# Patient Record
Sex: MALE | Race: BLACK OR AFRICAN AMERICAN | Employment: FULL TIME | URBAN - METROPOLITAN AREA
[De-identification: names, ages, dates, MRNs, and addresses within clinical notes are randomized per-mention and may not be internally consistent; named-entity substitution may affect disease eponyms.]

---

## 2017-01-27 ENCOUNTER — OFFICE VISIT (OUTPATIENT)
Dept: INTERNAL MEDICINE CLINIC | Age: 52
End: 2017-01-27

## 2017-01-27 VITALS
SYSTOLIC BLOOD PRESSURE: 154 MMHG | HEIGHT: 69 IN | HEART RATE: 62 BPM | WEIGHT: 206 LBS | RESPIRATION RATE: 12 BRPM | BODY MASS INDEX: 30.51 KG/M2 | TEMPERATURE: 98.6 F | DIASTOLIC BLOOD PRESSURE: 96 MMHG

## 2017-01-27 DIAGNOSIS — E78.2 MIXED HYPERLIPIDEMIA: ICD-10-CM

## 2017-01-27 DIAGNOSIS — R03.0 BORDERLINE HYPERTENSION: ICD-10-CM

## 2017-01-27 DIAGNOSIS — M75.102 ROTATOR CUFF SYNDROME, LEFT: Primary | ICD-10-CM

## 2017-01-27 DIAGNOSIS — F41.9 ANXIETY: ICD-10-CM

## 2017-01-27 DIAGNOSIS — E55.9 VITAMIN D DEFICIENCY: ICD-10-CM

## 2017-01-27 DIAGNOSIS — E29.1 MALE HYPOGONADISM: ICD-10-CM

## 2017-01-27 RX ORDER — HYDROCHLOROTHIAZIDE 25 MG/1
25 TABLET ORAL DAILY
Qty: 90 TAB | Refills: 1 | Status: SHIPPED | OUTPATIENT
Start: 2017-01-27 | End: 2017-07-28 | Stop reason: SDUPTHER

## 2017-01-27 RX ORDER — EMTRICITABINE AND TENOFOVIR DISOPROXIL FUMARATE 200; 300 MG/1; MG/1
1 TABLET, FILM COATED ORAL DAILY
Refills: 0 | COMMUNITY
Start: 2016-12-28 | End: 2022-06-09 | Stop reason: ALTCHOICE

## 2017-01-27 RX ORDER — CLINDAMYCIN PHOSPHATE 11.9 MG/ML
SOLUTION TOPICAL
Qty: 60 ML | Refills: 3 | Status: SHIPPED | OUTPATIENT
Start: 2017-01-27 | End: 2017-07-28 | Stop reason: SDUPTHER

## 2017-01-27 RX ORDER — PRAVASTATIN SODIUM 20 MG/1
20 TABLET ORAL
Qty: 90 TAB | Refills: 1 | Status: SHIPPED | OUTPATIENT
Start: 2017-01-27 | End: 2017-07-28 | Stop reason: SDUPTHER

## 2017-01-27 RX ORDER — ALPRAZOLAM 0.25 MG/1
0.25 TABLET ORAL
Qty: 90 TAB | Refills: 1 | Status: SHIPPED | OUTPATIENT
Start: 2017-01-27 | End: 2017-07-31 | Stop reason: SDUPTHER

## 2017-01-27 RX ORDER — TESTOSTERONE CYPIONATE 200 MG/ML
200 INJECTION INTRAMUSCULAR
Qty: 10 ML | Refills: 1 | Status: SHIPPED | OUTPATIENT
Start: 2017-01-27 | End: 2017-07-28 | Stop reason: SDUPTHER

## 2017-01-27 RX ORDER — HYDROCODONE BITARTRATE AND ACETAMINOPHEN 5; 325 MG/1; MG/1
1 TABLET ORAL
Qty: 30 TAB | Refills: 0 | Status: SHIPPED | OUTPATIENT
Start: 2017-01-27 | End: 2017-07-28 | Stop reason: ALTCHOICE

## 2017-01-27 RX ORDER — HYDROCHLOROTHIAZIDE 12.5 MG/1
12.5 CAPSULE ORAL DAILY
Qty: 90 CAP | Refills: 1 | Status: CANCELLED | OUTPATIENT
Start: 2017-01-27

## 2017-01-27 NOTE — MR AVS SNAPSHOT
Visit Information Date & Time Provider Department Dept. Phone Encounter #  
 1/27/2017 10:30 AM Asael Mclaughlin MD [facility] [address] [id number] Upcoming Health Maintenance Date Due DTaP/Tdap/Td series (1 - Tdap) 6/15/1985 INFLUENZA AGE 9 TO ADULT 8/1/2016 COLONOSCOPY 11/7/2018 Allergies as of 1/27/2017  Review Complete On: 1/27/2017 By: Asael Mclaughlin MD  
  
 Severity Noted Reaction Type Reactions Sulfa (Sulfonamide Antibiotics)  03/06/2015    Other (comments) Current Immunizations  Never Reviewed No immunizations on file. Not reviewed this visit You Were Diagnosed With   
  
 Codes Comments Rotator cuff syndrome, left    -  Primary ICD-10-CM: M75.102 ICD-9-CM: 726.10 Anxiety     ICD-10-CM: F41.9 ICD-9-CM: 300.00 Mixed hyperlipidemia     ICD-10-CM: E78.2 ICD-9-CM: 272.2 Borderline hypertension     ICD-10-CM: R03.0 ICD-9-CM: 796.2 Male hypogonadism     ICD-10-CM: E29.1 ICD-9-CM: 257.2 Vitamin D deficiency     ICD-10-CM: E55.9 ICD-9-CM: 268.9 Vitals BP Pulse Temp Resp Height(growth percentile) Weight(growth percentile) (!) 154/96 (BP 1 Location: Left arm, BP Patient Position: Sitting) 62 98.6 °F (37 °C) 12 5' 9\" (1.753 m) 206 lb (93.4 kg) BMI Smoking Status 30.42 kg/m2 Former Smoker Vitals History BMI and BSA Data Body Mass Index Body Surface Area  
 30.42 kg/m 2 2.13 m 2 Preferred Pharmacy Pharmacy Name Phone [phone] Your Updated Medication List  
  
   
This list is accurate as of: 1/27/17 11:20 AM.  Always use your most recent med list.  
  
  
  
  
 ALPRAZolam 0.25 mg tablet Commonly known as:  Karthikeyan Martinez Take 1 Tab by mouth daily as needed for Anxiety or Sleep. Indications: ANXIETY  
  
 betamethasone dipropionate 0.05 % ointment Commonly known as:  Shirin Asai Apply  to affected area two (2) times a day. clindamycin 1 % external solution Commonly known as:  CLEOCIN T  
use thin film on affected area  
  
 desoximetasone 0.05 % topical cream  
Commonly known as:  TOPICORT Apply  to affected area two (2) times a day. hydroCHLOROthiazide 25 mg tablet Commonly known as:  HYDRODIURIL Take 1 Tab by mouth daily. HYDROcodone-acetaminophen 5-325 mg per tablet Commonly known as:  Lynnetta Whitney Take 1 Tab by mouth every six (6) hours as needed for Pain. Max Daily Amount: 4 Tabs. pantoprazole 40 mg tablet Commonly known as:  PROTONIX Take 1 Tab by mouth daily. pravastatin 20 mg tablet Commonly known as:  PRAVACHOL Take 1 Tab by mouth nightly. testosterone cypionate 200 mg/mL injection Commonly known as:  DEPOTESTOTERONE CYPIONATE 1 mL by IntraMUSCular route every fourteen (14) days. Max Daily Amount: 200 mg. Please dispense 10 ml bottle TRUVADA 200-300 mg per tablet Generic drug:  emtricitabine-tenofovir (TDF) Take 1 Tab by mouth daily. Prescriptions Printed Refills ALPRAZolam (XANAX) 0.25 mg tablet 1 Sig: Take 1 Tab by mouth daily as needed for Anxiety or Sleep. Indications: ANXIETY Class: Print Route: Oral  
 clindamycin (CLEOCIN T) 1 % external solution 3 Sig: use thin film on affected area Class: Print  
 pravastatin (PRAVACHOL) 20 mg tablet 1 Sig: Take 1 Tab by mouth nightly. Class: Print Route: Oral  
 testosterone cypionate (DEPOTESTOTERONE CYPIONATE) 200 mg/mL injection 1 Si mL by IntraMUSCular route every fourteen (14) days. Max Daily Amount: 200 mg. Please dispense 10 ml bottle Class: Print Route: IntraMUSCular  
 hydroCHLOROthiazide (HYDRODIURIL) 25 mg tablet 1 Sig: Take 1 Tab by mouth daily. Class: Print  Route: Oral  
 HYDROcodone-acetaminophen (NORCO) 5-325 mg per tablet 0  
 Sig: Take 1 Tab by mouth every six (6) hours as needed for Pain. Max Daily Amount: 4 Tabs. Class: Print Route: Oral  
  
We Performed the Following CBC WITH AUTOMATED DIFF [60108 CPT(R)] METABOLIC PANEL, COMPREHENSIVE [38800 CPT(R)] PSA DIAGNOSTIC (PROSTATIC SPECIFIC AG) U115954 CPT(R)] REFERRAL TO ORTHOPEDICS [WPD612 Custom] Comments:  
 Please evaluate patient for left shoulder pain. VITAMIN D, 25 HYDROXY [id number] CPT(R)] To-Do List   
 01/27/2017 Imaging:  MRI SHOULDER LT WO CONT Referral Information Referral ID Referred By Referred To  
  
 [id number] Nalini Figueroa Not Available Visits Status Start Date End Date 1 New Request 1/27/17 1/27/18 If your referral has a status of pending review or denied, additional information will be sent to support the outcome of this decision. Referral ID Referred By Referred To  
 [id number] [address] Phone: [phone] Fax: [phone] Visits Status Start Date End Date 1 New Request 1/27/17 1/27/18 If your referral has a status of pending review or denied, additional information will be sent to support the outcome of this decision. Introducing [facility] & HEALTH SERVICES! Dear Eric Tran: Thank you for requesting a [email or web address] account. Our records indicate that you already have an active [email or web address] account. You can access your account anytime at [email or web address] Did you know that you can access your hospital and ER discharge instructions at any time in [email or web address]? You can also review all of your test results from your hospital stay or ER visit. Additional Information If you have questions, please visit the Frequently Asked Questions section of the [email or web address] website at [email or web address] Remember, [email or web address] is NOT to be used for urgent needs. For medical emergencies, dial 911. Now available from your iPhone and Android! Please provide this summary of care documentation to your next provider. Your primary care clinician is listed as Glenda Jamison. If you have any questions after today's visit, please call [phone].

## 2017-01-27 NOTE — PROGRESS NOTES
HISTORY OF PRESENT ILLNESS    Presents with left shoulder pain for 8 month(s). Worsening since 11/2016. Felt a dull pain. Associated symptoms include: hurts to lift over head. Feels pain of lateral  elbow and arm shakes when trying to lift it. No prior issues w shoulder. Low T. Saw Dr Sepideh Monae 1/19/17 and needs labs done by me - PSA, Test, CMP, CBC    Seeing [facility] for high risk sexual behavior and is taking HIV prophylaxis. Cr was 1. 12. Taking Truvada. Hypertension- taking 12.5 mg HCTZ  Hypertension ROS: taking medications as instructed, no medication side effects noted, no TIA's, no chest pain on exertion, no dyspnea on exertion, no swelling of ankles     reports that he has quit smoking. He quit smokeless tobacco use about 10 years ago. reports that he drinks alcohol. BP Readings from Last 2 Encounters:   01/27/17 (!) 154/96   09/16/16 (!) 161/93     Hyperlipidemia  Currently he takes pravastatin 20 mg  ROS: taking medications as instructed, no medication side effects noted  No new myalgias, no joint pains, no weakness  No TIA's, no chest pain on exertion, no dyspnea on exertion, no swelling of ankles. Lab Results   Component Value Date/Time    Cholesterol, total 164 05/06/2016 10:43 AM    HDL Cholesterol 40 05/06/2016 10:43 AM    LDL, calculated 101 05/06/2016 10:43 AM    VLDL, calculated 23 05/06/2016 10:43 AM    Triglyceride 116 05/06/2016 10:43 AM         Review of Systems   All other systems reviewed and are negative, except as noted in HPI    Past Medical and Surgical History   has a past medical history of Anxiety; Borderline hypertension; Cystic acne; Diverticulitis of colon (7/2014); Eczema; GERD (gastroesophageal reflux disease); Hyperlipidemia; Male hypogonadism (2012); Skin abscess; Tubular adenoma (11/2013); Vitamin B12 deficiency; and Vitamin D deficiency. has a past surgical history that includes cholecystectomy (2005) and colonoscopy (11/7/13).      reports that he has quit smoking. He quit smokeless tobacco use about 10 years ago. He reports that he drinks alcohol.  family history includes Cancer in his mother; Hypertension in his mother and sister. Physical Exam   Nursing note and vitals reviewed. Blood pressure (!) 154/96, pulse 62, temperature 98.6 °F (37 °C), resp. rate 12, height 5' 9\" (1.753 m), weight 206 lb (93.4 kg). Constitutional: In no distress. Eyes: Conjunctivae are normal.  HEENT:  No LAD or thyromegaly   Cardiovascular: Normal rate. regular rhythm. No murmurs  No edema  Pulmonary/Chest: Effort normal. clear to ausculation blaterally  Musculoskeletal:  no edema. Abd:  Neurological: Alert and oriented. Grossly intact cranial nerves and motor function. Skin: No rash noted. Psychiatric: Normal mood and affect. Behavior is normal.     ASSESSMENT and PLAN  Junito Emerson was seen today for shoulder pain. Diagnoses and all orders for this visit:    Rotator cuff syndrome, left - severe pain, failed conservative tx. .   Needs MRI and ortho evaluation. norco prn.    -     MRI SHOULDER LT WO CONT; Future  -     REFERRAL TO ORTHOPEDICS  -     HYDROcodone-acetaminophen (NORCO) 5-325 mg per tablet; Take 1 Tab by mouth every six (6) hours as needed for Pain. Max Daily Amount: 4 Tabs. Anxiety - Controlled on current regimen. Continue current medications as written in chart.  -     ALPRAZolam (XANAX) 0.25 mg tablet; Take 1 Tab by mouth daily as needed for Anxiety or Sleep. Indications: ANXIETY    Mixed hyperlipidemia - Controlled on current regimen. Continue current medications as written in chart. -     pravastatin (PRAVACHOL) 20 mg tablet; Take 1 Tab by mouth nightly. Borderline hypertension - borderline controlled. Increase HCTZ to 25 mg daily    -     CBC WITH AUTOMATED DIFF  -     hydroCHLOROthiazide (HYDRODIURIL) 25 mg tablet; Take 1 Tab by mouth daily. Male hypogonadism - Controlled on current regimen.   Continue current medications as written in chart. -     testosterone cypionate (DEPOTESTOTERONE CYPIONATE) 200 mg/mL injection; 1 mL by IntraMUSCular route every fourteen (14) days. Max Daily Amount: 200 mg. Please dispense 10 ml bottle  -     PROSTATE SPECIFIC AG  -     METABOLIC PANEL, COMPREHENSIVE    Vitamin D deficiency  -     VITAMIN D, 25 HYDROXY      lab results and schedule of future lab studies reviewed with patient  reviewed medications and side effects in detail    Return to clinic for further evaluation if new symptoms develop or if current symptoms worsen or fail to resolve. There are no Patient Instructions on file for this visit.

## 2017-01-28 LAB
25(OH)D3+25(OH)D2 SERPL-MCNC: 16 NG/ML (ref 30–100)
ALBUMIN SERPL-MCNC: 4.8 G/DL (ref 3.5–5.5)
ALBUMIN/GLOB SERPL: 1.8 {RATIO} (ref 1.1–2.5)
ALP SERPL-CCNC: 70 IU/L (ref 39–117)
ALT SERPL-CCNC: 21 IU/L (ref 0–44)
AST SERPL-CCNC: 27 IU/L (ref 0–40)
BASOPHILS # BLD AUTO: 0 X10E3/UL (ref 0–0.2)
BASOPHILS NFR BLD AUTO: 0 %
BILIRUB SERPL-MCNC: 1.3 MG/DL (ref 0–1.2)
BUN SERPL-MCNC: 13 MG/DL (ref 6–24)
BUN/CREAT SERPL: 10 (ref 9–20)
CALCIUM SERPL-MCNC: 9.4 MG/DL (ref 8.7–10.2)
CHLORIDE SERPL-SCNC: 98 MMOL/L (ref 96–106)
CO2 SERPL-SCNC: 27 MMOL/L (ref 18–29)
CREAT SERPL-MCNC: 1.31 MG/DL (ref 0.76–1.27)
EOSINOPHIL # BLD AUTO: 0 X10E3/UL (ref 0–0.4)
EOSINOPHIL NFR BLD AUTO: 0 %
ERYTHROCYTE [DISTWIDTH] IN BLOOD BY AUTOMATED COUNT: 15.7 % (ref 12.3–15.4)
GLOBULIN SER CALC-MCNC: 2.6 G/DL (ref 1.5–4.5)
GLUCOSE SERPL-MCNC: 73 MG/DL (ref 65–99)
HCT VFR BLD AUTO: 49.2 % (ref 37.5–51)
HGB BLD-MCNC: 16.9 G/DL (ref 12.6–17.7)
IMM GRANULOCYTES # BLD: 0 X10E3/UL (ref 0–0.1)
IMM GRANULOCYTES NFR BLD: 0 %
LYMPHOCYTES # BLD AUTO: 1.8 X10E3/UL (ref 0.7–3.1)
LYMPHOCYTES NFR BLD AUTO: 27 %
MCH RBC QN AUTO: 34.1 PG (ref 26.6–33)
MCHC RBC AUTO-ENTMCNC: 34.3 G/DL (ref 31.5–35.7)
MCV RBC AUTO: 99 FL (ref 79–97)
MONOCYTES # BLD AUTO: 0.4 X10E3/UL (ref 0.1–0.9)
MONOCYTES NFR BLD AUTO: 5 %
NEUTROPHILS # BLD AUTO: 4.4 X10E3/UL (ref 1.4–7)
NEUTROPHILS NFR BLD AUTO: 68 %
PLATELET # BLD AUTO: 159 X10E3/UL (ref 150–379)
POTASSIUM SERPL-SCNC: 4.1 MMOL/L (ref 3.5–5.2)
PROT SERPL-MCNC: 7.4 G/DL (ref 6–8.5)
PSA SERPL-MCNC: 1.4 NG/ML (ref 0–4)
RBC # BLD AUTO: 4.96 X10E6/UL (ref 4.14–5.8)
SODIUM SERPL-SCNC: 143 MMOL/L (ref 134–144)
WBC # BLD AUTO: 6.6 X10E3/UL (ref 3.4–10.8)

## 2017-01-31 RX ORDER — ASPIRIN 325 MG
1 TABLET, DELAYED RELEASE (ENTERIC COATED) ORAL
Qty: 8 CAP | Refills: 0 | Status: SHIPPED | OUTPATIENT
Start: 2017-01-31 | End: 2017-03-22

## 2017-03-27 ENCOUNTER — TELEPHONE (OUTPATIENT)
Dept: INTERNAL MEDICINE CLINIC | Age: 52
End: 2017-03-27

## 2017-03-27 RX ORDER — OSELTAMIVIR PHOSPHATE 75 MG/1
75 CAPSULE ORAL 2 TIMES DAILY
Qty: 10 CAP | Refills: 0 | Status: SHIPPED | OUTPATIENT
Start: 2017-03-27 | End: 2017-04-01

## 2017-03-27 NOTE — TELEPHONE ENCOUNTER
Patient would like a call back to discuss some symptoms he is having. He lives 2 hours a way and there are no appts.   [phone]

## 2017-03-27 NOTE — TELEPHONE ENCOUNTER
Spoke with pt: requesting Tamiflu he was exposed to Flu but his temp went to 99.9, some aching , is taking Nyquil and Robitussin , cough has subsided, no appointment's available .

## 2017-03-27 NOTE — TELEPHONE ENCOUNTER
Prescription sent to pharmacy, per request  Return to clinic for further evaluation if new symptoms develop or if current symptoms worsen or fail to resolve.

## 2017-05-01 ENCOUNTER — PATIENT MESSAGE (OUTPATIENT)
Dept: INTERNAL MEDICINE CLINIC | Age: 52
End: 2017-05-01

## 2017-05-01 DIAGNOSIS — K21.00 GASTROESOPHAGEAL REFLUX DISEASE WITH ESOPHAGITIS: ICD-10-CM

## 2017-05-01 RX ORDER — PANTOPRAZOLE SODIUM 40 MG/1
40 TABLET, DELAYED RELEASE ORAL DAILY
Qty: 90 TAB | Refills: 1 | Status: SHIPPED | OUTPATIENT
Start: 2017-05-01 | End: 2017-07-28 | Stop reason: SDUPTHER

## 2017-07-28 ENCOUNTER — OFFICE VISIT (OUTPATIENT)
Dept: INTERNAL MEDICINE CLINIC | Age: 52
End: 2017-07-28

## 2017-07-28 VITALS
HEIGHT: 69 IN | BODY MASS INDEX: 29.18 KG/M2 | TEMPERATURE: 98.5 F | HEART RATE: 69 BPM | RESPIRATION RATE: 12 BRPM | DIASTOLIC BLOOD PRESSURE: 82 MMHG | WEIGHT: 197 LBS | SYSTOLIC BLOOD PRESSURE: 133 MMHG

## 2017-07-28 DIAGNOSIS — L70.0 CYSTIC ACNE: ICD-10-CM

## 2017-07-28 DIAGNOSIS — K21.00 GASTROESOPHAGEAL REFLUX DISEASE WITH ESOPHAGITIS: ICD-10-CM

## 2017-07-28 DIAGNOSIS — N52.9 ERECTILE DYSFUNCTION, UNSPECIFIED ERECTILE DYSFUNCTION TYPE: ICD-10-CM

## 2017-07-28 DIAGNOSIS — E29.1 MALE HYPOGONADISM: ICD-10-CM

## 2017-07-28 DIAGNOSIS — E87.6 HYPOKALEMIA: Primary | ICD-10-CM

## 2017-07-28 DIAGNOSIS — E78.00 PURE HYPERCHOLESTEROLEMIA: ICD-10-CM

## 2017-07-28 DIAGNOSIS — R03.0 BORDERLINE HYPERTENSION: ICD-10-CM

## 2017-07-28 DIAGNOSIS — E78.2 MIXED HYPERLIPIDEMIA: ICD-10-CM

## 2017-07-28 RX ORDER — CEPHALEXIN 500 MG/1
500 CAPSULE ORAL 2 TIMES DAILY
Qty: 20 CAP | Refills: 2 | Status: SHIPPED | OUTPATIENT
Start: 2017-07-28 | End: 2017-07-28 | Stop reason: CLARIF

## 2017-07-28 RX ORDER — SPIRONOLACTONE 50 MG/1
50 TABLET, FILM COATED ORAL DAILY
Qty: 30 TAB | Refills: 2 | Status: SHIPPED | OUTPATIENT
Start: 2017-07-28 | End: 2017-09-10 | Stop reason: SDUPTHER

## 2017-07-28 RX ORDER — SILDENAFIL CITRATE 20 MG/1
TABLET ORAL
Qty: 90 TAB | Refills: 2 | Status: SHIPPED | OUTPATIENT
Start: 2017-07-28 | End: 2018-01-15

## 2017-07-28 RX ORDER — PRAVASTATIN SODIUM 20 MG/1
20 TABLET ORAL
Qty: 90 TAB | Refills: 1 | Status: SHIPPED | OUTPATIENT
Start: 2017-07-28 | End: 2018-05-25 | Stop reason: SDUPTHER

## 2017-07-28 RX ORDER — TESTOSTERONE CYPIONATE 200 MG/ML
200 INJECTION INTRAMUSCULAR
Qty: 10 ML | Refills: 1 | Status: SHIPPED | OUTPATIENT
Start: 2017-07-28 | End: 2018-05-25 | Stop reason: SDUPTHER

## 2017-07-28 RX ORDER — POTASSIUM CHLORIDE 20 MEQ/1
20 TABLET, EXTENDED RELEASE ORAL DAILY
Qty: 30 TAB | Refills: 2 | Status: SHIPPED | OUTPATIENT
Start: 2017-07-28 | End: 2017-09-08 | Stop reason: SDDI

## 2017-07-28 RX ORDER — PANTOPRAZOLE SODIUM 40 MG/1
40 TABLET, DELAYED RELEASE ORAL DAILY
Qty: 90 TAB | Refills: 1 | Status: SHIPPED | OUTPATIENT
Start: 2017-07-28 | End: 2017-12-19 | Stop reason: SDUPTHER

## 2017-07-28 RX ORDER — HYDROCHLOROTHIAZIDE 25 MG/1
25 TABLET ORAL DAILY
Qty: 90 TAB | Refills: 1 | Status: SHIPPED | OUTPATIENT
Start: 2017-07-28 | End: 2018-03-01 | Stop reason: SDUPTHER

## 2017-07-28 RX ORDER — CLINDAMYCIN PHOSPHATE 11.9 MG/ML
SOLUTION TOPICAL
Qty: 60 ML | Refills: 3 | Status: SHIPPED | OUTPATIENT
Start: 2017-07-28 | End: 2018-12-14 | Stop reason: SDUPTHER

## 2017-07-28 NOTE — MR AVS SNAPSHOT
Visit Information Date & Time Provider Department Dept. Phone Encounter #  
 7/28/2017  2:15 PM Eduard Garcia MD [facility] [address] [id number] Upcoming Health Maintenance Date Due DTaP/Tdap/Td series (1 - Tdap) 6/15/1985 INFLUENZA AGE 9 TO ADULT 8/1/2017 COLONOSCOPY 11/7/2018 Allergies as of 7/28/2017  Review Complete On: 7/28/2017 By: Familia Yao Severity Noted Reaction Type Reactions Sulfa (Sulfonamide Antibiotics)  03/06/2015    Other (comments) Current Immunizations  Never Reviewed No immunizations on file. Not reviewed this visit You Were Diagnosed With   
  
 Codes Comments Hypokalemia    -  Primary ICD-10-CM: E87.6 ICD-9-CM: 276.8 Borderline hypertension     ICD-10-CM: R03.0 ICD-9-CM: 796.2 Pure hypercholesterolemia     ICD-10-CM: E78.00 ICD-9-CM: 272.0 Cystic acne     ICD-10-CM: L70.0 ICD-9-CM: 706.1 Vitals BP Pulse Temp Resp Height(growth percentile) Weight(growth percentile) 133/82 (BP 1 Location: Left arm, BP Patient Position: Sitting) 69 98.5 °F (36.9 °C) (Oral) 12 5' 9\" (1.753 m) 197 lb (89.4 kg) BMI Smoking Status 29.09 kg/m2 Former Smoker Vitals History BMI and BSA Data Body Mass Index Body Surface Area  
 29.09 kg/m 2 2.09 m 2 Preferred Pharmacy Pharmacy Name Phone [phone] Your Updated Medication List  
  
   
This list is accurate as of: 7/28/17  2:50 PM.  Always use your most recent med list.  
  
  
  
  
 ALPRAZolam 0.25 mg tablet Commonly known as:  Deepak Rodgers Take 1 Tab by mouth daily as needed for Anxiety or Sleep. Indications: ANXIETY  
  
 betamethasone dipropionate 0.05 % ointment Commonly known as:  Birtha Risk Apply  to affected area two (2) times a day. clindamycin 1 % external solution Commonly known as:  CLEOCIN T  
 use thin film on affected area  
  
 desoximetasone 0.05 % topical cream  
Commonly known as:  TOPICORT Apply  to affected area two (2) times a day. hydroCHLOROthiazide 25 mg tablet Commonly known as:  HYDRODIURIL Take 1 Tab by mouth daily. HYDROcodone-acetaminophen 5-325 mg per tablet Commonly known as:  Liebenthal Jef Take 1 Tab by mouth every six (6) hours as needed for Pain. Max Daily Amount: 4 Tabs. pantoprazole 40 mg tablet Commonly known as:  PROTONIX Take 1 Tab by mouth daily. potassium chloride 20 mEq tablet Commonly known as:  K-DUR, KLOR-CON Take 1 Tab by mouth daily. pravastatin 20 mg tablet Commonly known as:  PRAVACHOL Take 1 Tab by mouth nightly. spironolactone 50 mg tablet Commonly known as:  ALDACTONE Take 1 Tab by mouth daily. DO NOT TAKE WITH POTASSIUM  
  
 testosterone cypionate 200 mg/mL injection Commonly known as:  DEPOTESTOTERONE CYPIONATE 1 mL by IntraMUSCular route every fourteen (14) days. Max Daily Amount: 200 mg. Please dispense 10 ml bottle TRUVADA 200-300 mg per tablet Generic drug:  emtricitabine-tenofovir (TDF) Take 1 Tab by mouth daily. Prescriptions Printed Refills  
 spironolactone (ALDACTONE) 50 mg tablet 2 Sig: Take 1 Tab by mouth daily. DO NOT TAKE WITH POTASSIUM Class: Print Route: Oral  
  
Prescriptions Sent to Pharmacy Refills  
 potassium chloride (K-DUR, KLOR-CON) 20 mEq tablet 2 Sig: Take 1 Tab by mouth daily. Class: Normal  
 Pharmacy: [address] [place], [place] [address] [facility] Ph #: [phone] Route: Oral  
  
We Performed the Following METABOLIC PANEL, BASIC [56426 CPT(R)] Introducing [facility] & HEALTH SERVICES! Dear Sachin Acuna: Thank you for requesting a [employer]stor account. Our records indicate that you already have an active Mangstor account. You can access your account anytime at [email or web address] Did you know that you can access your hospital and ER discharge instructions at any time in Storyvine? You can also review all of your test results from your hospital stay or ER visit. Additional Information If you have questions, please visit the Frequently Asked Questions section of the Storyvine website at [email or web address] Remember, Storyvine is NOT to be used for urgent needs. For medical emergencies, dial 911. Now available from your iPhone and Android! Please provide this summary of care documentation to your next provider. Your primary care clinician is listed as Bert Garcia. If you have any questions after today's visit, please call [phone].

## 2017-07-28 NOTE — PROGRESS NOTES
HISTORY OF PRESENT ILLNESS    Chief Complaint   Patient presents with    Hypertension       Presents for follow-up    Seeing ID at [facility] for PREP. Recent labs 7/2/17  all normal except K 2.8. Was asked to talk about this with PCP. Taking hctz  Wt Readings from Last 3 Encounters:   07/28/17 197 lb (89.4 kg)   01/27/17 206 lb (93.4 kg)   09/16/16 207 lb (93.9 kg)     Left shoulder pain is improved. Right shoulder is hurting some. He does not want to take pills for this. Refuses orthopedic f/u \"boycotting them\". Ongoing acne cystic of scalp, under buttock, axilla. Using clindamycin gel which helps some. He also used antibiotic pills, but this caused constipation    Hypertension  Hypertension ROS: taking medications as instructed, no medication side effects noted, no TIA's, no chest pain on exertion, no dyspnea on exertion, no swelling of ankles     reports that he has quit smoking. He quit smokeless tobacco use about 10 years ago. reports that he drinks alcohol. BP Readings from Last 2 Encounters:   07/28/17 133/82   01/27/17 (!) 154/96       Review of Systems   All other systems reviewed and are negative, except as noted in HPI    Past Medical and Surgical History   has a past medical history of Anxiety; Borderline hypertension; Cystic acne; Diverticulitis of colon (7/2014); Eczema; GERD (gastroesophageal reflux disease); Hyperlipidemia; Male hypogonadism (2012); Skin abscess; Tubular adenoma (11/2013); Vitamin B12 deficiency; and Vitamin D deficiency. has a past surgical history that includes cholecystectomy (2005) and colonoscopy (11/7/13). reports that he has quit smoking. He quit smokeless tobacco use about 10 years ago. He reports that he drinks alcohol.  family history includes Cancer in his mother; Hypertension in his mother and sister. Physical Exam   Nursing note and vitals reviewed. Blood pressure 133/82, pulse 69, temperature 98.5 °F (36.9 °C), temperature source Oral, resp. rate 12, height 5' 9\" (1.753 m), weight 197 lb (89.4 kg). Constitutional:  No distress. Eyes: Conjunctivae are normal.   Ears:  Hearing grossly intact  Cardiovascular: Normal rate. regular rhythm, no murmurs or gallops  No edema  Pulmonary/Chest: Effort normal.   CTAB  Musculoskeletal: moves all 4 extremities   Neurological: Alert and oriented to person, place, and time. Skin: No rash noted. Psychiatric: Normal mood and affect. Behavior is normal.     ASSESSMENT and PLAN  Diagnoses and all orders for this visit:    1. Hypokalemia - due to HCTZ. I recommend trial of EITHER KCL or can add spironolactone for this and for acne. rtc 6 weeks for labs  -     potassium chloride (K-DUR, KLOR-CON) 20 mEq tablet; Take 1 Tab by mouth daily. -     spironolactone (ALDACTONE) 50 mg tablet; Take 1 Tab by mouth daily. DO NOT TAKE WITH POTASSIUM  -     METABOLIC PANEL, BASIC    2. Borderline hypertension - Controlled on current regimen. Continue current medications as written in chart.  -     METABOLIC PANEL, BASIC  -     hydroCHLOROthiazide (HYDRODIURIL) 25 mg tablet; Take 1 Tab by mouth daily. 3. Pure hypercholesterolemia - constipation    4. Cystic acne - scalp. Trial of spironolactone    5. Erectile dysfunction, unspecified erectile dysfunction type  -     sildenafil (REVATIO) 20 mg tablet; Take 3-5 pills once daily as needed    6. Mixed hyperlipidemia - Controlled on current regimen. Continue current medications as written in chart. -     pravastatin (PRAVACHOL) 20 mg tablet; Take 1 Tab by mouth nightly. 7. Male hypogonadism - controlled. Check labs in 6 weeks. -     testosterone cypionate (DEPOTESTOTERONE CYPIONATE) 200 mg/mL injection; 1 mL by IntraMUSCular route every fourteen (14) days. Max Daily Amount: 200 mg. Please dispense 10 ml bottle    8. Gastroesophageal reflux disease with esophagitis  -     pantoprazole (PROTONIX) 40 mg tablet; Take 1 Tab by mouth daily.     Other orders  - clindamycin (CLEOCIN T) 1 % external solution; use thin film on affected area      There are no Patient Instructions on file for this visit.    lab results and schedule of future lab studies reviewed with patient  reviewed medications and side effects in detail    Return to clinic for further evaluation if new symptoms develop    Follow-up Disposition: Not on File    Current Outpatient Prescriptions   Medication Sig    potassium chloride (K-DUR, KLOR-CON) 20 mEq tablet Take 1 Tab by mouth daily.  spironolactone (ALDACTONE) 50 mg tablet Take 1 Tab by mouth daily. DO NOT TAKE WITH POTASSIUM    sildenafil (REVATIO) 20 mg tablet Take 3-5 pills once daily as needed    pravastatin (PRAVACHOL) 20 mg tablet Take 1 Tab by mouth nightly.  testosterone cypionate (DEPOTESTOTERONE CYPIONATE) 200 mg/mL injection 1 mL by IntraMUSCular route every fourteen (14) days. Max Daily Amount: 200 mg. Please dispense 10 ml bottle    hydroCHLOROthiazide (HYDRODIURIL) 25 mg tablet Take 1 Tab by mouth daily.  pantoprazole (PROTONIX) 40 mg tablet Take 1 Tab by mouth daily.  clindamycin (CLEOCIN T) 1 % external solution use thin film on affected area    ALPRAZolam (XANAX) 0.25 mg tablet Take 1 Tab by mouth daily as needed for Anxiety or Sleep. Indications: ANXIETY    TRUVADA 200-300 mg per tablet Take 1 Tab by mouth daily.  desoximetasone (TOPICORT) 0.05 % topical cream Apply  to affected area two (2) times a day.  betamethasone dipropionate (DIPROLENE) 0.05 % ointment Apply  to affected area two (2) times a day. No current facility-administered medications for this visit.

## 2017-07-31 DIAGNOSIS — F41.9 ANXIETY: ICD-10-CM

## 2017-07-31 RX ORDER — ALPRAZOLAM 0.25 MG/1
0.25 TABLET ORAL
Qty: 90 TAB | Refills: 1 | OUTPATIENT
Start: 2017-07-31 | End: 2018-05-25 | Stop reason: SDUPTHER

## 2017-08-08 RX ORDER — DOXYCYCLINE HYCLATE 100 MG
100 TABLET ORAL 2 TIMES DAILY
Qty: 20 TAB | Refills: 0 | Status: SHIPPED | OUTPATIENT
Start: 2017-08-08 | End: 2017-08-18

## 2017-09-08 ENCOUNTER — OFFICE VISIT (OUTPATIENT)
Dept: INTERNAL MEDICINE CLINIC | Age: 52
End: 2017-09-08

## 2017-09-08 VITALS
DIASTOLIC BLOOD PRESSURE: 94 MMHG | HEIGHT: 69 IN | SYSTOLIC BLOOD PRESSURE: 147 MMHG | TEMPERATURE: 98.8 F | BODY MASS INDEX: 28.85 KG/M2 | RESPIRATION RATE: 12 BRPM | WEIGHT: 194.8 LBS | HEART RATE: 89 BPM

## 2017-09-08 DIAGNOSIS — I10 BENIGN ESSENTIAL HTN: ICD-10-CM

## 2017-09-08 DIAGNOSIS — L70.0 CYSTIC ACNE: ICD-10-CM

## 2017-09-08 DIAGNOSIS — L72.3 SEBACEOUS CYST: Primary | ICD-10-CM

## 2017-09-08 DIAGNOSIS — E29.1 MALE HYPOGONADISM: ICD-10-CM

## 2017-09-08 DIAGNOSIS — E55.9 VITAMIN D DEFICIENCY: ICD-10-CM

## 2017-09-09 NOTE — PROGRESS NOTES
HISTORY OF PRESENT ILLNESS  Caio Houser is a 47 y.o. male. HPI   Cystic acne with inflamed sebaceous cysts. Started spironolactone and using cleocin gel. This is chronic. Hypertension - taking hctz and added spironolactone. Not taking KCL. Hypertension ROS: taking medications as instructed, no medication side effects noted, no TIA's, no chest pain on exertion, no dyspnea on exertion, no swelling of ankles     reports that he has quit smoking. He quit smokeless tobacco use about 10 years ago. reports that he drinks alcohol. BP Readings from Last 2 Encounters:   09/08/17 (!) 147/94   07/28/17 133/82       Review of Systems   Constitutional: Negative for diaphoresis, malaise/fatigue and weight loss. Eyes: Negative for blurred vision. Respiratory: Negative for shortness of breath. Cardiovascular: Negative for chest pain. Gastrointestinal: Negative for abdominal pain. Genitourinary: Negative for flank pain and frequency. Musculoskeletal: Negative for myalgias. Skin: Negative for rash. Neurological: Negative for dizziness, weakness and headaches. Psychiatric/Behavioral: The patient is not nervous/anxious. All other systems reviewed and are negative. Physical Exam   Constitutional: He is oriented to person, place, and time. He appears well-developed and well-nourished. No distress. HENT:   Head:       2 firm cysts of right cheek. Also left occipital nodule. Mild acne otherwise   Cardiovascular: Normal rate. Pulmonary/Chest: Effort normal.   Musculoskeletal: He exhibits no edema. Neurological: He is alert and oriented to person, place, and time. Psychiatric: He has a normal mood and affect. His behavior is normal. Judgment and thought content normal.   Nursing note and vitals reviewed. ASSESSMENT and PLAN  Diagnoses and all orders for this visit:    1. Sebaceous cysts - small incisions made and sebum expressed from both facial lesions. 2. Cystic acne - also. It is unclear if spironolactone will help this, but I recommend continuing it and will increase dose if K is acceptable. Using Testosterone which may affect it. 3. Male hypogonadism  -     PROSTATE SPECIFIC AG  -     TESTOSTERONE, FREE & TOTAL  -     CBC W/O DIFF    4. Vitamin D deficiency  -     VITAMIN D, 25 HYDROXY    5. Benign essential HTN - uncontrolled. Will try to push up spironolactone.     -     METABOLIC PANEL, BASIC

## 2017-09-10 DIAGNOSIS — E87.6 HYPOKALEMIA: ICD-10-CM

## 2017-09-10 LAB
25(OH)D3+25(OH)D2 SERPL-MCNC: 25.3 NG/ML (ref 30–100)
BUN SERPL-MCNC: 12 MG/DL (ref 6–24)
BUN/CREAT SERPL: 10 (ref 9–20)
CALCIUM SERPL-MCNC: 9.7 MG/DL (ref 8.7–10.2)
CHLORIDE SERPL-SCNC: 96 MMOL/L (ref 96–106)
CO2 SERPL-SCNC: 29 MMOL/L (ref 18–29)
CREAT SERPL-MCNC: 1.21 MG/DL (ref 0.76–1.27)
ERYTHROCYTE [DISTWIDTH] IN BLOOD BY AUTOMATED COUNT: 15.9 % (ref 12.3–15.4)
GLUCOSE SERPL-MCNC: 74 MG/DL (ref 65–99)
HCT VFR BLD AUTO: 47.8 % (ref 37.5–51)
HGB BLD-MCNC: 16.5 G/DL (ref 12.6–17.7)
MCH RBC QN AUTO: 33.6 PG (ref 26.6–33)
MCHC RBC AUTO-ENTMCNC: 34.5 G/DL (ref 31.5–35.7)
MCV RBC AUTO: 97 FL (ref 79–97)
PLATELET # BLD AUTO: 209 X10E3/UL (ref 150–379)
POTASSIUM SERPL-SCNC: 3.7 MMOL/L (ref 3.5–5.2)
PSA SERPL-MCNC: 1.1 NG/ML (ref 0–4)
RBC # BLD AUTO: 4.91 X10E6/UL (ref 4.14–5.8)
SODIUM SERPL-SCNC: 142 MMOL/L (ref 134–144)
TESTOST FREE SERPL-MCNC: 12.1 PG/ML (ref 7.2–24)
TESTOST SERPL-MCNC: 440 NG/DL (ref 264–916)
WBC # BLD AUTO: 8.5 X10E3/UL (ref 3.4–10.8)

## 2017-09-10 RX ORDER — SPIRONOLACTONE 100 MG/1
100 TABLET, FILM COATED ORAL DAILY
Qty: 30 TAB | Refills: 2 | Status: SHIPPED | OUTPATIENT
Start: 2017-09-10 | End: 2017-12-19 | Stop reason: SDUPTHER

## 2017-12-15 ENCOUNTER — OFFICE VISIT (OUTPATIENT)
Dept: INTERNAL MEDICINE CLINIC | Age: 52
End: 2017-12-15

## 2017-12-15 VITALS
HEART RATE: 85 BPM | RESPIRATION RATE: 12 BRPM | SYSTOLIC BLOOD PRESSURE: 131 MMHG | DIASTOLIC BLOOD PRESSURE: 84 MMHG | TEMPERATURE: 98.5 F | BODY MASS INDEX: 28.14 KG/M2 | HEIGHT: 69 IN | WEIGHT: 190 LBS

## 2017-12-15 DIAGNOSIS — R03.0 BORDERLINE HYPERTENSION: ICD-10-CM

## 2017-12-15 DIAGNOSIS — N60.02 CYST OF LEFT BREAST: Primary | ICD-10-CM

## 2017-12-15 DIAGNOSIS — E78.00 PURE HYPERCHOLESTEROLEMIA: ICD-10-CM

## 2017-12-15 NOTE — PROGRESS NOTES
HISTORY OF PRESENT ILLNESS    Chief Complaint   Patient presents with    Nipple Problem       Presents for follow-up    Hypertension- BP has been controlled  Hypertension ROS: taking medications as instructed, no medication side effects noted, no TIA's, no chest pain on exertion, no dyspnea on exertion, no swelling of ankles     reports that he has quit smoking. He quit smokeless tobacco use about 10 years ago. reports that he drinks alcohol. BP Readings from Last 2 Encounters:   12/15/17 131/84   09/08/17 (!) 147/94     Cysts on scalp, face are improved since starting spironolactone. However, he has a cyst on his left areoaa region which is occasionally draining some thick sebum   He removed his nipple ring. It is moderately tender. Review of Systems   All other systems reviewed and are negative, except as noted in HPI    Past Medical and Surgical History   has a past medical history of Anxiety; Borderline hypertension; Cystic acne; Diverticulitis of colon (7/2014); Eczema; GERD (gastroesophageal reflux disease); Hyperlipidemia; Male hypogonadism (2012); Skin abscess; Tubular adenoma (11/2013); Vitamin B12 deficiency; and Vitamin D deficiency. has a past surgical history that includes cholecystectomy (2005) and colonoscopy (11/7/13). reports that he has quit smoking. He quit smokeless tobacco use about 10 years ago. He reports that he drinks alcohol.  family history includes Cancer in his mother; Hypertension in his mother and sister. Physical Exam   Nursing note and vitals reviewed. Blood pressure 131/84, pulse 85, temperature 98.5 °F (36.9 °C), resp. rate 12, height 5' 9\" (1.753 m), weight 190 lb (86.2 kg). Constitutional:  No distress. Eyes: Conjunctivae are normal.   Ears:  Hearing grossly intact  Cardiovascular: Normal rate. regular rhythm, no murmurs or gallops  No edema  Chest wall - left medial breast/areola with ~2 cm nodule.    No fluctuance  Pulmonary/Chest: Effort normal. CTAB  Musculoskeletal: moves all 4 extremities   Neurological: Alert and oriented to person, place, and time. Skin: No rash noted. Psychiatric: Normal mood and affect. Behavior is normal.     ASSESSMENT and PLAN  Diagnoses and all orders for this visit:    1. Cyst of left breast - I believe this is a sebacous cyst and not gynecomastia, but he it is new since taking spironolactone. Per his request I did make a 2mm incision with a scapel and I was able to express some sebum. Will refer to surgery for excision. Is is not infected  -     [facility]    2. Borderline hypertension - improved on spironolactone. Check K  -     METABOLIC PANEL, COMPREHENSIVE    3. Pure hypercholesterolemia - The patient is asked to make an attempt to improve diet and exercise patterns to aid in medical management of this problem. -     LIPID PANEL    4. Anxiety  Controlled on current regimen. Continue current medications as written in chart.  profile was accessed online for [facility] and reviewed by me during this encounter. I did not see evidence of inappropriate or suspicious controlled substance prescription activity. lab results and schedule of future lab studies reviewed with patient  reviewed medications and side effects in detail  Return to clinic for further evaluation if new symptoms develop      Current Outpatient Prescriptions   Medication Sig    spironolactone (ALDACTONE) 100 mg tablet Take 1 Tab by mouth daily. DO NOT TAKE WITH POTASSIUM    ALPRAZolam (XANAX) 0.25 mg tablet Take 1 Tab by mouth daily as needed for Anxiety or Sleep. Indications: anxiety    sildenafil (REVATIO) 20 mg tablet Take 3-5 pills once daily as needed    pravastatin (PRAVACHOL) 20 mg tablet Take 1 Tab by mouth nightly.  testosterone cypionate (DEPOTESTOTERONE CYPIONATE) 200 mg/mL injection 1 mL by IntraMUSCular route every fourteen (14) days. Max Daily Amount: 200 mg.  Please dispense 10 ml bottle    hydroCHLOROthiazide (HYDRODIURIL) 25 mg tablet Take 1 Tab by mouth daily.  pantoprazole (PROTONIX) 40 mg tablet Take 1 Tab by mouth daily.  clindamycin (CLEOCIN T) 1 % external solution use thin film on affected area    TRUVADA 200-300 mg per tablet Take 1 Tab by mouth daily.  desoximetasone (TOPICORT) 0.05 % topical cream Apply  to affected area two (2) times a day.  betamethasone dipropionate (DIPROLENE) 0.05 % ointment Apply  to affected area two (2) times a day. No current facility-administered medications for this visit.

## 2017-12-15 NOTE — LETTER
NOTIFICATION RETURN TO WORK 
 
12/15/2017 4:41 PM 
 
Mr. Simeon WAGNER 50. 
[facility] [address] To Whom It May Concern: 
 
Simeon Castano is currently under the care of [address] [place] [address],[address]. He will return to work/school on: 12/18/17 If there are questions or concerns please have the patient contact our office.  
 
 
 
Sincerely, 
 
 
Uziel Esquivel MD

## 2017-12-15 NOTE — MR AVS SNAPSHOT
Visit Information Date & Time Provider Department Dept. Phone Encounter #  
 12/15/2017  4:00 PM Long Jonas MD [facility] [address] [id number] Upcoming Health Maintenance Date Due DTaP/Tdap/Td series (1 - Tdap) 6/15/1985 Influenza Age 5 to Adult 8/1/2017 COLONOSCOPY 11/7/2018 Allergies as of 12/15/2017  Review Complete On: 12/15/2017 By: Long Jonas MD  
  
 Severity Noted Reaction Type Reactions Sulfa (Sulfonamide Antibiotics)  03/06/2015    Other (comments) Current Immunizations  Never Reviewed No immunizations on file. Not reviewed this visit You Were Diagnosed With   
  
 Codes Comments Cyst of left breast    -  Primary ICD-10-CM: N60.02 
ICD-9-CM: 610.0 Borderline hypertension     ICD-10-CM: R03.0 ICD-9-CM: 796.2 Pure hypercholesterolemia     ICD-10-CM: E78.00 ICD-9-CM: 272.0 Vitals BP Pulse Temp Resp Height(growth percentile) Weight(growth percentile) 131/84 (BP 1 Location: Left arm, BP Patient Position: Sitting) 85 98.5 °F (36.9 °C) 12 5' 9\" (1.753 m) 190 lb (86.2 kg) BMI Smoking Status 28.06 kg/m2 Former Smoker Vitals History BMI and BSA Data Body Mass Index Body Surface Area 28.06 kg/m 2 2.05 m 2 Preferred Pharmacy Pharmacy Name Phone [facility] [phone] Your Updated Medication List  
  
   
This list is accurate as of: 12/15/17  4:39 PM.  Always use your most recent med list.  
  
  
  
  
 ALPRAZolam 0.25 mg tablet Commonly known as:  Han [place] Take 1 Tab by mouth daily as needed for Anxiety or Sleep. Indications: anxiety  
  
 betamethasone dipropionate 0.05 % ointment Commonly known as:  Rowdy Rondon Apply  to affected area two (2) times a day. clindamycin 1 % external solution Commonly known as:  CLEOCIN T  
use thin film on affected area desoximetasone 0.05 % topical cream  
Commonly known as:  TOPICORT Apply  to affected area two (2) times a day. hydroCHLOROthiazide 25 mg tablet Commonly known as:  HYDRODIURIL Take 1 Tab by mouth daily. pantoprazole 40 mg tablet Commonly known as:  PROTONIX Take 1 Tab by mouth daily. pravastatin 20 mg tablet Commonly known as:  PRAVACHOL Take 1 Tab by mouth nightly. sildenafil (antihypertensive) 20 mg tablet Commonly known as:  REVATIO Take 3-5 pills once daily as needed  
  
 spironolactone 100 mg tablet Commonly known as:  ALDACTONE Take 1 Tab by mouth daily. DO NOT TAKE WITH POTASSIUM  
  
 testosterone cypionate 200 mg/mL injection Commonly known as:  DEPOTESTOTERONE CYPIONATE 1 mL by IntraMUSCular route every fourteen (14) days. Max Daily Amount: 200 mg. Please dispense 10 ml bottle TRUVADA 200-300 mg per tablet Generic drug:  emtricitabine-tenofovir (TDF) Take 1 Tab by mouth daily. We Performed the Following LIPID PANEL [45120 CPT(R)] METABOLIC PANEL, COMPREHENSIVE [03506 CPT(R)] REFERRAL TO BREAST SURGERY [REF10 Custom] Referral Information Referral ID Referred By Referred To  
  
 [id number] Vicki COOPER MD   
   [address] [place] [address] Phone: [phone] Fax: [phone] Visits Status Start Date End Date 1 New Request 12/15/17 12/15/18 If your referral has a status of pending review or denied, additional information will be sent to support the outcome of this decision. Introducing [facility] & HEALTH SERVICES! Dear Colletta Chuck: Thank you for requesting a inContact account. Our records indicate that you already have an active inContact account. You can access your account anytime at [email or web address] Did you know that you can access your hospital and ER discharge instructions at any time in [employer]? You can also review all of your test results from your hospital stay or ER visit. Additional Information If you have questions, please visit the Frequently Asked Questions section of the [employer] website at [email or web address] Remember, [employer] is NOT to be used for urgent needs. For medical emergencies, dial 911. Now available from your iPhone and Android! Please provide this summary of care documentation to your next provider. Your primary care clinician is listed as Alessia Reyes. If you have any questions after today's visit, please call [phone].

## 2017-12-19 DIAGNOSIS — K21.00 GASTROESOPHAGEAL REFLUX DISEASE WITH ESOPHAGITIS: ICD-10-CM

## 2017-12-19 DIAGNOSIS — E87.6 HYPOKALEMIA: ICD-10-CM

## 2017-12-19 RX ORDER — SPIRONOLACTONE 100 MG/1
100 TABLET, FILM COATED ORAL DAILY
Qty: 90 TAB | Refills: 1 | Status: SHIPPED | OUTPATIENT
Start: 2017-12-19 | End: 2018-05-25 | Stop reason: SDUPTHER

## 2017-12-19 RX ORDER — PANTOPRAZOLE SODIUM 40 MG/1
40 TABLET, DELAYED RELEASE ORAL DAILY
Qty: 90 TAB | Refills: 1 | Status: SHIPPED | OUTPATIENT
Start: 2017-12-19 | End: 2018-05-25 | Stop reason: SDUPTHER

## 2017-12-21 ENCOUNTER — DOCUMENTATION ONLY (OUTPATIENT)
Dept: SURGERY | Age: 52
End: 2017-12-21

## 2017-12-22 ENCOUNTER — OFFICE VISIT (OUTPATIENT)
Dept: SURGERY | Age: 52
End: 2017-12-22

## 2017-12-22 VITALS
SYSTOLIC BLOOD PRESSURE: 138 MMHG | HEIGHT: 69 IN | DIASTOLIC BLOOD PRESSURE: 88 MMHG | BODY MASS INDEX: 28.58 KG/M2 | HEART RATE: 67 BPM | WEIGHT: 193 LBS

## 2017-12-22 DIAGNOSIS — L72.3 SEBACEOUS CYST: Primary | ICD-10-CM

## 2017-12-22 NOTE — PROGRESS NOTES
HISTORY OF PRESENT ILLNESS  Rosmery Vides is a 47 y.o. male. HPI NEW patient referral for consultation by Dr. Pauline Bustillos for a LEFT sebaceous cyst. The patient reports LEFT nipple swelling , redness and discomfort in the LEFT breast. He saw his PCP last Friday who did an I&D and was able to expel copious amounts of a purulent drainage. The patient has had issues with sebaceous cysts for many years and gets them mainly in his scalp. The patient states he takes spironolactone for the problem that helps with the cysts in his head but has cysts appear in other parts of his body. Right now he has several on his earlobe. The patient does not want more antibiotics and is not sure what can be done at this point. He did have a nipple piercing on that nipple and removed it several years ago. No family history of breast or ovarian cancer  No imaging    Review of Systems   Constitutional: Negative. HENT: Negative. Eyes: Negative. Respiratory: Negative. Cardiovascular: Negative. Gastrointestinal: Negative. Genitourinary: Negative. Musculoskeletal: Negative. Skin: Negative. Neurological: Negative. Endo/Heme/Allergies: Negative. Psychiatric/Behavioral: Negative.         Physical Exam    ASSESSMENT and PLAN  {ASSESSMENT/PLAN:09093}

## 2017-12-22 NOTE — MR AVS SNAPSHOT
Visit Information Date & Time Provider Department Dept. Phone Encounter #  
 12/22/2017 47:94 AM Cami Love MD [address] at [address] [place] [address] [id number] Your Appointments 12/27/2017 11:40 AM  
Follow Up with MD [address] at [facility]) Appt Note: 1 week follow up [address]  
  
   
 [address] Upcoming Health Maintenance Date Due DTaP/Tdap/Td series (1 - Tdap) 6/15/1985 Influenza Age 5 to Adult 8/1/2017 COLONOSCOPY 11/7/2018 Allergies as of 12/22/2017  Review Complete On: 12/22/2017 By: Kristina Ng RN Severity Noted Reaction Type Reactions Sulfa (Sulfonamide Antibiotics)  03/06/2015    Other (comments) Current Immunizations  Never Reviewed No immunizations on file. Not reviewed this visit You Were Diagnosed With   
  
 Codes Comments Sebaceous cyst    -  Primary ICD-10-CM: L72.3 ICD-9-CM: 706. 2 Vitals BP Pulse Height(growth percentile) Weight(growth percentile) BMI Smoking Status 138/88 67 5' 9\" (1.753 m) 193 lb (87.5 kg) 28.5 kg/m2 Former Smoker Vitals History BMI and BSA Data Body Mass Index Body Surface Area 28.5 kg/m 2 2.06 m 2 Preferred Pharmacy Pharmacy Name Phone [facility] [phone] Your Updated Medication List  
  
   
This list is accurate as of: 12/22/17 11:40 AM.  Always use your most recent med list.  
  
  
  
  
 ALPRAZolam 0.25 mg tablet Commonly known as:  Kevin Ketty Take 1 Tab by mouth daily as needed for Anxiety or Sleep. Indications: anxiety  
  
 betamethasone dipropionate 0.05 % ointment Commonly known as:  Magdaline Rip Apply  to affected area two (2) times a day. clindamycin 1 % external solution Commonly known as:  CLEOCIN T  
use thin film on affected area  
  
 desoximetasone 0.05 % topical cream  
Commonly known as:  TOPICORT Apply  to affected area two (2) times a day. hydroCHLOROthiazide 25 mg tablet Commonly known as:  HYDRODIURIL Take 1 Tab by mouth daily. pantoprazole 40 mg tablet Commonly known as:  PROTONIX Take 1 Tab by mouth daily. pravastatin 20 mg tablet Commonly known as:  PRAVACHOL Take 1 Tab by mouth nightly. sildenafil (antihypertensive) 20 mg tablet Commonly known as:  REVATIO Take 3-5 pills once daily as needed  
  
 spironolactone 100 mg tablet Commonly known as:  ALDACTONE Take 1 Tab by mouth daily. DO NOT TAKE WITH POTASSIUM  
  
 testosterone cypionate 200 mg/mL injection Commonly known as:  DEPOTESTOTERONE CYPIONATE 1 mL by IntraMUSCular route every fourteen (14) days. Max Daily Amount: 200 mg. Please dispense 10 ml bottle TRUVADA 200-300 mg per tablet Generic drug:  emtricitabine-tenofovir (TDF) Take 1 Tab by mouth daily. Patient Instructions Learning About Physical Activity What is physical activity? Physical activity is any kind of activity that gets your body moving. The types of physical activity that can help you get fit and stay healthy include: · Aerobic or \"cardio\" activities that make your heart beat faster and make you breathe harder, such as brisk walking, riding a bike, or running. Aerobic activities strengthen your heart and lungs and build up your endurance. · Strength training activities that make your muscles work against, or \"resist,\" something, such as lifting weights or doing push-ups. These activities help tone and strengthen your muscles. · Stretches that allow you to move your joints and muscles through their full range of motion. Stretching helps you be more flexible and avoid injury. What are the benefits of physical activity? Being active is one of the best things you can do to get fit and stay healthy. It helps you to: · Feel stronger and have more energy to do all the things you like to do. · Focus better at school or work and perform better in sports. · Feel, think, and sleep better. · Reach and stay at a healthy weight. · Lose fat and build lean muscle. · Lower your risk for serious health problems. · Keep your bones, muscles, and joints strong. Being fit lets you do more physical activity. And it lets you work out harder without as much effort. How can you make physical activity part of your life? Get at least 30 minutes of exercise on most days of the week. Walking is a good choice. You also may want to do other activities, such as running, swimming, cycling, or playing tennis or team sports. Pick activities that you like-ones that make your heart beat faster, your muscles stronger, and your muscles and joints more flexible. If you find more than one thing you like doing, do them all. You don't have to do the same thing every day. Get your heart pumping every day. Any activity that makes your heart beat faster and keeps it at that rate for a while counts. Here are some great ways to get your heart beating faster: · Go for a brisk walk, run, or bike ride. · Go for a hike or swim. · Go in-line skating. · Play a game of touch football, basketball, or soccer. · Ride a bike. · Play tennis or racquetball. · Climb stairs. Even some household chores can be aerobic-just do them at a faster pace. Vacuuming, raking or mowing the lawn, sweeping the garage, and washing and waxing the car all can help get your heart rate up. Strengthen your muscles during the week. You don't have to lift heavy weights or grow big, bulky muscles to get stronger. Doing a few simple activities that make your muscles work against, or \"resist,\" something can help you get stronger. For example, you can: · Do push-ups or sit-ups, which use your own body weight as resistance. · Lift weights or dumbbells or use stretch bands at home or in a gym or community center. Stretch your muscles often. Stretching will help you as you become more active. It can help you stay flexible, loosen tight muscles, and avoid injury. It can also help improve your balance and posture and can be a great way to relax. Be sure to stretch the muscles you'll be using when you work out. It's best to warm your muscles slightly before you stretch them. Walk or do some other light aerobic activity for a few minutes, and then start stretching. When you stretch your muscles: · Do it slowly. Stretching is not about going fast or making sudden movements. · Don't push or bounce during a stretch. · Hold each stretch for at least 15 to 30 seconds, if you can. You should feel a stretch in the muscle, but not pain. · Breathe out as you do the stretch. Then breathe in as you hold the stretch. Don't hold your breath. If you're worried about how more activity might affect your health, have a checkup before you start. Follow any special advice your doctor gives you for getting a smart start. Where can you learn more? Go to http://cleve-beverly.info/. Enter C305 in the search box to learn more about \"Learning About Physical Activity. \" Current as of: March 13, 2017 Content Version: 11.4 © 3075-2014 [employer]. Care instructions adapted under license by [employer] (which disclaims liability or warranty for this information). If you have questions about a medical condition or this instruction, always ask your healthcare professional. [address] any warranty or liability for your use of this information. Introducing [facility] & HEALTH SERVICES! Dear Yair Napoles: Thank you for requesting a CIQUAL account.   Our records indicate that you already have an active Exalead account. You can access your account anytime at [email or web address] Did you know that you can access your hospital and ER discharge instructions at any time in Exalead? You can also review all of your test results from your hospital stay or ER visit. Additional Information If you have questions, please visit the Frequently Asked Questions section of the Ex[employer]ad website at [email or web address] Remember, Exalead is NOT to be used for urgent needs. For medical emergencies, dial 911. Now available from your iPhone and Android! Please provide this summary of care documentation to your next provider. Your primary care clinician is listed as Renee Gregory. If you have any questions after today's visit, please call [phone].

## 2017-12-22 NOTE — PATIENT INSTRUCTIONS

## 2017-12-22 NOTE — PROGRESS NOTES
HISTORY OF PRESENT ILLNESS  Lawrence Baxter is a 47 y.o. male. HPI  NEW patient referral for consultation by Dr. Chun Ogden for a LEFT sebaceous cyst. The patient reports LEFT nipple swelling , redness and discomfort in the LEFT breast. He saw his PCP last Friday who did an I&D and was able to expel copious amounts of a purulent drainage. The patient has had issues with sebaceous cysts for many years and gets them mainly in his scalp. The patient states he takes spironolactone for the problem that helps with the cysts in his head but has cysts appear in other parts of his body. Right now he has several on his earlobe. The patient does not want more antibiotics and is not sure what can be done at this point. He did have a nipple piercing on that nipple and removed it several years ago. No family history of breast or ovarian cancer    No imaging    Past Medical History:   Diagnosis Date    Anxiety     Borderline hypertension     Cystic acne     Diverticulitis of colon 7/2014    Eczema     Dr. Cara Naranjo GERD (gastroesophageal reflux disease)     Hyperlipidemia     10/2014 chol 198, ldl 140, hdl 43    Male hypogonadism 2012    Dr. Lourdes Amado. psa 0.8 6/2014, psa 1.0    Skin abscess     MSSA 7/2014    Tubular adenoma 11/2013    Vitamin B12 deficiency     Vitamin D deficiency        Past Surgical History:   Procedure Laterality Date    HX CHOLECYSTECTOMY  2005    HX COLONOSCOPY  11/7/13    Dr. Vani Romero.  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 DO NOT TAKE WITH POTASSIUM 90 Tab 1    pantoprazole (PROTONIX) 40 mg tablet Take 1 Tab by mouth daily. 90 Tab 1    ALPRAZolam (XANAX) 0.25 mg tablet Take 1 Tab by mouth daily as needed for Anxiety or Sleep. Indications: anxiety 90 Tab 1    sildenafil (REVATIO) 20 mg tablet Take 3-5 pills once daily as needed 90 Tab 2    pravastatin (PRAVACHOL) 20 mg tablet Take 1 Tab by mouth nightly. 90 Tab 1    testosterone cypionate (DEPOTESTOTERONE CYPIONATE) 200 mg/mL injection 1 mL by IntraMUSCular route every fourteen (14) days. Max Daily Amount: 200 mg. Please dispense 10 ml bottle 10 mL 1    hydroCHLOROthiazide (HYDRODIURIL) 25 mg tablet Take 1 Tab by mouth daily. 90 Tab 1    clindamycin (CLEOCIN T) 1 % external solution use thin film on affected area 60 mL 3    TRUVADA 200-300 mg per tablet Take 1 Tab by mouth daily. 0    desoximetasone (TOPICORT) 0.05 % topical cream Apply  to affected area two (2) times a day. 30 g 3    betamethasone dipropionate (DIPROLENE) 0.05 % ointment Apply  to affected area two (2) times a day. 60 g 1     No current facility-administered medications on file prior to visit. Allergies   Allergen Reactions    Sulfa (Sulfonamide Antibiotics) Other (comments)           ROS  Constitutional: Negative    HENT: Negative. Eyes: Negative. Respiratory: Negative. Cardiovascular: Negative. Gastrointestinal: Negative. Genitourinary: Negative. Musculoskeletal: Negative. Skin: Negative. Neurological: Negative. Endo/Heme/Allergies: Negative. Psychiatric/Behavioral: Negative. Physical Exam   Cardiovascular: Normal rate and normal heart sounds.     Pulmonary/Chest: Right breast exhibits no inverted nipple, no mass, no nipple discharge, no skin change and no tenderness. Left breast exhibits mass and skin change. Left breast exhibits no inverted nipple, no nipple discharge and no tenderness. Breasts are symmetrical.       Lymphadenopathy:     He has no cervical adenopathy. Right cervical: No superficial cervical, no deep cervical and no posterior cervical adenopathy present. Left cervical: No superficial cervical, no deep cervical and no posterior cervical adenopathy present. He has no axillary adenopathy. Right axillary: No pectoral and no lateral adenopathy present. Left axillary: No pectoral and no lateral adenopathy present. BREAST ULTRASOUND  Indication: Superficial LEFT nipple nodule  Technique: The area was scanned using a high-frequency linear-array near-field transducer  Findings: Small, hypoechoic area located close to the skin surface, through transmission  Impression: Residual sebaceous cyst   Disposition: Antibiotic rx and f/u in 1 year    ASSESSMENT and PLAN    ICD-10-CM ICD-9-CM    1. Sebaceous cyst L72.3 706.2      Pt s/p I&D of sebaceous cyst on LEFT nipple and presents with cyst recurrence. Discussed options including I&D, excision and observation. Pt elected for I&D but wants to schedule for a later date. Will schedule I&D of LEFT nipple sebaceous cyst recurrence for next Wednesday. This plan was reviewed with the patient and patient agrees. All questions were answered.     Written by Aman Antony, as dictated by Dr. Riley Goss MD.

## 2017-12-27 ENCOUNTER — OFFICE VISIT (OUTPATIENT)
Dept: SURGERY | Age: 52
End: 2017-12-27

## 2017-12-27 VITALS
WEIGHT: 193 LBS | HEIGHT: 69 IN | SYSTOLIC BLOOD PRESSURE: 126 MMHG | DIASTOLIC BLOOD PRESSURE: 83 MMHG | BODY MASS INDEX: 28.58 KG/M2 | HEART RATE: 79 BPM

## 2017-12-27 DIAGNOSIS — L72.3 SEBACEOUS CYST: Primary | ICD-10-CM

## 2017-12-27 NOTE — MR AVS SNAPSHOT
Visit Information Date & Time Provider Department Dept. Phone Encounter #  
 12/27/2017 00:01 AM China Pichardo MD [address] at [address] [place] [address] [id number] Your Appointments 1/12/2018 11:20 AM  
Follow Up with MD [address] at [facility]) Appt Note: 2 week follow up/ cp$ [id number] LS  
 [address]  
  
   
 [address] Upcoming Health Maintenance Date Due DTaP/Tdap/Td series (1 - Tdap) 6/15/1985 Influenza Age 5 to Adult 8/1/2017 COLONOSCOPY 11/7/2018 Allergies as of 12/27/2017  Review Complete On: 12/27/2017 By: Jocelin Doshi RN Severity Noted Reaction Type Reactions Sulfa (Sulfonamide Antibiotics)  03/06/2015    Other (comments) Current Immunizations  Never Reviewed No immunizations on file. Not reviewed this visit You Were Diagnosed With   
  
 Codes Comments Sebaceous cyst    -  Primary ICD-10-CM: L72.3 ICD-9-CM: 706. 2 Vitals BP Pulse Height(growth percentile) Weight(growth percentile) BMI Smoking Status 126/83 79 5' 9\" (1.753 m) 193 lb (87.5 kg) 28.5 kg/m2 Former Smoker BMI and BSA Data Body Mass Index Body Surface Area 28.5 kg/m 2 2.06 m 2 Preferred Pharmacy Pharmacy Name Phone [facility] [address] [phone] Your Updated Medication List  
  
   
This list is accurate as of: 12/27/17  4:20 PM.  Always use your most recent med list.  
  
  
  
  
 ALPRAZolam 0.25 mg tablet Commonly known as:  Mosetta Harp Take 1 Tab by mouth daily as needed for Anxiety or Sleep. Indications: anxiety  
  
 betamethasone dipropionate 0.05 % ointment Commonly known as:  Rissa Draft Apply  to affected area two (2) times a day. clindamycin 1 % external solution Commonly known as:  CLEOCIN T  
use thin film on affected area  
  
 desoximetasone 0.05 % topical cream  
Commonly known as:  TOPICORT Apply  to affected area two (2) times a day. hydroCHLOROthiazide 25 mg tablet Commonly known as:  HYDRODIURIL Take 1 Tab by mouth daily. pantoprazole 40 mg tablet Commonly known as:  PROTONIX Take 1 Tab by mouth daily. pravastatin 20 mg tablet Commonly known as:  PRAVACHOL Take 1 Tab by mouth nightly. sildenafil (antihypertensive) 20 mg tablet Commonly known as:  REVATIO Take 3-5 pills once daily as needed  
  
 spironolactone 100 mg tablet Commonly known as:  ALDACTONE Take 1 Tab by mouth daily. DO NOT TAKE WITH POTASSIUM  
  
 testosterone cypionate 200 mg/mL injection Commonly known as:  DEPOTESTOTERONE CYPIONATE 1 mL by IntraMUSCular route every fourteen (14) days. Max Daily Amount: 200 mg. Please dispense 10 ml bottle TRUVADA 200-300 mg per tablet Generic drug:  emtricitabine-tenofovir (TDF) Take 1 Tab by mouth daily. Patient Instructions Skin Abscess: Care Instructions Your Care Instructions A skin abscess is a bacterial infection that forms a pocket of pus. A boil is a kind of skin abscess. The doctor may have cut an opening in the abscess so that the pus can drain out. You may have gauze in the cut so that the abscess will stay open and keep draining. You may need antibiotics. You will need to follow up with your doctor to make sure the infection has gone away. The doctor has checked you carefully, but problems can develop later. If you notice any problems or new symptoms, get medical treatment right away. Follow-up care is a key part of your treatment and safety. Be sure to make and go to all appointments, and call your doctor if you are having problems. It's also a good idea to know your test results and keep a list of the medicines you take. How can you care for yourself at home? · Apply warm and dry compresses, a heating pad set on low, or a hot water bottle 3 or 4 times a day for pain. Keep a cloth between the heat source and your skin. · If your doctor prescribed antibiotics, take them as directed. Do not stop taking them just because you feel better. You need to take the full course of antibiotics. · Take pain medicines exactly as directed. ¨ If the doctor gave you a prescription medicine for pain, take it as prescribed. ¨ If you are not taking a prescription pain medicine, ask your doctor if you can take an over-the-counter medicine. · Keep your bandage clean and dry. Change the bandage whenever it gets wet or dirty, or at least one time a day. · If the abscess was packed with gauze: 
¨ Keep follow-up appointments to have the gauze changed or removed. If the doctor instructed you to remove the gauze, gently pull out all of the gauze when your doctor tells you to. ¨ After the gauze is removed, soak the area in warm water for 15 to 20 minutes 2 times a day, until the wound closes. When should you call for help? Call your doctor now or seek immediate medical care if: 
? · You have signs of worsening infection, such as: 
¨ Increased pain, swelling, warmth, or redness. ¨ Red streaks leading from the infected skin. ¨ Pus draining from the wound. ¨ A fever. ? Watch closely for changes in your health, and be sure to contact your doctor if: 
? · You do not get better as expected. Where can you learn more? Go to http://cleve-beverly.info/. Enter K548 in the search box to learn more about \"Skin Abscess: Care Instructions. \" Current as of: October 13, 2016 Content Version: 11.4 © 6038-0355 [employer]. Care instructions adapted under license by [employer] (which disclaims liability or warranty for this information).  If you have questions about a medical condition or this instruction, always ask your healthcare professional. Vani Agustin [facility] disclaims any warranty or liability for your use of this information. Skin Abscess: Care Instructions Your Care Instructions A skin abscess is a bacterial infection that forms a pocket of pus. A boil is a kind of skin abscess. The doctor may have cut an opening in the abscess so that the pus can drain out. You may have gauze in the cut so that the abscess will stay open and keep draining. You may need antibiotics. You will need to follow up with your doctor to make sure the infection has gone away. The doctor has checked you carefully, but problems can develop later. If you notice any problems or new symptoms, get medical treatment right away. Follow-up care is a key part of your treatment and safety. Be sure to make and go to all appointments, and call your doctor if you are having problems. It's also a good idea to know your test results and keep a list of the medicines you take. How can you care for yourself at home? · Apply warm and dry compresses, a heating pad set on low, or a hot water bottle 3 or 4 times a day for pain. Keep a cloth between the heat source and your skin. · If your doctor prescribed antibiotics, take them as directed. Do not stop taking them just because you feel better. You need to take the full course of antibiotics. · Take pain medicines exactly as directed. ¨ If the doctor gave you a prescription medicine for pain, take it as prescribed. ¨ If you are not taking a prescription pain medicine, ask your doctor if you can take an over-the-counter medicine. · Keep your bandage clean and dry. Change the bandage whenever it gets wet or dirty, or at least one time a day. · If the abscess was packed with gauze: 
¨ Keep follow-up appointments to have the gauze changed or removed. If the doctor instructed you to remove the gauze, gently pull out all of the gauze when your doctor tells you to.  
¨ After the gauze is removed, soak the area in warm water for 15 to 20 minutes 2 times a day, until the wound closes. When should you call for help? Call your doctor now or seek immediate medical care if: 
? · You have signs of worsening infection, such as: 
¨ Increased pain, swelling, warmth, or redness. ¨ Red streaks leading from the infected skin. ¨ Pus draining from the wound. ¨ A fever. ? Watch closely for changes in your health, and be sure to contact your doctor if: 
? · You do not get better as expected. Where can you learn more? Go to http://cleve-beverly.info/. Enter C934 in the search box to learn more about \"Skin Abscess: Care Instructions. \" Current as of: October 13, 2016 Content Version: 11.4 © 9680-8823 [employer]. Care instructions adapted under license by [employer] (which disclaims liability or warranty for this information). If you have questions about a medical condition or this instruction, always ask your healthcare professional. [place] [address] any warranty or liability for your use of this information. Introducing [facility]! Dear Genaro Wells: Thank you for requesting a SmartMove account. Our records indicate that you already have an active SmartMove account. You can access your account anytime at [email or web address]. NextMedium/Tactile [employer] Technology Did you know that you can access your hospital and ER discharge instructions at any time in SmartMove? You can also review all of your test results from your hospital stay or ER visit. Additional Information If you have questions, please visit the Frequently Asked Questions section of the SmartMove website at [email or web address]. NextMedium/Solar Junctiont/. Remember, SmartMove is NOT to be used for urgent needs. For medical emergencies, dial 911. Now available from your iPhone and Android! Please provide this summary of care documentation to your next provider. Your primary care clinician is listed as Susan Peacock.  If you have any questions after today's visit, please call [phone].

## 2017-12-27 NOTE — PROGRESS NOTES
HISTORY OF PRESENT ILLNESS  Norma Aquino is a 47 y.o. male. HPI  ESTABLISHED patient here for I&D of LEFT nipple sebaceous cyst abscess. He was here on Friday but wanted to wait to have I&D. Dr. Agustín Miramontes did an I&D of LEFT nipple and not all the drainage came out. Has tenderness to the area. History of cystic acne on head and neck. Completed antibiotic.       ROS    Physical Exam    ASSESSMENT and PLAN  {ASSESSMENT/PLAN:[id number]}

## 2017-12-27 NOTE — COMMUNICATION BODY
HISTORY OF PRESENT ILLNESS  Lolly Monge is a 47 y.o. male. HPI  NEW patient referral for consultation by Dr. Jorge Paz for a LEFT sebaceous cyst. The patient reports LEFT nipple swelling , redness and discomfort in the LEFT breast. He saw his PCP last Friday who did an I&D and was able to expel copious amounts of a purulent drainage. The patient has had issues with sebaceous cysts for many years and gets them mainly in his scalp. The patient states he takes spironolactone for the problem that helps with the cysts in his head but has cysts appear in other parts of his body. Right now he has several on his earlobe. The patient does not want more antibiotics and is not sure what can be done at this point. He did have a nipple piercing on that nipple and removed it several years ago. No family history of breast or ovarian cancer    No imaging    Past Medical History:   Diagnosis Date    Anxiety     Borderline hypertension     Cystic acne     Diverticulitis of colon 7/2014    Eczema     Dr. Karine Joshi GERD (gastroesophageal reflux disease)     Hyperlipidemia     10/2014 chol 198, ldl 140, hdl 43    Male hypogonadism 2012    Dr. Jose Carrasco. psa 0.8 6/2014, psa 1.0    Skin abscess     MSSA 7/2014    Tubular adenoma 11/2013    Vitamin B12 deficiency     Vitamin D deficiency        Past Surgical History:   Procedure Laterality Date    HX CHOLECYSTECTOMY  2005    HX COLONOSCOPY  11/7/13    Dr. Alberto Baeza.  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 DO NOT TAKE WITH POTASSIUM 90 Tab 1    pantoprazole (PROTONIX) 40 mg tablet Take 1 Tab by mouth daily. 90 Tab 1    ALPRAZolam (XANAX) 0.25 mg tablet Take 1 Tab by mouth daily as needed for Anxiety or Sleep. Indications: anxiety 90 Tab 1    sildenafil (REVATIO) 20 mg tablet Take 3-5 pills once daily as needed 90 Tab 2    pravastatin (PRAVACHOL) 20 mg tablet Take 1 Tab by mouth nightly. 90 Tab 1    testosterone cypionate (DEPOTESTOTERONE CYPIONATE) 200 mg/mL injection 1 mL by IntraMUSCular route every fourteen (14) days. Max Daily Amount: 200 mg. Please dispense 10 ml bottle 10 mL 1    hydroCHLOROthiazide (HYDRODIURIL) 25 mg tablet Take 1 Tab by mouth daily. 90 Tab 1    clindamycin (CLEOCIN T) 1 % external solution use thin film on affected area 60 mL 3    TRUVADA 200-300 mg per tablet Take 1 Tab by mouth daily. 0    desoximetasone (TOPICORT) 0.05 % topical cream Apply  to affected area two (2) times a day. 30 g 3    betamethasone dipropionate (DIPROLENE) 0.05 % ointment Apply  to affected area two (2) times a day. 60 g 1     No current facility-administered medications on file prior to visit. Allergies   Allergen Reactions    Sulfa (Sulfonamide Antibiotics) Other (comments)           ROS  Constitutional: Negative    HENT: Negative. Eyes: Negative. Respiratory: Negative. Cardiovascular: Negative. Gastrointestinal: Negative. Genitourinary: Negative. Musculoskeletal: Negative. Skin: Negative. Neurological: Negative. Endo/Heme/Allergies: Negative. Psychiatric/Behavioral: Negative. Physical Exam   Cardiovascular: Normal rate and normal heart sounds.     Pulmonary/Chest: Right breast exhibits no inverted nipple, no mass, no nipple discharge, no skin change and no tenderness. Left breast exhibits mass and skin change. Left breast exhibits no inverted nipple, no nipple discharge and no tenderness. Breasts are symmetrical.       Lymphadenopathy:     He has no cervical adenopathy. Right cervical: No superficial cervical, no deep cervical and no posterior cervical adenopathy present. Left cervical: No superficial cervical, no deep cervical and no posterior cervical adenopathy present. He has no axillary adenopathy. Right axillary: No pectoral and no lateral adenopathy present. Left axillary: No pectoral and no lateral adenopathy present. BREAST ULTRASOUND  Indication: Superficial LEFT nipple nodule  Technique: The area was scanned using a high-frequency linear-array near-field transducer  Findings: Small, hypoechoic area located close to the skin surface, through transmission  Impression: Residual sebaceous cyst   Disposition: Antibiotic rx and f/u in 1 year    ASSESSMENT and PLAN    ICD-10-CM ICD-9-CM    1. Sebaceous cyst L72.3 706.2      Pt s/p I&D of sebaceous cyst on LEFT nipple and presents with cyst recurrence. Discussed options including I&D, excision and observation. Pt elected for I&D but wants to schedule for a later date. Will schedule I&D of LEFT nipple sebaceous cyst recurrence for next Wednesday. This plan was reviewed with the patient and patient agrees. All questions were answered.     Written by Jacy Billingsley, as dictated by Dr. Elizabeth Ross MD.

## 2017-12-27 NOTE — LETTER
NOTIFICATION RETURN TO WORK  
 
12/27/2017 11:58 AM 
 
[facility] 
[facility] [address] To Whom It May Concern: 
 
Lexii Gambino is currently under the care of [address] [place] [address]. He was in the office today for a procedure. He can return to work tomorrow. He has a follow-up appointment in our office on 1/12/18. If there are questions or concerns, please have the patient contact our office. Sincerely, 
 
 
 
Daniel Pal.  EbenezerRN, BSN for 
 
Padmini Delong MD

## 2017-12-27 NOTE — PATIENT INSTRUCTIONS
Skin Abscess: Care Instructions  Your Care Instructions    A skin abscess is a bacterial infection that forms a pocket of pus. A boil is a kind of skin abscess. The doctor may have cut an opening in the abscess so that the pus can drain out. You may have gauze in the cut so that the abscess will stay open and keep draining. You may need antibiotics. You will need to follow up with your doctor to make sure the infection has gone away. The doctor has checked you carefully, but problems can develop later. If you notice any problems or new symptoms, get medical treatment right away. Follow-up care is a key part of your treatment and safety. Be sure to make and go to all appointments, and call your doctor if you are having problems. It's also a good idea to know your test results and keep a list of the medicines you take. How can you care for yourself at home? · Apply warm and dry compresses, a heating pad set on low, or a hot water bottle 3 or 4 times a day for pain. Keep a cloth between the heat source and your skin. · If your doctor prescribed antibiotics, take them as directed. Do not stop taking them just because you feel better. You need to take the full course of antibiotics. · Take pain medicines exactly as directed. ¨ If the doctor gave you a prescription medicine for pain, take it as prescribed. ¨ If you are not taking a prescription pain medicine, ask your doctor if you can take an over-the-counter medicine. · Keep your bandage clean and dry. Change the bandage whenever it gets wet or dirty, or at least one time a day. · If the abscess was packed with gauze:  ¨ Keep follow-up appointments to have the gauze changed or removed. If the doctor instructed you to remove the gauze, gently pull out all of the gauze when your doctor tells you to. ¨ After the gauze is removed, soak the area in warm water for 15 to 20 minutes 2 times a day, until the wound closes. When should you call for help?   Call your doctor now or seek immediate medical care if:  ? · You have signs of worsening infection, such as:  ¨ Increased pain, swelling, warmth, or redness. ¨ Red streaks leading from the infected skin. ¨ Pus draining from the wound. ¨ A fever. ? Watch closely for changes in your health, and be sure to contact your doctor if:  ? · You do not get better as expected. Where can you learn more? Go to http://cleve-beverly.info/. Enter C350 in the search box to learn more about \"Skin Abscess: Care Instructions. \"  Current as of: October 13, 2016  Content Version: 11.4  © 4052-4893 [employer]. Care instructions adapted under license by [employer] (which disclaims liability or warranty for this information). If you have questions about a medical condition or this instruction, always ask your healthcare professional. [address] any warranty or liability for your use of this information. Skin Abscess: Care Instructions  Your Care Instructions    A skin abscess is a bacterial infection that forms a pocket of pus. A boil is a kind of skin abscess. The doctor may have cut an opening in the abscess so that the pus can drain out. You may have gauze in the cut so that the abscess will stay open and keep draining. You may need antibiotics. You will need to follow up with your doctor to make sure the infection has gone away. The doctor has checked you carefully, but problems can develop later. If you notice any problems or new symptoms, get medical treatment right away. Follow-up care is a key part of your treatment and safety. Be sure to make and go to all appointments, and call your doctor if you are having problems. It's also a good idea to know your test results and keep a list of the medicines you take. How can you care for yourself at home? · Apply warm and dry compresses, a heating pad set on low, or a hot water bottle 3 or 4 times a day for pain. Keep a cloth between the heat source and your skin. · If your doctor prescribed antibiotics, take them as directed. Do not stop taking them just because you feel better. You need to take the full course of antibiotics. · Take pain medicines exactly as directed. ¨ If the doctor gave you a prescription medicine for pain, take it as prescribed. ¨ If you are not taking a prescription pain medicine, ask your doctor if you can take an over-the-counter medicine. · Keep your bandage clean and dry. Change the bandage whenever it gets wet or dirty, or at least one time a day. · If the abscess was packed with gauze:  ¨ Keep follow-up appointments to have the gauze changed or removed. If the doctor instructed you to remove the gauze, gently pull out all of the gauze when your doctor tells you to. ¨ After the gauze is removed, soak the area in warm water for 15 to 20 minutes 2 times a day, until the wound closes. When should you call for help? Call your doctor now or seek immediate medical care if:  ? · You have signs of worsening infection, such as:  ¨ Increased pain, swelling, warmth, or redness. ¨ Red streaks leading from the infected skin. ¨ Pus draining from the wound. ¨ A fever. ? Watch closely for changes in your health, and be sure to contact your doctor if:  ? · You do not get better as expected. Where can you learn more? Go to http://cleve-beverly.info/. Enter Q578 in the search box to learn more about \"Skin Abscess: Care Instructions. \"  Current as of: October 13, 2016  Content Version: 11.4  © 5243-1654 [employer]. Care instructions adapted under license by [employer] (which disclaims liability or warranty for this information). If you have questions about a medical condition or this instruction, always ask your healthcare professional. [address] any warranty or liability for your use of this information.

## 2017-12-27 NOTE — PROGRESS NOTES
HISTORY OF PRESENT ILLNESS  Lexii Gambino is a 47 y.o. male. HPI   ESTABLISHED patient here for I&D of LEFT nipple sebaceous cyst abscess. He was here on Friday but wanted to wait to have I&D. Dr. Mejia Cohen did an I&D of LEFT nipple and not all the drainage came out. Has tenderness to the area. History of cystic acne on head and neck. Completed antibiotic. Past Medical History:   Diagnosis Date    Anxiety     Borderline hypertension     Cystic acne     Diverticulitis of colon 7/2014    Eczema     Dr. Ailyn Carrasco GERD (gastroesophageal reflux disease)     Hyperlipidemia     10/2014 chol 198, ldl 140, hdl 43    Male hypogonadism 2012    Dr. Clark Lernerp. psa 0.8 6/2014, psa 1.0    Skin abscess     MSSA 7/2014    Tubular adenoma 11/2013    Vitamin B12 deficiency     Vitamin D deficiency        Past Surgical History:   Procedure Laterality Date    HX CHOLECYSTECTOMY  2005    HX COLONOSCOPY  11/7/13    Dr. Ranjit Watts. several tubular adenomas       Social History     Social History    Marital status: N/A     Spouse name: N/A    Number of children: 3    Years of education: N/A     Occupational History   [employer] (was counselor) [employer]     Social History Main Topics    Smoking status: Former Smoker    Smokeless tobacco: Former User     Quit date: 1/1/2007      Comment: 0-2 drinks per wek    Alcohol use Yes    Drug use: Not on file    Sexual activity: Yes     Other Topics Concern    Not on file     Social History Narrative    3 kids, 1 grandson (Kita)       Current Outpatient Prescriptions on File Prior to Visit   Medication Sig Dispense Refill    spironolactone (ALDACTONE) 100 mg tablet Take 1 Tab by mouth daily. DO NOT TAKE WITH POTASSIUM 90 Tab 1    pantoprazole (PROTONIX) 40 mg tablet Take 1 Tab by mouth daily. 90 Tab 1    ALPRAZolam (XANAX) 0.25 mg tablet Take 1 Tab by mouth daily as needed for Anxiety or Sleep.  Indications: anxiety 90 Tab 1    sildenafil (REVATIO) 20 mg tablet Take 3-5 pills once daily as needed 90 Tab 2    pravastatin (PRAVACHOL) 20 mg tablet Take 1 Tab by mouth nightly. 90 Tab 1    testosterone cypionate (DEPOTESTOTERONE CYPIONATE) 200 mg/mL injection 1 mL by IntraMUSCular route every fourteen (14) days. Max Daily Amount: 200 mg. Please dispense 10 ml bottle 10 mL 1    hydroCHLOROthiazide (HYDRODIURIL) 25 mg tablet Take 1 Tab by mouth daily. 90 Tab 1    clindamycin (CLEOCIN T) 1 % external solution use thin film on affected area 60 mL 3    TRUVADA 200-300 mg per tablet Take 1 Tab by mouth daily. 0    betamethasone dipropionate (DIPROLENE) 0.05 % ointment Apply  to affected area two (2) times a day. 60 g 1    desoximetasone (TOPICORT) 0.05 % topical cream Apply  to affected area two (2) times a day. 30 g 3     No current facility-administered medications on file prior to visit. Allergies   Allergen Reactions    Sulfa (Sulfonamide Antibiotics) Other (comments)       ROS   Constitutional: Negative    HENT: Negative. Eyes: Negative. Respiratory: Negative. Cardiovascular: Negative. Gastrointestinal: Negative. Genitourinary: Negative. Musculoskeletal: Negative. Skin: Negative. Neurological: Negative. Endo/Heme/Allergies: Negative. Psychiatric/Behavioral: Negative. Physical Exam   Cardiovascular: Normal rate and regular rhythm. Pulmonary/Chest:         Right breast exhibits no inverted nipple, no mass, no nipple discharge, no skin change and no tenderness. Left breast exhibits mass and skin change. Left breast exhibits no inverted nipple, no nipple discharge and no tenderness. Breasts are symmetrical.   Lymphadenopathy:     He has no cervical adenopathy. He has no axillary adenopathy. I & D  Indication : Abscess left breast/nipple. Prep : We cleaned the skin with alcohol. Anesthesia : We anesthetized with Xylocaine. Guidance :  We used real-time ultrasound guidance. Technique : We made a nicking incision in the skin  Yield : This yielded 1cc of purulent fluid. Result : This substantially decompressed the swelling. Dressing : We placed a clean dressing,Clean dry dressing applied. Tolerance : The patient tolerated the procedure well,The patient's pain was decreased at the completion of the procedure. Disposition : The patient was sent home in stable condition  The patient will be on antibiotic medication for more days,We will followup as noted in the Plan and Next Appointment. ASSESSMENT and PLAN  Encounter Diagnoses   Name Primary?  Sebaceous cyst Yes      Pt s/p I&D of sebaceous cyst on LEFT nipple and presents with cyst recurrence. Discussed options including I&D, excision and observation. Pt elected for I&D. Consent obtained. Pt tolerated procedure well. F/U 2 weeks. This plan was reviewed with the patient and patient agrees. All questions were answered.     Written by lyla Angeles, as dictated by Dr. Tim Salazar MD.

## 2017-12-28 NOTE — COMMUNICATION BODY
HISTORY OF PRESENT ILLNESS  Marleny Beard is a 47 y.o. male. HPI   ESTABLISHED patient here for I&D of LEFT nipple sebaceous cyst abscess. He was here on Friday but wanted to wait to have I&D. Dr. Rakel Garza did an I&D of LEFT nipple and not all the drainage came out. Has tenderness to the area. History of cystic acne on head and neck. Completed antibiotic. Past Medical History:   Diagnosis Date    Anxiety     Borderline hypertension     Cystic acne     Diverticulitis of colon 7/2014    Eczema     Dr. Nigel Miranda GERD (gastroesophageal reflux disease)     Hyperlipidemia     10/2014 chol 198, ldl 140, hdl 43    Male hypogonadism 2012    Dr. Fer Gan. psa 0.8 6/2014, psa 1.0    Skin abscess     MSSA 7/2014    Tubular adenoma 11/2013    Vitamin B12 deficiency     Vitamin D deficiency        Past Surgical History:   Procedure Laterality Date    HX CHOLECYSTECTOMY  2005    HX COLONOSCOPY  11/7/13    Dr. Óscar Cm. 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 DO NOT TAKE WITH POTASSIUM 90 Tab 1    pantoprazole (PROTONIX) 40 mg tablet Take 1 Tab by mouth daily. 90 Tab 1    ALPRAZolam (XANAX) 0.25 mg tablet Take 1 Tab by mouth daily as needed for Anxiety or Sleep.  Indications: anxiety 90 Tab 1    sildenafil (REVATIO) 20 mg tablet Take 3-5 pills once daily as needed 90 Tab 2    pravastatin (PRAVACHOL) 20 mg tablet Take 1 Tab by mouth nightly. 90 Tab 1    testosterone cypionate (DEPOTESTOTERONE CYPIONATE) 200 mg/mL injection 1 mL by IntraMUSCular route every fourteen (14) days. Max Daily Amount: 200 mg. Please dispense 10 ml bottle 10 mL 1    hydroCHLOROthiazide (HYDRODIURIL) 25 mg tablet Take 1 Tab by mouth daily. 90 Tab 1    clindamycin (CLEOCIN T) 1 % external solution use thin film on affected area 60 mL 3    TRUVADA 200-300 mg per tablet Take 1 Tab by mouth daily. 0    betamethasone dipropionate (DIPROLENE) 0.05 % ointment Apply  to affected area two (2) times a day. 60 g 1    desoximetasone (TOPICORT) 0.05 % topical cream Apply  to affected area two (2) times a day. 30 g 3     No current facility-administered medications on file prior to visit. Allergies   Allergen Reactions    Sulfa (Sulfonamide Antibiotics) Other (comments)       ROS   Constitutional: Negative    HENT: Negative. Eyes: Negative. Respiratory: Negative. Cardiovascular: Negative. Gastrointestinal: Negative. Genitourinary: Negative. Musculoskeletal: Negative. Skin: Negative. Neurological: Negative. Endo/Heme/Allergies: Negative. Psychiatric/Behavioral: Negative. Physical Exam   Cardiovascular: Normal rate and regular rhythm. Pulmonary/Chest:         Right breast exhibits no inverted nipple, no mass, no nipple discharge, no skin change and no tenderness. Left breast exhibits mass and skin change. Left breast exhibits no inverted nipple, no nipple discharge and no tenderness. Breasts are symmetrical.   Lymphadenopathy:     He has no cervical adenopathy. He has no axillary adenopathy. I & D  Indication : Abscess left breast/nipple. Prep : We cleaned the skin with alcohol. Anesthesia : We anesthetized with Xylocaine. Guidance :  We used real-time ultrasound guidance. Technique : We made a nicking incision in the skin  Yield : This yielded 1cc of purulent fluid. Result : This substantially decompressed the swelling. Dressing : We placed a clean dressing,Clean dry dressing applied. Tolerance : The patient tolerated the procedure well,The patient's pain was decreased at the completion of the procedure. Disposition : The patient was sent home in stable condition  The patient will be on antibiotic medication for more days,We will followup as noted in the Plan and Next Appointment. ASSESSMENT and PLAN  Encounter Diagnoses   Name Primary?  Sebaceous cyst Yes      Pt s/p I&D of sebaceous cyst on LEFT nipple and presents with cyst recurrence. Discussed options including I&D, excision and observation. Pt elected for I&D. Consent obtained. Pt tolerated procedure well. F/U 2 weeks. This plan was reviewed with the patient and patient agrees. All questions were answered.     Written by lyla Bella, as dictated by Dr. Katlyn Jones MD.

## 2018-01-12 ENCOUNTER — OFFICE VISIT (OUTPATIENT)
Dept: SURGERY | Age: 53
End: 2018-01-12

## 2018-01-12 VITALS
HEART RATE: 78 BPM | SYSTOLIC BLOOD PRESSURE: 130 MMHG | HEIGHT: 69 IN | WEIGHT: 193 LBS | DIASTOLIC BLOOD PRESSURE: 83 MMHG | BODY MASS INDEX: 28.58 KG/M2

## 2018-01-12 DIAGNOSIS — N60.82 SEBACEOUS CYST OF BREAST, LEFT: Primary | ICD-10-CM

## 2018-01-12 DIAGNOSIS — N60.82 SEBACEOUS CYST OF SKIN OF BREAST, LEFT: Primary | ICD-10-CM

## 2018-01-12 NOTE — PROGRESS NOTES
HISTORY OF PRESENT ILLNESS  Alexander Galdamez is a 47 y.o. male. HPI ESTABLISHED patient here today for follow up LEFT breast sebaceous cyst. The patient states he still is experiencing a \"knot\" summer his LEFT nipple. Is requesting an I&D so he can eventually put his LEFT nipple ring back in. Had an I&D performed 12/27/2017 with packing and the site has healed but the knot remains.        ROS    Physical Exam    ASSESSMENT and PLAN  {ASSESSMENT/PLAN:[id number]}

## 2018-01-12 NOTE — PROGRESS NOTES
HISTORY OF PRESENT ILLNESS  Silvestre Draper is a 47 y.o. male. HPI  ESTABLISHED patient here today for follow up LEFT breast sebaceous cyst. The patient states he still is experiencing a \"knot\" summer his LEFT nipple. Is requesting an I&D so he can eventually put his LEFT nipple ring back in. Had an I&D performed 12/27/2017 with packing and the site has healed but the knot remains. Past Medical History:   Diagnosis Date    Anxiety     Borderline hypertension     Cystic acne     Diverticulitis of colon 7/2014    Eczema     Dr. Méndez Pontifisaac GERD (gastroesophageal reflux disease)     Hyperlipidemia     10/2014 chol 198, ldl 140, hdl 43    Male hypogonadism 2012    Dr. Vasquez Bachelor. psa 0.8 6/2014, psa 1.0    Skin abscess     MSSA 7/2014    Tubular adenoma 11/2013    Vitamin B12 deficiency     Vitamin D deficiency        Past Surgical History:   Procedure Laterality Date    HX CHOLECYSTECTOMY  2005    HX COLONOSCOPY  11/7/13    Dr. Peyton Ham. 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 DO NOT TAKE WITH POTASSIUM 90 Tab 1    pantoprazole (PROTONIX) 40 mg tablet Take 1 Tab by mouth daily.  90 Tab 1    ALPRAZolam (XANAX) 0.25 mg tablet Take 1 Tab by mouth daily as needed for Anxiety or Sleep. Indications: anxiety 90 Tab 1    sildenafil (REVATIO) 20 mg tablet Take 3-5 pills once daily as needed 90 Tab 2    pravastatin (PRAVACHOL) 20 mg tablet Take 1 Tab by mouth nightly. 90 Tab 1    testosterone cypionate (DEPOTESTOTERONE CYPIONATE) 200 mg/mL injection 1 mL by IntraMUSCular route every fourteen (14) days. Max Daily Amount: 200 mg. Please dispense 10 ml bottle 10 mL 1    hydroCHLOROthiazide (HYDRODIURIL) 25 mg tablet Take 1 Tab by mouth daily. 90 Tab 1    clindamycin (CLEOCIN T) 1 % external solution use thin film on affected area 60 mL 3    TRUVADA 200-300 mg per tablet Take 1 Tab by mouth daily. 0    desoximetasone (TOPICORT) 0.05 % topical cream Apply  to affected area two (2) times a day. 30 g 3    betamethasone dipropionate (DIPROLENE) 0.05 % ointment Apply  to affected area two (2) times a day. 60 g 1     No current facility-administered medications on file prior to visit. Allergies   Allergen Reactions    Sulfa (Sulfonamide Antibiotics) Other (comments)           ROS    Physical Exam   Cardiovascular: Normal rate and normal heart sounds. Pulmonary/Chest: Right breast exhibits no inverted nipple, no mass, no nipple discharge, no skin change and no tenderness. Left breast exhibits no inverted nipple, no mass, no nipple discharge, no skin change and no tenderness. Breasts are symmetrical.   Lymphadenopathy:     He has no cervical adenopathy. Right cervical: No superficial cervical, no deep cervical and no posterior cervical adenopathy present. Left cervical: No superficial cervical, no deep cervical and no posterior cervical adenopathy present. He has no axillary adenopathy. Right axillary: No pectoral and no lateral adenopathy present. Left axillary: No pectoral and no lateral adenopathy present. BREAST ULTRASOUND  Indication: Superficial LEFT Breast nodule on nipple  Technique:   The area was scanned using a high-frequency linear-array near-field transducer  Findings: Small, hypoechoic area located close to the skin surface, through transmission  Impression: Sebaceous cyst   Disposition: Will schedule LEFT breast excision     ASSESSMENT and PLAN    ICD-10-CM ICD-9-CM    1. Sebaceous cyst of breast, left N60.82 610.8      Pt s/p I&D of sebaceous cyst on LEFT nipple and presents with residual cyst wall. Due to hx of nipple piercing and nature of cyst, I advised it will not resolve unless it is excised completely. Discussed this procedure including incision placement and recovery time. Pt understands this and has given consent. Will schedule LEFT breast excision of sebaceous cyst on 01/16/18. This plan was reviewed with the patient and patient agrees. All questions were answered.     Written by Zetta Jeans, as dictated by Dr. Connie Gandhi MD.

## 2018-01-12 NOTE — MR AVS SNAPSHOT
Visit Information Date & Time Provider Department Dept. Phone Encounter #  
 1/12/2018 00:35 AM Binta Franklin MD [address] at [address] [place] [address] [id number] Upcoming Health Maintenance Date Due DTaP/Tdap/Td series (1 - Tdap) 6/15/1985 Influenza Age 5 to Adult 8/1/2017 COLONOSCOPY 11/7/2018 Allergies as of 1/12/2018  Review Complete On: 1/12/2018 By: Joey Cheng RN Severity Noted Reaction Type Reactions Sulfa (Sulfonamide Antibiotics)  03/06/2015    Other (comments) Current Immunizations  Never Reviewed No immunizations on file. Not reviewed this visit You Were Diagnosed With   
  
 Codes Comments Sebaceous cyst of breast, left    -  Primary ICD-10-CM: E52.30 ICD-9-CM: 610.8 Vitals BP Pulse Height(growth percentile) Weight(growth percentile) BMI Smoking Status 130/83 78 5' 9\" (1.753 m) 193 lb (87.5 kg) 28.5 kg/m2 Former Smoker BMI and BSA Data Body Mass Index Body Surface Area 28.5 kg/m 2 2.06 m 2 Preferred Pharmacy Pharmacy Name Phone [address] [address] [phone] Your Updated Medication List  
  
   
This list is accurate as of: 1/12/18 11:50 AM.  Always use your most recent med list.  
  
  
  
  
 ALPRAZolam 0.25 mg tablet Commonly known as:  Dominique Grandchild Take 1 Tab by mouth daily as needed for Anxiety or Sleep. Indications: anxiety  
  
 betamethasone dipropionate 0.05 % ointment Commonly known as:  Flor Christine Apply  to affected area two (2) times a day. clindamycin 1 % external solution Commonly known as:  CLEOCIN T  
use thin film on affected area  
  
 desoximetasone 0.05 % topical cream  
Commonly known as:  TOPICORT Apply  to affected area two (2) times a day. hydroCHLOROthiazide 25 mg tablet Commonly known as:  HYDRODIURIL Take 1 Tab by mouth daily. pantoprazole 40 mg tablet Commonly known as:  PROTONIX Take 1 Tab by mouth daily. pravastatin 20 mg tablet Commonly known as:  PRAVACHOL Take 1 Tab by mouth nightly. sildenafil (antihypertensive) 20 mg tablet Commonly known as:  REVATIO Take 3-5 pills once daily as needed  
  
 spironolactone 100 mg tablet Commonly known as:  ALDACTONE Take 1 Tab by mouth daily. DO NOT TAKE WITH POTASSIUM  
  
 testosterone cypionate 200 mg/mL injection Commonly known as:  DEPOTESTOTERONE CYPIONATE 1 mL by IntraMUSCular route every fourteen (14) days. Max Daily Amount: 200 mg. Please dispense 10 ml bottle TRUVADA 200-300 mg per tablet Generic drug:  emtricitabine-tenofovir (TDF) Take 1 Tab by mouth daily. Introducing [facility]! Dear Sebastián Tejeda: Thank you for requesting a [facility] account. Our records indicate that you already have an active [facility] account. You can access your account anytime at [email or web address] Did you know that you can access your hospital and ER discharge instructions at any time in [facility]? You can also review all of your test results from your hospital stay or ER visit. Additional Information If you have questions, please visit the Frequently Asked Questions section of the [employer] website at [email or web address] Remember, [facility] is NOT to be used for urgent needs. For medical emergencies, dial 911. Now available from your iPhone and Android! Please provide this summary of care documentation to your next provider. Your primary care clinician is listed as Fredy Barber. If you have any questions after today's visit, please call [phone].

## 2018-01-12 NOTE — COMMUNICATION BODY
HISTORY OF PRESENT ILLNESS  Cosme Tee is a 47 y.o. male. HPI  ESTABLISHED patient here today for follow up LEFT breast sebaceous cyst. The patient states he still is experiencing a \"knot\" summer his LEFT nipple. Is requesting an I&D so he can eventually put his LEFT nipple ring back in. Had an I&D performed 12/27/2017 with packing and the site has healed but the knot remains. Past Medical History:   Diagnosis Date    Anxiety     Borderline hypertension     Cystic acne     Diverticulitis of colon 7/2014    Eczema     Dr. Shellia Favre GERD (gastroesophageal reflux disease)     Hyperlipidemia     10/2014 chol 198, ldl 140, hdl 43    Male hypogonadism 2012    Dr. Carmen Mitchell. psa 0.8 6/2014, psa 1.0    Skin abscess     MSSA 7/2014    Tubular adenoma 11/2013    Vitamin B12 deficiency     Vitamin D deficiency        Past Surgical History:   Procedure Laterality Date    HX CHOLECYSTECTOMY  2005    HX COLONOSCOPY  11/7/13    Dr. Jose Manley. 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     Social History Main Topics    Smoking status: Former Smoker    Smokeless tobacco: Former User     Quit date: 1/1/2007      Comment: 0-2 drinks per wek    Alcohol use Yes    Drug use: Not on file    Sexual activity: Yes     Other Topics Concern    Not on file     Social History Narrative    3 kids, 1 grandson (Kita)       Current Outpatient Prescriptions on File Prior to Visit   Medication Sig Dispense Refill    spironolactone (ALDACTONE) 100 mg tablet Take 1 Tab by mouth daily. DO NOT TAKE WITH POTASSIUM 90 Tab 1    pantoprazole (PROTONIX) 40 mg tablet Take 1 Tab by mouth daily.  90 Tab 1    ALPRAZolam (XANAX) 0.25 mg tablet Take 1 Tab by mouth daily as needed for Anxiety or Sleep. Indications: anxiety 90 Tab 1    sildenafil (REVATIO) 20 mg tablet Take 3-5 pills once daily as needed 90 Tab 2    pravastatin (PRAVACHOL) 20 mg tablet Take 1 Tab by mouth nightly. 90 Tab 1    testosterone cypionate (DEPOTESTOTERONE CYPIONATE) 200 mg/mL injection 1 mL by IntraMUSCular route every fourteen (14) days. Max Daily Amount: 200 mg. Please dispense 10 ml bottle 10 mL 1    hydroCHLOROthiazide (HYDRODIURIL) 25 mg tablet Take 1 Tab by mouth daily. 90 Tab 1    clindamycin (CLEOCIN T) 1 % external solution use thin film on affected area 60 mL 3    TRUVADA 200-300 mg per tablet Take 1 Tab by mouth daily. 0    desoximetasone (TOPICORT) 0.05 % topical cream Apply  to affected area two (2) times a day. 30 g 3    betamethasone dipropionate (DIPROLENE) 0.05 % ointment Apply  to affected area two (2) times a day. 60 g 1     No current facility-administered medications on file prior to visit. Allergies   Allergen Reactions    Sulfa (Sulfonamide Antibiotics) Other (comments)           ROS    Physical Exam   Cardiovascular: Normal rate and normal heart sounds. Pulmonary/Chest: Right breast exhibits no inverted nipple, no mass, no nipple discharge, no skin change and no tenderness. Left breast exhibits no inverted nipple, no mass, no nipple discharge, no skin change and no tenderness. Breasts are symmetrical.   Lymphadenopathy:     He has no cervical adenopathy. Right cervical: No superficial cervical, no deep cervical and no posterior cervical adenopathy present. Left cervical: No superficial cervical, no deep cervical and no posterior cervical adenopathy present. He has no axillary adenopathy. Right axillary: No pectoral and no lateral adenopathy present. Left axillary: No pectoral and no lateral adenopathy present. BREAST ULTRASOUND  Indication: Superficial LEFT Breast nodule on nipple  Technique:   The area was scanned using a high-frequency linear-array near-field transducer  Findings: Small, hypoechoic area located close to the skin surface, through transmission  Impression: Sebaceous cyst   Disposition: Will schedule LEFT breast excision     ASSESSMENT and PLAN    ICD-10-CM ICD-9-CM    1. Sebaceous cyst of breast, left N60.82 610.8      Pt s/p I&D of sebaceous cyst on LEFT nipple and presents with residual cyst wall. Due to hx of nipple piercing and nature of cyst, I advised it will not resolve unless it is excised completely. Discussed this procedure including incision placement and recovery time. Pt understands this and has given consent. Will schedule LEFT breast excision of sebaceous cyst on 01/16/18. This plan was reviewed with the patient and patient agrees. All questions were answered.     Written by Samuel Cavazos, as dictated by Dr. Chavo Lozano MD.

## 2018-01-12 NOTE — PATIENT INSTRUCTIONS

## 2018-01-12 NOTE — LETTER
1/12/2018 11:49 AM 
 
Mr. Naveen Eid [facility] [address] 
[facility] [address] To Whom It May Concern: 
 
Naveen Fountain is currently under the care of [facility]. He will be having a procedure on Tuesday, 1/16/18. He will need a  to take him to and from the hospital.   
 
If there are questions or concerns, please have the patient contact our office. Sincerely, 
 
 
 
 
Rosalva Sol, EMIR, BSN for  
 
José Vanegas MD

## 2018-01-15 ENCOUNTER — ANESTHESIA EVENT (OUTPATIENT)
Dept: SURGERY | Age: 53
End: 2018-01-15
Payer: COMMERCIAL

## 2018-01-15 NOTE — PERIOP NOTES
[facility]  PREOPERATIVE INSTRUCTIONS    Surgery Date:   1/16/2018 at 18      Surgery arrival time given by surgeon: YES  1. Report  to the 2nd Floor Admitting Desk on the day of your surgery. Bring your insurance card, photo identification, and any copayment (if applicable). 2. You must have a responsible adult to drive you home and stay with you the first 24 hours after surgery if you are going home the same day of your surgery. 3. Nothing to eat or drink after midnight the night before surgery. This means NO water, gum, mints, coffee, juice, etc.    4. MEDICATIONS TO TAKE THE MORNING OF SURGERY WITH A SIP OF WATER :Protonix  5. No alcoholic beverages 24 hours before and after your surgery. 6. If you are being admitted to the hospital,please leave personal belongings/luggage in your car until you have an assigned hospital room number. ( The hospital discharge time is 12 PM NOON. Your adult  should be at the hospital prior to the noon discharge time unless otherwise instructed.)   7. STOP Aspirin and/or any non-steroidal anti-inflammatory drugs (i.e. Ibuprofen, Naproxen, Advil, Aleve) as directed by your surgeon. You may take Tylenol. Stop herbal supplements 1 week prior to  surgery. 8. If you are currently taking Plavix, Coumadin,or any other blood-thinning/ anticoagulant medication contact your surgeon for instructions. 9. Wear comfortable clothes. Wear your glasses instead of contacts. Please leave all money, jewelry and valuables at home. No make up, particularly mascara, the day of surgery. 10.  REMOVE ALL body piercings, rings,and jewelry and leave at home. Wear your hair loose or down, no pony-tails, buns, or any metal hair clips. 11. If you shower the morning of surgery, please do not apply any lotions, powders, or deodorants afterwards. Do not shave any body area within 24 hours of your surgery. 12. Please follow all instructions to avoid any potential surgical cancellation. 13.   Should your physical condition change, (i.e. fever, cold, flu, etc.) please notify your surgeon as soon as possible. 14. It is important to be on time. If a situation occurs where you may be delayed, please call:   [phone] on the day of surgery. 15. The Preadmission Testing staff can be reached at [phone]. 16. Special instructions: free  parking  17. The patient was contacted  via phone. He  verbalize  understanding of all instructions does not  need reinforcement.

## 2018-01-16 ENCOUNTER — ANESTHESIA (OUTPATIENT)
Dept: SURGERY | Age: 53
End: 2018-01-16
Payer: COMMERCIAL

## 2018-01-16 ENCOUNTER — HOSPITAL ENCOUNTER (OUTPATIENT)
Age: 53
Setting detail: OUTPATIENT SURGERY
Discharge: HOME | End: 2018-01-16
Attending: SURGERY | Admitting: SURGERY
Payer: COMMERCIAL

## 2018-01-16 VITALS
TEMPERATURE: 97.5 F | RESPIRATION RATE: 19 BRPM | WEIGHT: 190.48 LBS | SYSTOLIC BLOOD PRESSURE: 126 MMHG | DIASTOLIC BLOOD PRESSURE: 77 MMHG | HEIGHT: 69 IN | OXYGEN SATURATION: 100 % | HEART RATE: 59 BPM | BODY MASS INDEX: 28.21 KG/M2

## 2018-01-16 DIAGNOSIS — N60.82 SEBACEOUS CYST OF SKIN OF BREAST, LEFT: ICD-10-CM

## 2018-01-16 PROCEDURE — 77030018836 HC SOL IRR NACL ICUM -A: Performed by: SURGERY

## 2018-01-16 PROCEDURE — 74011000250 HC RX REV CODE- 250: Performed by: SURGERY

## 2018-01-16 PROCEDURE — 77030002933 HC SUT MCRYL J&J -A: Performed by: SURGERY

## 2018-01-16 PROCEDURE — 76030000002 HC AMB SURG OR TIME FIRST 0.: Performed by: SURGERY

## 2018-01-16 PROCEDURE — 76060000073 HC AMB SURG ANES FIRST 0.5 HR: Performed by: SURGERY

## 2018-01-16 PROCEDURE — 74011250636 HC RX REV CODE- 250/636: Performed by: ANESTHESIOLOGY

## 2018-01-16 PROCEDURE — 74011250636 HC RX REV CODE- 250/636

## 2018-01-16 PROCEDURE — 74011250637 HC RX REV CODE- 250/637: Performed by: SURGERY

## 2018-01-16 PROCEDURE — 76210000050 HC AMBSU PH II REC 0.5 TO 1 HR: Performed by: SURGERY

## 2018-01-16 PROCEDURE — 74011000250 HC RX REV CODE- 250

## 2018-01-16 PROCEDURE — 77030031139 HC SUT VCRL2 J&J -A: Performed by: SURGERY

## 2018-01-16 PROCEDURE — 77030011267 HC ELECTRD BLD COVD -A: Performed by: SURGERY

## 2018-01-16 PROCEDURE — 77030010507 HC ADH SKN DERMBND J&J -B: Performed by: SURGERY

## 2018-01-16 PROCEDURE — 77030020782 HC GWN BAIR PAWS FLX 3M -B

## 2018-01-16 PROCEDURE — 76210000040 HC AMBSU PH I REC FIRST 0.5 HR: Performed by: SURGERY

## 2018-01-16 PROCEDURE — 88304 TISSUE EXAM BY PATHOLOGIST: CPT | Performed by: SURGERY

## 2018-01-16 RX ORDER — ONDANSETRON 2 MG/ML
INJECTION INTRAMUSCULAR; INTRAVENOUS AS NEEDED
Status: DISCONTINUED | OUTPATIENT
Start: 2018-01-16 | End: 2018-01-16 | Stop reason: HOSPADM

## 2018-01-16 RX ORDER — HYDROMORPHONE HYDROCHLORIDE 2 MG/ML
.25-1 INJECTION, SOLUTION INTRAMUSCULAR; INTRAVENOUS; SUBCUTANEOUS
Status: DISCONTINUED | OUTPATIENT
Start: 2018-01-16 | End: 2018-01-16 | Stop reason: HOSPADM

## 2018-01-16 RX ORDER — LIDOCAINE HYDROCHLORIDE 10 MG/ML
0.1 INJECTION, SOLUTION EPIDURAL; INFILTRATION; INTRACAUDAL; PERINEURAL AS NEEDED
Status: DISCONTINUED | OUTPATIENT
Start: 2018-01-16 | End: 2018-01-16 | Stop reason: HOSPADM

## 2018-01-16 RX ORDER — DIPHENHYDRAMINE HYDROCHLORIDE 50 MG/ML
12.5 INJECTION, SOLUTION INTRAMUSCULAR; INTRAVENOUS AS NEEDED
Status: DISCONTINUED | OUTPATIENT
Start: 2018-01-16 | End: 2018-01-16 | Stop reason: HOSPADM

## 2018-01-16 RX ORDER — SODIUM CHLORIDE, SODIUM LACTATE, POTASSIUM CHLORIDE, CALCIUM CHLORIDE 600; 310; 30; 20 MG/100ML; MG/100ML; MG/100ML; MG/100ML
100 INJECTION, SOLUTION INTRAVENOUS CONTINUOUS
Status: DISCONTINUED | OUTPATIENT
Start: 2018-01-16 | End: 2018-01-16 | Stop reason: HOSPADM

## 2018-01-16 RX ORDER — KETOROLAC TROMETHAMINE 30 MG/ML
INJECTION, SOLUTION INTRAMUSCULAR; INTRAVENOUS AS NEEDED
Status: DISCONTINUED | OUTPATIENT
Start: 2018-01-16 | End: 2018-01-16 | Stop reason: HOSPADM

## 2018-01-16 RX ORDER — PROPOFOL 10 MG/ML
INJECTION, EMULSION INTRAVENOUS
Status: DISCONTINUED | OUTPATIENT
Start: 2018-01-16 | End: 2018-01-16 | Stop reason: HOSPADM

## 2018-01-16 RX ORDER — SODIUM CHLORIDE 0.9 % (FLUSH) 0.9 %
5-10 SYRINGE (ML) INJECTION AS NEEDED
Status: DISCONTINUED | OUTPATIENT
Start: 2018-01-16 | End: 2018-01-16 | Stop reason: HOSPADM

## 2018-01-16 RX ORDER — SODIUM CHLORIDE, SODIUM LACTATE, POTASSIUM CHLORIDE, CALCIUM CHLORIDE 600; 310; 30; 20 MG/100ML; MG/100ML; MG/100ML; MG/100ML
125 INJECTION, SOLUTION INTRAVENOUS CONTINUOUS
Status: DISCONTINUED | OUTPATIENT
Start: 2018-01-16 | End: 2018-01-16 | Stop reason: HOSPADM

## 2018-01-16 RX ORDER — FENTANYL CITRATE 50 UG/ML
INJECTION, SOLUTION INTRAMUSCULAR; INTRAVENOUS AS NEEDED
Status: DISCONTINUED | OUTPATIENT
Start: 2018-01-16 | End: 2018-01-16 | Stop reason: HOSPADM

## 2018-01-16 RX ORDER — SODIUM CHLORIDE 0.9 % (FLUSH) 0.9 %
5-10 SYRINGE (ML) INJECTION EVERY 8 HOURS
Status: DISCONTINUED | OUTPATIENT
Start: 2018-01-16 | End: 2018-01-16 | Stop reason: HOSPADM

## 2018-01-16 RX ORDER — BUPIVACAINE HYDROCHLORIDE AND EPINEPHRINE 5; 5 MG/ML; UG/ML
30 INJECTION, SOLUTION EPIDURAL; INTRACAUDAL; PERINEURAL ONCE
Status: DISCONTINUED | OUTPATIENT
Start: 2018-01-16 | End: 2018-01-16 | Stop reason: HOSPADM

## 2018-01-16 RX ORDER — ONDANSETRON 2 MG/ML
4 INJECTION INTRAMUSCULAR; INTRAVENOUS AS NEEDED
Status: DISCONTINUED | OUTPATIENT
Start: 2018-01-16 | End: 2018-01-16 | Stop reason: HOSPADM

## 2018-01-16 RX ORDER — MIDAZOLAM HYDROCHLORIDE 1 MG/ML
INJECTION, SOLUTION INTRAMUSCULAR; INTRAVENOUS AS NEEDED
Status: DISCONTINUED | OUTPATIENT
Start: 2018-01-16 | End: 2018-01-16 | Stop reason: HOSPADM

## 2018-01-16 RX ORDER — LIDOCAINE HYDROCHLORIDE 20 MG/ML
INJECTION, SOLUTION INFILTRATION; PERINEURAL AS NEEDED
Status: DISCONTINUED | OUTPATIENT
Start: 2018-01-16 | End: 2018-01-16 | Stop reason: HOSPADM

## 2018-01-16 RX ORDER — HYDROCODONE BITARTRATE AND ACETAMINOPHEN 7.5; 325 MG/1; MG/1
1 TABLET ORAL ONCE
Status: COMPLETED | OUTPATIENT
Start: 2018-01-16 | End: 2018-01-16

## 2018-01-16 RX ORDER — HYDROCODONE BITARTRATE AND ACETAMINOPHEN 7.5; 325 MG/1; MG/1
1 TABLET ORAL
Qty: 25 TAB | Refills: 0 | Status: SHIPPED | OUTPATIENT
Start: 2018-01-16 | End: 2018-05-25 | Stop reason: ALTCHOICE

## 2018-01-16 RX ORDER — GLYCOPYRROLATE 0.2 MG/ML
INJECTION INTRAMUSCULAR; INTRAVENOUS AS NEEDED
Status: DISCONTINUED | OUTPATIENT
Start: 2018-01-16 | End: 2018-01-16 | Stop reason: HOSPADM

## 2018-01-16 RX ORDER — LIDOCAINE HYDROCHLORIDE AND EPINEPHRINE 10; 10 MG/ML; UG/ML
30 INJECTION, SOLUTION INFILTRATION; PERINEURAL ONCE
Status: DISCONTINUED | OUTPATIENT
Start: 2018-01-16 | End: 2018-01-16 | Stop reason: HOSPADM

## 2018-01-16 RX ADMIN — FENTANYL CITRATE 25 MCG: 50 INJECTION, SOLUTION INTRAMUSCULAR; INTRAVENOUS at 10:09

## 2018-01-16 RX ADMIN — PROPOFOL 100 MCG/KG/MIN: 10 INJECTION, EMULSION INTRAVENOUS at 10:00

## 2018-01-16 RX ADMIN — KETOROLAC TROMETHAMINE 30 MG: 30 INJECTION, SOLUTION INTRAMUSCULAR; INTRAVENOUS at 10:27

## 2018-01-16 RX ADMIN — FENTANYL CITRATE 50 MCG: 50 INJECTION, SOLUTION INTRAMUSCULAR; INTRAVENOUS at 09:21

## 2018-01-16 RX ADMIN — LIDOCAINE HYDROCHLORIDE 80 MG: 20 INJECTION, SOLUTION INFILTRATION; PERINEURAL at 09:55

## 2018-01-16 RX ADMIN — FENTANYL CITRATE 50 MCG: 50 INJECTION, SOLUTION INTRAMUSCULAR; INTRAVENOUS at 09:17

## 2018-01-16 RX ADMIN — SODIUM CHLORIDE, SODIUM LACTATE, POTASSIUM CHLORIDE, AND CALCIUM CHLORIDE 100 ML/HR: 600; 310; 30; 20 INJECTION, SOLUTION INTRAVENOUS at 08:43

## 2018-01-16 RX ADMIN — HYDROCODONE BITARTRATE AND ACETAMINOPHEN 1 TABLET: 7.5; 325 TABLET ORAL at 10:51

## 2018-01-16 RX ADMIN — GLYCOPYRROLATE 0.2 MG: 0.2 INJECTION INTRAMUSCULAR; INTRAVENOUS at 09:55

## 2018-01-16 RX ADMIN — MIDAZOLAM HYDROCHLORIDE 1 MG: 1 INJECTION, SOLUTION INTRAMUSCULAR; INTRAVENOUS at 09:55

## 2018-01-16 RX ADMIN — FENTANYL CITRATE 25 MCG: 50 INJECTION, SOLUTION INTRAMUSCULAR; INTRAVENOUS at 09:58

## 2018-01-16 RX ADMIN — MIDAZOLAM HYDROCHLORIDE 2 MG: 1 INJECTION, SOLUTION INTRAMUSCULAR; INTRAVENOUS at 09:53

## 2018-01-16 RX ADMIN — MIDAZOLAM HYDROCHLORIDE 1 MG: 1 INJECTION, SOLUTION INTRAMUSCULAR; INTRAVENOUS at 09:59

## 2018-01-16 RX ADMIN — ONDANSETRON 4 MG: 2 INJECTION INTRAMUSCULAR; INTRAVENOUS at 10:02

## 2018-01-16 NOTE — OP NOTES
[address] REPORT    Diane Suresh  MR#: [id number]  : 1965  ACCOUNT #: [de-identified]   DATE OF SERVICE: 2018    PREOPERATIVE DIAGNOSIS:  Probable sebaceous cyst of the left nipple areolar complex. POSTOPERATIVE DIAGNOSIS:  Probable sebaceous cyst of the left nipple areolar complex. PROCEDURE PERFORMED:  Excision of sebaceous cyst, left nipple areolar complex, approximately 2 cm. SURGEON:  Maryana Smith MD    ASSISTANT:  NA.    ANESTHESIA:  Local standby. SPECIMENS REMOVED:  Left breast cyst.    ESTIMATED BLOOD LOSS:  Minimal.    COMPLICATIONS:  None. IMPLANTS:  NA.    INDICATIONS:  The patient is a 39-year-old male who has had bilateral nipple piercings and his left piercing had gotten infected. He has been treated with multiple courses of antibiotics as well  as incision and drainage, but still has a small mass in this area. Desires excision. DESCRIPTION OF PROCEDURE:  After adequate sedation, sterile prep and drape and local anesthesia 1% lidocaine mixed with 0.5% Marcaine, an elliptical incision was made around the incision and drainage scar to include a fistulous tract where the previous nipple piercing had been. This was excised in its entirety down to healthy subcutaneous tissue. Specimen sent to pathology. Additional tissue behind the nipple was removed and the incision was anesthetized with 0.25% Marcaine, closed with the interrupted 3-0 Vicryl, running 4-0 Monocryl in skin. The patient tolerated the procedure without any complication. He was taken to recovery in stable condition.       MD ANGELICA Mcdonald / ANKUR  D: 2018 11:08     T: 2018 15:30  JOB #: [id number]  CC: Elizabeth Moore MD

## 2018-01-16 NOTE — PROGRESS NOTES
January 16, 2018       RE: Silvestre Draper      To Whom It May Concern,    This is to certify that Silvestre Draper was here at [facility] for surgical procedure on 1/16/2018 and was unable to attend jury duty. Please feel free to contact my office if you have any questions or concerns [phone]. Thank you for your assistance in this matter.       Sincerely,  Bharath Hess MD

## 2018-01-16 NOTE — BRIEF OP NOTE
BRIEF OPERATIVE NOTE    Date of Procedure: 1/16/2018   Preoperative Diagnosis: SEBACEOUS CYST Left breast    Postoperative Diagnosis: SEBACEOUS CYST Left breast    Procedure(s):  LEFT BREAST NIPPLE EXCISION OF SEBACEOUS CYST  Surgeon(s) and Role:     * Ritika Levine MD - Primary         Assistant Staff:       Surgical Staff:  Circ-1: Seble Vogel RN  Scrub RN-1: Anabell Wade RN  Surg Asst-1: Kirill Montiel RN  Event Time In   Incision Start 1008   Incision Close 1019     Anesthesia: MAC   Estimated Blood Loss: minimal  Specimens:   ID Type Source Tests Collected by Time Destination   1 : left breast sebaceous cyst Preservative Breast  Ritika Levine MD 7/93/3386 1008 Pathology      Findings: probable sebaceous cyst , probable  Complications: none  Implants: * No implants in log *

## 2018-01-16 NOTE — ANESTHESIA POSTPROCEDURE EVALUATION
Post-Anesthesia Evaluation and Assessment    Patient: Mitra Bravo MRN: [id number]  SSN: [id number]    YOB: 1965  Age: 47 y.o. Sex: male       Cardiovascular Function/Vital Signs  Visit Vitals    /71    Pulse 64    Temp 36.4 °C (97.5 °F)    Resp 20    Ht 5' 9\" (1.753 m)    Wt 86.4 kg (190 lb 7.6 oz)    SpO2 100%    BMI 28.13 kg/m2       Patient is status post MAC anesthesia for Procedure(s):  LEFT BREAST NIPPLE EXCISION OF SEBACEOUS CYST. Nausea/Vomiting: None    Postoperative hydration reviewed and adequate. Pain:  Pain Scale 1: Numeric (0 - 10) (01/16/18 1113)  Pain Intensity 1: 4 (01/16/18 1113)   Managed    Neurological Status:   Neuro (WDL): Within Defined Limits (01/16/18 0820)   At baseline    Mental Status and Level of Consciousness: Arousable    Pulmonary Status:   O2 Device: Room air (01/16/18 1114)   Adequate oxygenation and airway patent    Complications related to anesthesia: None    Post-anesthesia assessment completed.  No concerns    Signed By: Sandro Crockett MD     January 16, 2018

## 2018-01-16 NOTE — H&P
HISTORY OF PRESENT ILLNESS  Rahel Saunders is a 47 y.o. male. HPI  ESTABLISHED patient here today for follow up LEFT breast sebaceous cyst. The patient states he still is experiencing a \"knot\" summer his LEFT nipple. Is requesting an I&D so he can eventually put his LEFT nipple ring back in. Had an I&D performed 12/27/2017 with packing and the site has healed but the knot remains.             Past Medical History:   Diagnosis Date    Anxiety      Borderline hypertension      Cystic acne      Diverticulitis of colon 7/2014    Eczema       Dr. Marshal Ramírez GERD (gastroesophageal reflux disease)      Hyperlipidemia       10/2014 chol 198, ldl 140, hdl 43    Male hypogonadism 2012     Dr. Rene Carreon. psa 0.8 6/2014, psa 1.0    Skin abscess       MSSA 7/2014    Tubular adenoma 11/2013    Vitamin B12 deficiency      Vitamin D deficiency                 Past Surgical History:   Procedure Laterality Date    HX CHOLECYSTECTOMY   2005    HX COLONOSCOPY   11/7/13     Dr. Graciela Carrizales. several tubular adenomas         Social History            Social History    Marital status: N/A       Spouse name: N/A    Number of children: 3    Years of education: N/A           Occupational History   [employer] (was counselor) [employer]              Social History Main Topics    Smoking status: Former Smoker    Smokeless tobacco: Former User       Quit date: 1/1/2007         Comment: 0-2 drinks per wek    Alcohol use Yes     Drug use: Not on file    Sexual activity: Yes           Other Topics Concern    Not on file          Social History Narrative     3 kids, 1 grandson (Kita)                Current Outpatient Prescriptions on File Prior to Visit   Medication Sig Dispense Refill    spironolactone (ALDACTONE) 100 mg tablet Take 1 Tab by mouth daily. DO NOT TAKE WITH POTASSIUM 90 Tab 1    pantoprazole (PROTONIX) 40 mg tablet Take 1 Tab by mouth daily.  90 Tab 1    ALPRAZolam (XANAX) 0.25 mg tablet Take 1 Tab by mouth daily as needed for Anxiety or Sleep. Indications: anxiety 90 Tab 1    sildenafil (REVATIO) 20 mg tablet Take 3-5 pills once daily as needed 90 Tab 2    pravastatin (PRAVACHOL) 20 mg tablet Take 1 Tab by mouth nightly. 90 Tab 1    testosterone cypionate (DEPOTESTOTERONE CYPIONATE) 200 mg/mL injection 1 mL by IntraMUSCular route every fourteen (14) days. Max Daily Amount: 200 mg. Please dispense 10 ml bottle 10 mL 1    hydroCHLOROthiazide (HYDRODIURIL) 25 mg tablet Take 1 Tab by mouth daily. 90 Tab 1    clindamycin (CLEOCIN T) 1 % external solution use thin film on affected area 60 mL 3    TRUVADA 200-300 mg per tablet Take 1 Tab by mouth daily.   0    desoximetasone (TOPICORT) 0.05 % topical cream Apply  to affected area two (2) times a day. 30 g 3    betamethasone dipropionate (DIPROLENE) 0.05 % ointment Apply  to affected area two (2) times a day. 60 g 1      No current facility-administered medications on file prior to visit.               Allergies   Allergen Reactions    Sulfa (Sulfonamide Antibiotics) Other (comments)            ROS     Physical Exam   Cardiovascular: Normal rate and normal heart sounds. Pulmonary/Chest: Right breast exhibits no inverted nipple, no mass, no nipple discharge, no skin change and no tenderness. Left breast exhibits no inverted nipple, no mass, no nipple discharge, no skin change and no tenderness. Breasts are symmetrical.   Lymphadenopathy:     He has no cervical adenopathy. Right cervical: No superficial cervical, no deep cervical and no posterior cervical adenopathy present. Left cervical: No superficial cervical, no deep cervical and no posterior cervical adenopathy present. He has no axillary adenopathy. Right axillary: No pectoral and no lateral adenopathy present.         Left axillary: No pectoral and no lateral adenopathy present.     BREAST ULTRASOUND  Indication: Superficial LEFT Breast nodule on nipple  Technique: The area was scanned using a high-frequency linear-array near-field transducer  Findings: Small, hypoechoic area located close to the skin surface, through transmission  Impression: Sebaceous cyst   Disposition: Will schedule LEFT breast excision      ASSESSMENT and PLAN      ICD-10-CM ICD-9-CM     1. Sebaceous cyst of breast, left N60.82 610.8        Pt s/p I&D of sebaceous cyst on LEFT nipple and presents with residual cyst wall. Due to hx of nipple piercing and nature of cyst, I advised it will not resolve unless it is excised completely. Discussed this procedure including incision placement and recovery time. Pt understands this and has given consent. Will schedule LEFT breast excision of sebaceous cyst on 01/16/18. This plan was reviewed with the patient and patient agrees.  All questions were answered.

## 2018-01-16 NOTE — ANESTHESIA PREPROCEDURE EVALUATION
Anesthetic History   No history of anesthetic complications            Review of Systems / Medical History  Patient summary reviewed and pertinent labs reviewed    Pulmonary  Within defined limits                 Neuro/Psych   Within defined limits           Cardiovascular    Hypertension: well controlled              Exercise tolerance: >4 METS     GI/Hepatic/Renal     GERD: well controlled           Endo/Other  Within defined limits           Other Findings              Physical Exam    Airway  Mallampati: II  TM Distance: 4 - 6 cm  Neck ROM: normal range of motion   Mouth opening: Normal     Cardiovascular  Regular rate and rhythm,  S1 and S2 normal,  no murmur, click, rub, or gallop  Rhythm: regular  Rate: normal         Dental  No notable dental hx       Pulmonary  Breath sounds clear to auscultation               Abdominal  GI exam deferred       Other Findings            Anesthetic Plan    ASA: 2  Anesthesia type: MAC          Induction: Intravenous  Anesthetic plan and risks discussed with: Patient

## 2018-01-16 NOTE — DISCHARGE INSTRUCTIONS
Discharge Instructions from Dr. Irene Mackenzie    · I will call you with the pathology results, typically within 1 week from today. · You may shower, but no hot tubs, swimming pools, or baths until your incision is healed. · No heavy lifting with the affected extremity (nothing greater than 5 pounds), and limit its use for the next 4-5 days. · You may use an ice pack for comfort for the next couple of days, but do not place ice directly on the skin. Rather, use a towel or clothing to serve as a barrier between skin and ice to prevent injury. · If I placed a drain, follow the drain instructions provided, especially as you keep a record of the drain output. · Follow medication instructions carefully. · Watch for signs of infection as listed below. · Redness  · Swelling  · Drainage from the incision or from your nipple that appears infected  · Fever over 101 degrees for consecutive readings, or over 99.5 if you are currently undergoing chemotherapy. · Call our office (number is below) for a follow-up appointment. · If you have any problems, our phone number is [phone]. DISCHARGE SUMMARY from your Nurse      PATIENT INSTRUCTIONS    After general anesthesia or intravenous sedation, for 24 hours or while taking prescription Narcotics:  · Limit your activities  · Do not drive and operate hazardous machinery  · Do not make important personal or business decisions  · Do  not drink alcoholic beverages  · If you have not urinated within 8 hours after discharge, please contact your surgeon on call.     Report the following to your surgeon:  · Excessive pain, swelling, redness or odor of or around the surgical area  · Temperature over 100.5  · Nausea and vomiting lasting longer than 4 hours or if unable to take medications  · Any signs of decreased circulation or nerve impairment to extremity: change in color, persistent  numbness, tingling, coldness or increase pain  · Any questions      COUGH AND DEEP BREATHE    Breathing deeply and coughing are very important exercises to do after surgery. Deep breathing and coughing open the little air tubes and air sacks in your lungs. You take deep breaths every day. You may not even notice - it is just something you do when you sigh or yawn. It is a natural exercise you do to keep these air passages open. After surgery, take deep breaths and cough, on purpose. DIRECTIONS:  · Take 10 to 15 slow deep breaths every hour while awake. · Breathe in deeply, and hold it for 2 seconds. · Exhale slowly through puckered lips, like blowing up a balloon. · After every 4th or 5th deep breath, hug your pillow to your chest or belly and give a hard, deep cough. Yes, it will probably hurt. But doing this exercise is a very important part of healing after surgery. Take your pain medicine to help you do this exercise without too much pain. Coughing and deep breathing help prevent bronchitis and pneumonia after surgery. If you had chest or belly surgery, use a pillow as a \"hug verona\" and hold it tightly to your chest or belly when you cough. ANKLE PUMPS    Ankle pumps increase the circulation of oxygenated blood to your lower extremities and decrease your risk for circulation problems such as blood clots. They also stretch the muscles, tendons and ligaments in your foot and ankle, and prevent joint contracture in the ankle and foot, especially after surgeries on the legs. It is important to do ankle pump exercises regularly after surgery because immobility increases your risk for developing a blood clot. Your doctor may also have you take an Aspirin for the next few days as well. If your doctor did not ask you to take an Aspirin, consult with him before starting Aspirin therapy on your own. The exercise is quite simple.      · Slowly point your foot forward, feeling the muscles on the top of your lower leg stretch, and hold this position for 5 seconds. · Next, pull your foot back toward you as far as possible, stretching the calf muscles, and hold that position for 5 seconds. · Repeat with the other foot. · Perform 10 repetitions every hour while awake for both ankles if possible (down and then up with the foot once is one repetition). You should feel gentle stretching of the muscles in your lower leg when doing this exercise. If you feel pain, or your range of motion is limited, don't push too hard. Only go the limit your joint and muscles will let you go. If you have increasing pain, progressively worsening leg warmth or swelling, STOP the exercise and call your doctor. MEDICATION AND   SIDE EFFECT GUIDE    The [facility] MEDICATION AND SIDE EFFECT GUIDE was provided to the PATIENT AND CARE PROVIDER. Information provided includes instruction about drug purpose and common side effects for the following medications:   · Hydrocodone-acetaminophen        These are general instructions for a healthy lifestyle:    *   Please give a list of your current medications to your Primary Care Provider. *   Please update this list whenever your medications are discontinued, doses are changed, or new medications (including over-the-counter products) are added. *   Please carry medication information at all times in case of emergency situations. About Smoking  No smoking / No tobacco products  Avoid exposure to second hand smoke     Surgeon General's Warning:  Quitting smoking now greatly reduces serious risk to your health. Obesity, smoking, and sedentary lifestyle greatly increases your risk for illness and disease. A healthy diet, regular physical exercise & weight monitoring are important for maintaining a healthy lifestyle.       Congestive Heart Failure  You may be retaining fluid if you have a history of heart failure or if you experience any of the following symptoms: Weight gain of 3 pounds or more overnight or 5 pounds in a week, increased swelling in your hands or feet or shortness of breath while lying flat in bed. Please call your doctor as soon as you notice any of these symptoms; do not wait until your next office visit. Recognize signs and symptoms of STROKE:  F -  Face looks uneven  A -  Arms unable to move or move evenly  S -  Speech slurred or non-existent  T -  Time-call 911 as soon as signs and symptoms begin-DO NOT go          back to bed or wait to see if you get better-TIME IS BRAIN. Warning Signs of HEART ATTACK   Call 911 if you have these symptoms:     Chest discomfort. Most heart attacks involve discomfort in the center of the chest that lasts more than a few minutes, or that goes away and comes back. It can feel like uncomfortable pressure, squeezing, fullness, or pain.  Discomfort in other areas of the upper body. Symptoms can include pain or discomfort in one or both arms, the back, neck, jaw, or stomach.  Shortness of breath with or without chest discomfort.  Other signs may include breaking out in a cold sweat, nausea, or lightheadedness. Don't wait more than five minutes to call 911 - MINUTES MATTER! Fast action can save your life. Calling 911 is almost always the fastest way to get lifesaving treatment. Emergency Medical Services staff can begin treatment when they arrive -- up to an hour sooner than if someone gets to the hospital by car. The discharge information has been reviewed with the patient and caregiver. Any questions and concerns from the patient and caregiver have been addressed. The patient and caregiver verbalized understanding.         Other information in your discharge envelope:  [x]     PRESCRIPTIONS  []     PHYSICAL THERAPY PRESCRIPTION  []     APPOINTMENT CARDS  []     Regional Anesthesia Pamphlet for block or block with On-Q Catheter from   Anesthesia Service  []     Medical device information sheets/pamphlets from their    []     School/work excuse note. []     /parent work excuse note. The following personal items collected during your admission are returned to you:   Dental Appliance: Dental Appliances: None  Vision: Visual Aid: None  Hearing Aid:    Jewelry: Jewelry: Body Piercing  Clothing: Clothing: Footwear, Pants, Shirt, Undergarments, Socks  Other Valuables:  Other Valuables: None  Valuables sent to safe:

## 2018-01-16 NOTE — IP AVS SNAPSHOT
Andrew Tran 
 
 
 [address] 
[phone] Patient: Loren Artis MRN: [id number] [facility]:9/35/7859 About your hospitalization You were admitted on:  January 16, 2018 You last received care in the:  [facility] [place] PACU You were discharged on:  January 16, 2018 Why you were hospitalized Your primary diagnosis was:  Not on File Follow-up Information Follow up With Details Comments Contact Info Ashlee Elena MD   [place] [address] [address] [facility] [address] [place] [address] 
[phone] Your Scheduled Appointments Wednesday February 28, 2018 11:55 AM EST  
POST OP with Floresita Mulligan., MD  
[address] at [facility]) [address] [address] [address]  
[phone] Discharge Orders None A check jean indicates which time of day the medication should be taken. My Medications START taking these medications Instructions Each Dose to Equal  
 Morning Noon Evening Bedtime HYDROcodone-acetaminophen 7.5-325 mg per tablet Commonly known as:  Zilphia Deck Your last dose was: Your next dose is: Take 1 Tab by mouth every four (4) hours as needed for Pain. Max Daily Amount: 6 Tabs. 1 Tab CHANGE how you take these medications Instructions Each Dose to Equal  
 Morning Noon Evening Bedtime  
 desoximetasone 0.05 % topical cream  
Commonly known as:  TOPICORT What changed:   
- when to take this 
- reasons to take this Your last dose was: Your next dose is:    
   
   
 Apply  to affected area two (2) times a day. CONTINUE taking these medications Instructions Each Dose to Equal  
 Morning Noon Evening Bedtime ALPRAZolam 0.25 mg tablet Commonly known as:  Kylee [place] Your last dose was: Your next dose is: Take 1 Tab by mouth daily as needed for Anxiety or Sleep. Indications: anxiety 0.25 mg  
    
   
   
   
  
 clindamycin 1 % external solution Commonly known as:  CLEOCIN T Your last dose was: Your next dose is:    
   
   
 use thin film on affected area  
     
   
   
   
  
 hydroCHLOROthiazide 25 mg tablet Commonly known as:  HYDRODIURIL Your last dose was: Your next dose is: Take 1 Tab by mouth daily. 25 mg  
    
   
   
   
  
 pantoprazole 40 mg tablet Commonly known as:  PROTONIX Your last dose was: Your next dose is: Take 1 Tab by mouth daily. 40 mg  
    
   
   
   
  
 pravastatin 20 mg tablet Commonly known as:  PRAVACHOL Your last dose was: Your next dose is: Take 1 Tab by mouth nightly. 20 mg  
    
   
   
   
  
 spironolactone 100 mg tablet Commonly known as:  ALDACTONE Your last dose was: Your next dose is: Take 1 Tab by mouth daily. DO NOT TAKE WITH POTASSIUM  
 100 mg  
    
   
   
   
  
 testosterone cypionate 200 mg/mL injection Commonly known as:  DEPOTESTOTERONE CYPIONATE Your last dose was: Your next dose is:    
   
   
 1 mL by IntraMUSCular route every fourteen (14) days. Max Daily Amount: 200 mg. Please dispense 10 ml bottle  
 200 mg  
    
   
   
   
  
 TRUVADA 200-300 mg per tablet Generic drug:  emtricitabine-tenofovir (TDF) Your last dose was: Your next dose is: Take 1 Tab by mouth daily. 1 Tab Where to Get Your Medications Information on where to get these meds will be given to you by the nurse or doctor. ! Ask your nurse or doctor about these medications HYDROcodone-acetaminophen 7.5-325 mg per tablet Discharge Instructions Discharge Instructions from Dr. Pedro Saldivar · I will call you with the pathology results, typically within 1 week from today. · You may shower, but no hot tubs, swimming pools, or baths until your incision is healed. · No heavy lifting with the affected extremity (nothing greater than 5 pounds), and limit its use for the next 4-5 days. · You may use an ice pack for comfort for the next couple of days, but do not place ice directly on the skin. Rather, use a towel or clothing to serve as a barrier between skin and ice to prevent injury. · If I placed a drain, follow the drain instructions provided, especially as you keep a record of the drain output. · Follow medication instructions carefully. · Watch for signs of infection as listed below. · Redness · Swelling · Drainage from the incision or from your nipple that appears infected · Fever over 101 degrees for consecutive readings, or over 99.5 if you are currently undergoing chemotherapy. · Call our office (number is below) for a follow-up appointment. · If you have any problems, our phone number is [phone]. DISCHARGE SUMMARY from your Nurse PATIENT INSTRUCTIONS After general anesthesia or intravenous sedation, for 24 hours or while taking prescription Narcotics: · Limit your activities · Do not drive and operate hazardous machinery · Do not make important personal or business decisions · Do  not drink alcoholic beverages · If you have not urinated within 8 hours after discharge, please contact your surgeon on call. Report the following to your surgeon: 
· Excessive pain, swelling, redness or odor of or around the surgical area · Temperature over 100.5 · Nausea and vomiting lasting longer than 4 hours or if unable to take medications · Any signs of decreased circulation or nerve impairment to extremity: change in color, persistent  numbness, tingling, coldness or increase pain · Any questions [address] Breathing deeply and coughing are very important exercises to do after surgery. Deep breathing and coughing open the little air tubes and air sacks in your lungs. You take deep breaths every day. You may not even notice - it is just something you do when you sigh or yawn. It is a natural exercise you do to keep these air passages open. After surgery, take deep breaths and cough, on purpose. DIRECTIONS: 
· Take 10 to 15 slow deep breaths every hour while awake. · Breathe in deeply, and hold it for 2 seconds. · Exhale slowly through puckered lips, like blowing up a balloon. · After every 4th or 5th deep breath, hug your pillow to your chest or belly and give a hard, deep cough. Yes, it will probably hurt. But doing this exercise is a very important part of healing after surgery. Take your pain medicine to help you do this exercise without too much pain. Coughing and deep breathing help prevent bronchitis and pneumonia after surgery. If you had chest or belly surgery, use a pillow as a \"hug verona\" and hold it tightly to your chest or belly when you cough. ANKLE PUMPS Ankle pumps increase the circulation of oxygenated blood to your lower extremities and decrease your risk for circulation problems such as blood clots. They also stretch the muscles, tendons and ligaments in your foot and ankle, and prevent joint contracture in the ankle and foot, especially after surgeries on the legs. It is important to do ankle pump exercises regularly after surgery because immobility increases your risk for developing a blood clot. Your doctor may also have you take an Aspirin for the next few days as well. If your doctor did not ask you to take an Aspirin, consult with him before starting Aspirin therapy on your own. The exercise is quite simple. · Slowly point your foot forward, feeling the muscles on the top of your lower leg stretch, and hold this position for 5 seconds. · Next, pull your foot back toward you as far as possible, stretching the calf muscles, and hold that position for 5 seconds. · Repeat with the other foot. · Perform 10 repetitions every hour while awake for both ankles if possible (down and then up with the foot once is one repetition). You should feel gentle stretching of the muscles in your lower leg when doing this exercise. If you feel pain, or your range of motion is limited, don't push too hard. Only go the limit your joint and muscles will let you go. If you have increasing pain, progressively worsening leg warmth or swelling, STOP the exercise and call your doctor. MEDICATION AND  
SIDE EFFECT GUIDE [facility] [place] EFFECT GUIDE was provided to the PATIENT AND CARE PROVIDER. Information provided includes instruction about drug purpose and common side effects for the following medications: · Hydrocodone-acetaminophen These are general instructions for a healthy lifestyle: *   Please give a list of your current medications to your Primary Care Provider. *   Please update this list whenever your medications are discontinued, doses are changed, or new medications (including over-the-counter products) are added. *   Please carry medication information at all times in case of emergency situations. About Smoking No smoking / No tobacco products Avoid exposure to second hand smoke Surgeon General's Warning:  Quitting smoking now greatly reduces serious risk to your health. Obesity, smoking, and sedentary lifestyle greatly increases your risk for illness and disease. A healthy diet, regular physical exercise & weight monitoring are important for maintaining a healthy lifestyle. Congestive Heart Failure You may be retaining fluid if you have a history of heart failure or if you experience any of the following symptoms:  Weight gain of 3 pounds or more overnight or 5 pounds in a week, increased swelling in your hands or feet or shortness of breath while lying flat in bed. Please call your doctor as soon as you notice any of these symptoms; do not wait until your next office visit. Recognize signs and symptoms of STROKE: 
F -  Face looks uneven A -  Arms unable to move or move evenly S -  Speech slurred or non-existent T -  Time-call 911 as soon as signs and symptoms begin-DO NOT go  
       back to bed or wait to see if you get better-TIME IS BRAIN. Warning Signs of HEART ATTACK Call 911 if you have these symptoms: 
 
? Chest discomfort. Most heart attacks involve discomfort in the center of the chest that lasts more than a few minutes, or that goes away and comes back. It can feel like uncomfortable pressure, squeezing, fullness, or pain. ? Discomfort in other areas of the upper body. Symptoms can include pain or discomfort in one or both arms, the back, neck, jaw, or stomach. ? Shortness of breath with or without chest discomfort. ? Other signs may include breaking out in a cold sweat, nausea, or lightheadedness. Don't wait more than five minutes to call [address]! Fast action can save your life. Calling 911 is almost always the fastest way to get lifesaving treatment. Emergency Medical Services staff can begin treatment when they arrive  up to an hour sooner than if someone gets to the hospital by car. The discharge information has been reviewed with the patient and caregiver. Any questions and concerns from the patient and caregiver have been addressed. The patient and caregiver verbalized understanding. Other information in your discharge envelope: PRESCRIPTIONS PHYSICAL THERAPY PRESCRIPTION 
     APPOINTMENT CARDS [facility] Pamphlet for block or block with On-Q Catheter from   Anesthesia Service Medical device information sheets/pamphlets from their  School/work excuse note. /parent work excuse note. The following personal items collected during your admission are returned to you:  
Dental Appliance: Dental Appliances: None Vision: Visual Aid: None Hearing Aid:   
Jewelry: Jewelry: Body Piercing Clothing: Clothing: Footwear, Pants, Shirt, Undergarments, Socks Other Valuables: Other Valuables: None Valuables sent to safe:   
 
  
  
  
Introducing [facility]! Dear Severiano Vazquez: Thank you for requesting a [employer] account. Our records indicate that you already have an active [employer] account. You can access your account anytime at [email or web address] Did you know that you can access your hospital and ER discharge instructions at any time in [employer]? You can also review all of your test results from your hospital stay or ER visit. Additional Information If you have questions, please visit the Frequently Asked Questions section of the [employer] website at [email or web address] Remember, [employer] is NOT to be used for urgent needs. For medical emergencies, dial 911. Now available from your iPhone and Android! Providers Seen During Your Hospitalization Provider Specialty Primary office phone Darlin Latham MD Breast Surgery [phone] Your Primary Care Physician (PCP) Primary Care Physician Office Phone Office Fax Hilda Enriquez [phone] You are allergic to the following Allergen Reactions Sulfa (Sulfonamide Antibiotics) Other (comments) Head of penis swelling and burning with urination Recent Documentation Height Weight BMI Smoking Status 1.753 m 86.4 kg 28.13 kg/m2 Former Smoker Emergency Contacts Name Discharge Info Relation Home Work Mobile Shahzad Mcclure DISCHARGE CAREGIVER [3] Life Partner [7] [phone]    
 B,Simona  Other Relative [6] [phone] Patient Belongings The following personal items are in your possession at time of discharge: 
  Dental Appliances: None  Visual Aid: None      Home Medications: None   Jewelry: Body Piercing  Clothing: Footwear, Pants, Shirt, Undergarments, Socks    Other Valuables: None Please provide this summary of care documentation to your next provider. Signatures-by signing, you are acknowledging that this After Visit Summary has been reviewed with you and you have received a copy. Patient Signature:  ____________________________________________________________ Date:  ____________________________________________________________  
  
[facility] Sleeper Provider Signature:  ____________________________________________________________ Date:  ____________________________________________________________

## 2018-01-17 ENCOUNTER — TELEPHONE (OUTPATIENT)
Dept: SURGERY | Age: 53
End: 2018-01-17

## 2018-02-28 ENCOUNTER — OFFICE VISIT (OUTPATIENT)
Dept: SURGERY | Age: 53
End: 2018-02-28

## 2018-02-28 VITALS
WEIGHT: 190 LBS | HEART RATE: 66 BPM | HEIGHT: 69 IN | BODY MASS INDEX: 28.14 KG/M2 | SYSTOLIC BLOOD PRESSURE: 126 MMHG | DIASTOLIC BLOOD PRESSURE: 75 MMHG

## 2018-02-28 DIAGNOSIS — N60.82 SEBACEOUS CYST OF BREAST, LEFT: Primary | ICD-10-CM

## 2018-02-28 RX ORDER — AMOXICILLIN AND CLAVULANATE POTASSIUM 875; 125 MG/1; MG/1
1 TABLET, FILM COATED ORAL 2 TIMES DAILY
Qty: 20 TAB | Refills: 1 | Status: SHIPPED | OUTPATIENT
Start: 2018-02-28 | End: 2018-05-25 | Stop reason: ALTCHOICE

## 2018-02-28 NOTE — PROGRESS NOTES
HISTORY OF PRESENT ILLNESS  Mk Champion is a 47 y.o. male. HPI  ESTABLISHED patient here today for post op follow up LEFT breast nipple excision of sebaceous cyst. He reports when he squeezes the incisional area he is able to expel a small amount of pus. He describes the area that remains open is the incisional area that is under the nipple. The nipple area still feels firm and he is concerned there is still pus under it. The incision is closing up but the small area that is still open is where the patient can squeeze the drainage. The patient is S/P LEFT nipple excision of sebaceous cyst 1/16/2018      FINAL PATHOLOGIC DIAGNOSIS   Left breast cyst, excision:   Epidermal inclusion cyst.     Past Medical History:   Diagnosis Date    Anxiety     Borderline hypertension     Cystic acne     Diverticulitis of colon 7/2014    Eczema     Dr. Barry Vega GERD (gastroesophageal reflux disease)     Hyperlipidemia     10/2014 chol 198, ldl 140, hdl 43    Insomnia     Male hypogonadism 2012    Dr. Melissa Patrick. psa 0.8 6/2014, psa 1.0    Skin abscess     MSSA 7/2014    Tubular adenoma 11/2013    Vitamin B12 deficiency     Vitamin D deficiency        Past Surgical History:   Procedure Laterality Date    BREAST SURGERY PROCEDURE UNLISTED Left 12/2017    I&D left breast cyst    HX BREAST BIOPSY Left 1/16/2018    LEFT BREAST NIPPLE EXCISION OF SEBACEOUS CYST performed by [facility]ia [facility]aur [facility] at [facility]  2005    HX COLONOSCOPY  11/7/13    Dr. Blaine Carpio.  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) [employer]     Social History Main Topics    Smoking status: Former Smoker     Packs/day: 1.00     Years: 20.00     Quit date: 1/15/2007    Smokeless tobacco: Former User     Quit date: 1/1/2007      Comment: quit smoking 2 different times    Alcohol use Yes      Comment: once every 3-6 months beer    Drug use: Yes     Special: Marijuana      Comment: last smoked August 2017    Sexual activity: Yes     Other Topics Concern    Not on file     Social History Narrative    3 kids, 1 grandson (Kita)       Current Outpatient Prescriptions on File Prior to Visit   Medication Sig Dispense Refill    HYDROcodone-acetaminophen (NORCO) 7.5-325 mg per tablet Take 1 Tab by mouth every four (4) hours as needed for Pain. Max Daily Amount: 6 Tabs. 25 Tab 0    spironolactone (ALDACTONE) 100 mg tablet Take 1 Tab by mouth daily. DO NOT TAKE WITH POTASSIUM 90 Tab 1    pantoprazole (PROTONIX) 40 mg tablet Take 1 Tab by mouth daily. 90 Tab 1    ALPRAZolam (XANAX) 0.25 mg tablet Take 1 Tab by mouth daily as needed for Anxiety or Sleep. Indications: anxiety 90 Tab 1    pravastatin (PRAVACHOL) 20 mg tablet Take 1 Tab by mouth nightly. 90 Tab 1    testosterone cypionate (DEPOTESTOTERONE CYPIONATE) 200 mg/mL injection 1 mL by IntraMUSCular route every fourteen (14) days. Max Daily Amount: 200 mg. Please dispense 10 ml bottle 10 mL 1    hydroCHLOROthiazide (HYDRODIURIL) 25 mg tablet Take 1 Tab by mouth daily. 90 Tab 1    clindamycin (CLEOCIN T) 1 % external solution use thin film on affected area 60 mL 3    TRUVADA 200-300 mg per tablet Take 1 Tab by mouth daily. 0    desoximetasone (TOPICORT) 0.05 % topical cream Apply  to affected area two (2) times a day. (Patient taking differently: Apply  to affected area two (2) times daily as needed.) 30 g 3     No current facility-administered medications on file prior to visit.         Allergies   Allergen Reactions    Sulfa (Sulfonamide Antibiotics) Other (comments)     Head of penis swelling and burning with urination           ROS    Physical Exam   Cardiovascular: Normal rate, regular rhythm and normal heart sounds. Pulmonary/Chest: Effort normal and breath sounds normal. Right breast exhibits no inverted nipple, no mass, no nipple discharge, no skin change and no tenderness. Left breast exhibits no inverted nipple, no mass, no nipple discharge, no skin change and no tenderness. Breasts are symmetrical.           ASSESSMENT and PLAN  Encounter Diagnoses   Name Primary?  Sebaceous cyst of breast, left Yes      Pt presents for follow up s/p left breast nipple excision of sebaceous cyst. Patient reports itching and drainage at incision site. Will rx 7 day course of Augmentin. Follow up in 1 month. This plan was reviewed with the patient and patient agrees. All questions were answered.     Written by Agnes Hall, as dictated by Dr. Herson Mtz MD.

## 2018-02-28 NOTE — PROGRESS NOTES
HISTORY OF PRESENT ILLNESS  Merrill Peck is a 47 y.o. male. HPI ESTABLISHED patient here today for post op follow up LEFT breast nipple excision of sebaceous cyst. He reports when he squeezes the incisional area he is able to expel a small amount of pus. He describes the area that remains open is the incisional area that is under the nipple. The nipple area still feels firm and he is concerned there is still pus under it. The incision is closing up but the small area that is still open is where the patient can squeeze the drainage.   The patient is S/P LEFT nipple excision of sebaceous cyst 1/16/2018     FINAL PATHOLOGIC DIAGNOSIS   Left breast cyst, excision:   Epidermal inclusion cyst.   ROS    Physical Exam    ASSESSMENT and PLAN  {ASSESSMENT/PLAN:86324}

## 2018-02-28 NOTE — COMMUNICATION BODY
HISTORY OF PRESENT ILLNESS  Erica Jimenez is a 47 y.o. male. HPI  ESTABLISHED patient here today for post op follow up LEFT breast nipple excision of sebaceous cyst. He reports when he squeezes the incisional area he is able to expel a small amount of pus. He describes the area that remains open is the incisional area that is under the nipple. The nipple area still feels firm and he is concerned there is still pus under it. The incision is closing up but the small area that is still open is where the patient can squeeze the drainage. The patient is S/P LEFT nipple excision of sebaceous cyst 1/16/2018      FINAL PATHOLOGIC DIAGNOSIS   Left breast cyst, excision:   Epidermal inclusion cyst.     Past Medical History:   Diagnosis Date    Anxiety     Borderline hypertension     Cystic acne     Diverticulitis of colon 7/2014    Eczema     Dr. Juani Schroeder GERD (gastroesophageal reflux disease)     Hyperlipidemia     10/2014 chol 198, ldl 140, hdl 43    Insomnia     Male hypogonadism 2012    Dr. Carito Smith. psa 0.8 6/2014, psa 1.0    Skin abscess     MSSA 7/2014    Tubular adenoma 11/2013    Vitamin B12 deficiency     Vitamin D deficiency        Past Surgical History:   Procedure Laterality Date    BREAST SURGERY PROCEDURE UNLISTED Left 12/2017    I&D left breast cyst    HX BREAST BIOPSY Left 1/16/2018    LEFT BREAST NIPPLE EXCISION OF SEBACEOUS CYST performed by Felicia Galvan [facility] at [facility]  2005    HX COLONOSCOPY  11/7/13    Dr. Rachel Abraham.  several tubular adenomas    [facility]ilateral 2007    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) [employer]     Social History Main Topics    Smoking status: Former Smoker     Packs/day: 1.00     Years: 20.00     Quit date: 1/15/2007    Smokeless tobacco: Former User     Quit date: 1/1/2007      Comment: quit smoking 2 different times    Alcohol use Yes      Comment: once every 3-6 months beer    Drug use: Yes     Special: Marijuana      Comment: last smoked August 2017    Sexual activity: Yes     Other Topics Concern    Not on file     Social History Narrative    3 kids, 1 grandson (Kita)       Current Outpatient Prescriptions on File Prior to Visit   Medication Sig Dispense Refill    HYDROcodone-acetaminophen (NORCO) 7.5-325 mg per tablet Take 1 Tab by mouth every four (4) hours as needed for Pain. Max Daily Amount: 6 Tabs. 25 Tab 0    spironolactone (ALDACTONE) 100 mg tablet Take 1 Tab by mouth daily. DO NOT TAKE WITH POTASSIUM 90 Tab 1    pantoprazole (PROTONIX) 40 mg tablet Take 1 Tab by mouth daily. 90 Tab 1    ALPRAZolam (XANAX) 0.25 mg tablet Take 1 Tab by mouth daily as needed for Anxiety or Sleep. Indications: anxiety 90 Tab 1    pravastatin (PRAVACHOL) 20 mg tablet Take 1 Tab by mouth nightly. 90 Tab 1    testosterone cypionate (DEPOTESTOTERONE CYPIONATE) 200 mg/mL injection 1 mL by IntraMUSCular route every fourteen (14) days. Max Daily Amount: 200 mg. Please dispense 10 ml bottle 10 mL 1    hydroCHLOROthiazide (HYDRODIURIL) 25 mg tablet Take 1 Tab by mouth daily. 90 Tab 1    clindamycin (CLEOCIN T) 1 % external solution use thin film on affected area 60 mL 3    TRUVADA 200-300 mg per tablet Take 1 Tab by mouth daily. 0    desoximetasone (TOPICORT) 0.05 % topical cream Apply  to affected area two (2) times a day. (Patient taking differently: Apply  to affected area two (2) times daily as needed.) 30 g 3     No current facility-administered medications on file prior to visit.         Allergies   Allergen Reactions    Sulfa (Sulfonamide Antibiotics) Other (comments)     Head of penis swelling and burning with urination           ROS    Physical Exam   Cardiovascular: Normal rate, regular rhythm and normal heart sounds. Pulmonary/Chest: Effort normal and breath sounds normal. Right breast exhibits no inverted nipple, no mass, no nipple discharge, no skin change and no tenderness. Left breast exhibits no inverted nipple, no mass, no nipple discharge, no skin change and no tenderness. Breasts are symmetrical.           ASSESSMENT and PLAN  Encounter Diagnoses   Name Primary?  Sebaceous cyst of breast, left Yes      Pt presents for follow up s/p left breast nipple excision of sebaceous cyst. Patient reports itching and drainage at incision site. Will rx 7 day course of Augmentin. Follow up in 1 month. This plan was reviewed with the patient and patient agrees. All questions were answered.     Written by Olga Santos, as dictated by Dr. Jewels Jiménez MD.

## 2018-02-28 NOTE — PATIENT INSTRUCTIONS
Acne: Care Instructions  Your Care Instructions  Acne is a skin problem that shows up as blackheads, whiteheads, and pimples. It most often affects the face, neck, and upper body. Acne occurs when oil and dead skin cells clog the skin's pores. Acne usually starts during the teen years and often lasts into adulthood. Gentle cleansing every day controls most mild acne. If home treatment does not work, your doctor may prescribe creams, antibiotics, or a stronger medicine called isotretinoin. Sometimes birth control pills help women who have monthly acne flare-ups. Follow-up care is a key part of your treatment and safety. Be sure to make and go to all appointments, and call your doctor if you are having problems. It's also a good idea to know your test results and keep a list of the medicines you take. How can you care for yourself at home? · Gently wash your face 1 or 2 times a day with warm (not hot) water and a mild soap or cleanser. Always rinse well. · Use an over-the-counter lotion or gel that contains benzoyl peroxide. Start with a small amount of 2.5% benzoyl peroxide and increase the strength as needed. Benzoyl peroxide works well for acne, but you may need to use it for up to 2 months before your acne starts to improve. · Apply acne cream, lotion, or gel to all the places you get pimples, blackheads, or whiteheads, not just where you have them now. Follow the instructions carefully. If your skin gets too dry and scaly or red and sore, reduce the amount. For the best results, apply medicines as directed. Try not to miss doses. · Do not squeeze or pick pimples and blackheads. This can cause infection and scarring. · Use only oil-free makeup, sunscreen, and other skin care products that will not clog your pores. · Wash your hair every day, and try to keep it off your face and shoulders. Consider pinning it back or cutting it short. When should you call for help?   Watch closely for changes in your health, and be sure to contact your doctor if:  ? · You have tried home treatment for 6 to 8 weeks and your acne is not better or gets worse. Your doctor may need to add to or change your treatment. ? · Your pimples become large and hard or filled with fluid. ? · Scars form after pimples heal.   ? · You feel sad or hopeless, lack energy, or have other signs of depression while you are taking the prescription medicine isotretinoin. ? · You start to have other symptoms, such as facial hair growth in women or bone and muscle pain. Where can you learn more? Go to http://cleve-beverly.info/. Enter V108 in the search box to learn more about \"Acne: Care Instructions. \"  Current as of: October 13, 2016  Content Version: 11.4  © 0737-2772 [employer]. Care instructions adapted under license by [employer] (which disclaims liability or warranty for this information). If you have questions about a medical condition or this instruction, always ask your healthcare professional. [place] [address] any warranty or liability for your use of this information.

## 2018-03-01 DIAGNOSIS — R03.0 BORDERLINE HYPERTENSION: ICD-10-CM

## 2018-03-01 RX ORDER — HYDROCHLOROTHIAZIDE 25 MG/1
25 TABLET ORAL DAILY
Qty: 90 TAB | Refills: 0 | Status: SHIPPED | OUTPATIENT
Start: 2018-03-01 | End: 2018-05-25 | Stop reason: SDUPTHER

## 2018-05-25 ENCOUNTER — OFFICE VISIT (OUTPATIENT)
Dept: INTERNAL MEDICINE CLINIC | Age: 53
End: 2018-05-25

## 2018-05-25 VITALS
WEIGHT: 198 LBS | HEART RATE: 82 BPM | RESPIRATION RATE: 18 BRPM | BODY MASS INDEX: 29.33 KG/M2 | TEMPERATURE: 99.2 F | DIASTOLIC BLOOD PRESSURE: 84 MMHG | HEIGHT: 69 IN | SYSTOLIC BLOOD PRESSURE: 126 MMHG | OXYGEN SATURATION: 95 %

## 2018-05-25 DIAGNOSIS — N52.9 ERECTILE DYSFUNCTION, UNSPECIFIED ERECTILE DYSFUNCTION TYPE: ICD-10-CM

## 2018-05-25 DIAGNOSIS — E87.6 HYPOKALEMIA: ICD-10-CM

## 2018-05-25 DIAGNOSIS — F41.9 ANXIETY: ICD-10-CM

## 2018-05-25 DIAGNOSIS — R03.0 BORDERLINE HYPERTENSION: ICD-10-CM

## 2018-05-25 DIAGNOSIS — E29.1 MALE HYPOGONADISM: Primary | ICD-10-CM

## 2018-05-25 DIAGNOSIS — E78.2 MIXED HYPERLIPIDEMIA: ICD-10-CM

## 2018-05-25 DIAGNOSIS — N64.4 BREAST PAIN: ICD-10-CM

## 2018-05-25 DIAGNOSIS — K21.00 GASTROESOPHAGEAL REFLUX DISEASE WITH ESOPHAGITIS: ICD-10-CM

## 2018-05-25 RX ORDER — SILDENAFIL CITRATE 20 MG/1
20 TABLET ORAL
COMMUNITY
End: 2018-05-25 | Stop reason: SDUPTHER

## 2018-05-25 RX ORDER — PANTOPRAZOLE SODIUM 40 MG/1
40 TABLET, DELAYED RELEASE ORAL DAILY
Qty: 90 TAB | Refills: 1 | Status: SHIPPED | OUTPATIENT
Start: 2018-05-25 | End: 2018-12-14 | Stop reason: SDUPTHER

## 2018-05-25 RX ORDER — TESTOSTERONE CYPIONATE 200 MG/ML
200 INJECTION INTRAMUSCULAR
Qty: 10 ML | Refills: 5 | Status: SHIPPED | OUTPATIENT
Start: 2018-05-25 | End: 2018-12-14 | Stop reason: SDUPTHER

## 2018-05-25 RX ORDER — OXYCODONE AND ACETAMINOPHEN 10; 325 MG/1; MG/1
1 TABLET ORAL
Qty: 1 TAB | Refills: 0 | Status: SHIPPED | OUTPATIENT
Start: 2018-05-25 | End: 2018-12-14 | Stop reason: ALTCHOICE

## 2018-05-25 RX ORDER — HYDROCHLOROTHIAZIDE 25 MG/1
25 TABLET ORAL DAILY
Qty: 90 TAB | Refills: 1 | Status: SHIPPED | OUTPATIENT
Start: 2018-05-25 | End: 2018-11-21 | Stop reason: SDUPTHER

## 2018-05-25 RX ORDER — SPIRONOLACTONE 100 MG/1
100 TABLET, FILM COATED ORAL DAILY
Qty: 90 TAB | Refills: 1 | Status: SHIPPED | OUTPATIENT
Start: 2018-05-25 | End: 2018-12-14 | Stop reason: SDUPTHER

## 2018-05-25 RX ORDER — PRAVASTATIN SODIUM 20 MG/1
20 TABLET ORAL
Qty: 90 TAB | Refills: 1 | Status: SHIPPED | OUTPATIENT
Start: 2018-05-25 | End: 2018-12-14 | Stop reason: SDUPTHER

## 2018-05-25 RX ORDER — ALPRAZOLAM 0.25 MG/1
0.25 TABLET ORAL
Qty: 90 TAB | Refills: 0 | Status: SHIPPED | OUTPATIENT
Start: 2018-05-25 | End: 2018-12-14 | Stop reason: SDUPTHER

## 2018-05-25 RX ORDER — SILDENAFIL CITRATE 20 MG/1
20 TABLET ORAL
Qty: 90 TAB | Refills: 5 | Status: SHIPPED | OUTPATIENT
Start: 2018-05-25 | End: 2018-12-14 | Stop reason: ALTCHOICE

## 2018-05-25 NOTE — PROGRESS NOTES
HISTORY OF PRESENT ILLNESS    Chief Complaint   Patient presents with    Medication Refill       Presents for follow-up  He has 3 scalp cysts he would like lanced, but he traveling to [place] to party [employer] and does not want it dont today. Seeing ID for PREP HIV    Labs done per ID Dr. Meir Mondragon 5/17/18. Cr 1.38, glucose 100. K 4.4  HIV neg. GC /Chl neg    Hypertension  Hypertension ROS: taking medications as instructed, no medication side effects noted, no TIA's, no chest pain on exertion, no dyspnea on exertion, no swelling of ankles     reports that he quit smoking about 11 years ago. He has a 20.00 pack-year smoking history. He quit smokeless tobacco use about 11 years ago. reports that he drinks alcohol. BP Readings from Last 2 Encounters:   05/25/18 126/84   02/28/18 126/75       Hyperlipidemia  Currently he takes pravastatin 20 mg  ROS: taking medications as instructed, no medication side effects noted  No new myalgias, no joint pains, no weakness  No TIA's, no chest pain on exertion, no dyspnea on exertion, no swelling of ankles. Lab Results   Component Value Date/Time    Cholesterol, total 164 05/06/2016 10:43 AM    HDL Cholesterol 40 05/06/2016 10:43 AM    LDL, calculated 101 (H) 05/06/2016 10:43 AM    VLDL, calculated 23 05/06/2016 10:43 AM    Triglyceride 116 05/06/2016 10:43 AM       Review of Systems   All other systems reviewed and are negative, except as noted in HPI    Past Medical and Surgical History   has a past medical history of Anxiety; Borderline hypertension; Cystic acne; Diverticulitis of colon (7/2014); Eczema; GERD (gastroesophageal reflux disease); Hyperlipidemia; Insomnia; Male hypogonadism (2012); Skin abscess; Tubular adenoma (11/2013); Vitamin B12 deficiency; and Vitamin D deficiency.    has a past surgical history that includes hx cholecystectomy (2005); hx colonoscopy (11/7/13); pr breast surgery procedure unlisted (Left, 12/2017); hx heent (Bilateral, 2007); and hx breast biopsy (Left, 1/16/2018). reports that he quit smoking about 11 years ago. He has a 20.00 pack-year smoking history. He quit smokeless tobacco use about 11 years ago. He reports that he drinks alcohol. He reports that he uses illicit drugs, including Marijuana. family history includes Cancer in his mother; Hypertension in his mother and sister. Physical Exam   Nursing note and vitals reviewed. Blood pressure 126/84, pulse 82, temperature 99.2 °F (37.3 °C), temperature source Oral, resp. rate 18, height 5' 9\" (1.753 m), weight 198 lb (89.8 kg), SpO2 95 %. Constitutional:  No distress. Eyes: Conjunctivae are normal.   Ears:  Hearing grossly intact  Cardiovascular: Normal rate. regular rhythm, no murmurs or gallops  No edema  Pulmonary/Chest: Effort normal.   CTAB  Musculoskeletal: moves all 4 extremities   Neurological: Alert and oriented to person, place, and time. Skin: No rash noted. Psychiatric: Normal mood and affect. Behavior is normal.     ASSESSMENT and PLAN  Diagnoses and all orders for this visit:    1. Male hypogonadism  -     testosterone cypionate (DEPOTESTOTERONE CYPIONATE) 200 mg/mL injection; 1 mL by IntraMUSCular route every fourteen (14) days. Max Daily Amount: 200 mg. Please dispense 10 ml bottle    2. Borderline hypertension - Controlled on current regimen. Continue current medications as written in chart.  -     hydroCHLOROthiazide (HYDRODIURIL) 25 mg tablet; Take 1 Tab by mouth daily. Indications: Edema    3. Hypokalemia - normal on recent labs. Cont spironolactone  -     spironolactone (ALDACTONE) 100 mg tablet; Take 1 Tab by mouth daily. DO NOT TAKE WITH POTASSIUM    4. Gastroesophageal reflux disease with esophagitis  -     pantoprazole (PROTONIX) 40 mg tablet; Take 1 Tab by mouth daily. 5. Anxiety - Controlled on current regimen. Continue current medications as written in chart   profile was accessed online for [facility] and reviewed by me during this encounter. I did see evidence of inappropriate or suspicious controlled substance prescription activity.  -     ALPRAZolam (XANAX) 0.25 mg tablet; Take 1 Tab by mouth daily as needed for Anxiety or Sleep. Indications: anxiety    6. Mixed hyperlipidemia - Controlled on current regimen. Continue current medications as written in chart. -     pravastatin (PRAVACHOL) 20 mg tablet; Take 1 Tab by mouth nightly. 7. Erectile dysfunction, unspecified erectile dysfunction type  -     sildenafil, antihypertensive, (REVATIO) 20 mg tablet; Take 1 Tab by mouth daily as needed. 8. Breast pain - he is having a piercing soon and would like 1 pill to help prevent the pain. Do not take with xanax or etoh  -     oxyCODONE-acetaminophen (PERCOCET 10)  mg per tablet; Take 1 Tab by mouth every eight (8) hours as needed for Pain. Max Daily Amount: 3 Tabs. For pain from piercing    lab results and schedule of future lab studies reviewed with patient  reviewed medications and side effects in detail  Return to clinic for further evaluation if new symptoms develop    Current Outpatient Prescriptions   Medication Sig    sildenafil, antihypertensive, (REVATIO) 20 mg tablet Take 1 Tab by mouth daily as needed.  oxyCODONE-acetaminophen (PERCOCET 10)  mg per tablet Take 1 Tab by mouth every eight (8) hours as needed for Pain. Max Daily Amount: 3 Tabs. For pain from piercing    hydroCHLOROthiazide (HYDRODIURIL) 25 mg tablet Take 1 Tab by mouth daily. Indications: Edema    spironolactone (ALDACTONE) 100 mg tablet Take 1 Tab by mouth daily. DO NOT TAKE WITH POTASSIUM    pantoprazole (PROTONIX) 40 mg tablet Take 1 Tab by mouth daily.  ALPRAZolam (XANAX) 0.25 mg tablet Take 1 Tab by mouth daily as needed for Anxiety or Sleep. Indications: anxiety    pravastatin (PRAVACHOL) 20 mg tablet Take 1 Tab by mouth nightly.     testosterone cypionate (DEPOTESTOTERONE CYPIONATE) 200 mg/mL injection 1 mL by IntraMUSCular route every fourteen (14) days. Max Daily Amount: 200 mg. Please dispense 10 ml bottle    clindamycin (CLEOCIN T) 1 % external solution use thin film on affected area    TRUVADA 200-300 mg per tablet Take 1 Tab by mouth daily. No current facility-administered medications for this visit.

## 2018-05-25 NOTE — MR AVS SNAPSHOT
[facility] 
 
 
 [address] [facility] [address] [place] [address] 
[phone] Patient: Elana Solano MRN: [id number] AHT:[id number] Visit Information Date & Time Provider Department Dept. Phone Encounter #  
 5/25/2018  3:45 PM Terrance Valverde MD [facility] [address] [facility] [id number] Upcoming Health Maintenance Date Due DTaP/Tdap/Td series (1 - Tdap) 6/15/1985 COLONOSCOPY 11/7/2018 Influenza Age 5 to Adult 8/1/2018 Allergies as of 5/25/2018  Review Complete On: 5/25/2018 By: Terrance Valverde MD  
  
 Severity Noted Reaction Type Reactions Sulfa (Sulfonamide Antibiotics)  03/06/2015    Other (comments) Head of penis swelling and burning with urination Current Immunizations  Never Reviewed No immunizations on file. Not reviewed this visit You Were Diagnosed With   
  
 Codes Comments Male hypogonadism    -  Primary ICD-10-CM: E29.1 ICD-9-CM: 257.2 Sebaceous cyst of skin of breast, left     ICD-10-CM: P65.35 ICD-9-CM: 610.8 Borderline hypertension     ICD-10-CM: R03.0 ICD-9-CM: 796.2 Hypokalemia     ICD-10-CM: E87.6 ICD-9-CM: 276.8 Gastroesophageal reflux disease with esophagitis     ICD-10-CM: K21.0 ICD-9-CM: 530.11 Anxiety     ICD-10-CM: F41.9 ICD-9-CM: 300.00 Mixed hyperlipidemia     ICD-10-CM: E78.2 ICD-9-CM: 272.2 Erectile dysfunction, unspecified erectile dysfunction type     ICD-10-CM: N52.9 ICD-9-CM: 607.84 Breast pain     ICD-10-CM: N64.4 ICD-9-CM: 611.71 Vitals BP Pulse Temp Resp Height(growth percentile) Weight(growth percentile) 126/84 (BP 1 Location: Left arm, BP Patient Position: Sitting) 82 99.2 °F (37.3 °C) (Oral) 18 5' 9\" (1.753 m) 198 lb (89.8 kg) SpO2 BMI Smoking Status 95% 29.24 kg/m2 Former Smoker Vitals History BMI and BSA Data  Body Mass Index Body Surface Area  
 29.24 kg/m 2 2.09 m 2  
  
  
 Preferred Pharmacy Pharmacy Name Phone [facility] [address] [place] [address] [phone] Your Updated Medication List  
  
   
This list is accurate as of 5/25/18  4:49 PM.  Always use your most recent med list.  
  
  
  
  
 ALPRAZolam 0.25 mg tablet Commonly known as:  Donata Carton Take 1 Tab by mouth daily as needed for Anxiety or Sleep. Indications: anxiety  
  
 clindamycin 1 % external solution Commonly known as:  CLEOCIN T  
use thin film on affected area  
  
 hydroCHLOROthiazide 25 mg tablet Commonly known as:  HYDRODIURIL Take 1 Tab by mouth daily. Indications: Edema  
  
 oxyCODONE-acetaminophen  mg per tablet Commonly known as:  PERCOCET 10 Take 1 Tab by mouth every eight (8) hours as needed for Pain. Max Daily Amount: 3 Tabs. For pain from piercing  
  
 pantoprazole 40 mg tablet Commonly known as:  PROTONIX Take 1 Tab by mouth daily. pravastatin 20 mg tablet Commonly known as:  PRAVACHOL Take 1 Tab by mouth nightly. sildenafil (antihypertensive) 20 mg tablet Commonly known as:  REVATIO Take 1 Tab by mouth daily as needed. spironolactone 100 mg tablet Commonly known as:  ALDACTONE Take 1 Tab by mouth daily. DO NOT TAKE WITH POTASSIUM  
  
 testosterone cypionate 200 mg/mL injection Commonly known as:  DEPOTESTOTERONE CYPIONATE 1 mL by IntraMUSCular route every fourteen (14) days. Max Daily Amount: 200 mg. Please dispense 10 ml bottle TRUVADA 200-300 mg per tablet Generic drug:  emtricitabine-tenofovir (TDF) Take 1 Tab by mouth daily. Prescriptions Printed Refills  
 sildenafil, antihypertensive, (REVATIO) 20 mg tablet 5 Sig: Take 1 Tab by mouth daily as needed. Class: Print Route: Oral  
 oxyCODONE-acetaminophen (PERCOCET 10)  mg per tablet 0 Sig: Take 1 Tab by mouth every eight (8) hours as needed for Pain. Max Daily Amount: 3 Tabs. For pain from piercing Class: Print Route: Oral  
 spironolactone (ALDACTONE) 100 mg tablet 1 Sig: Take 1 Tab by mouth daily. DO NOT TAKE WITH POTASSIUM Class: Print Route: Oral  
 pantoprazole (PROTONIX) 40 mg tablet 1 Sig: Take 1 Tab by mouth daily. Class: Print Route: Oral  
 ALPRAZolam (XANAX) 0.25 mg tablet 0 Sig: Take 1 Tab by mouth daily as needed for Anxiety or Sleep. Indications: anxiety Class: Print Route: Oral  
 pravastatin (PRAVACHOL) 20 mg tablet 1 Sig: Take 1 Tab by mouth nightly. Class: Print Route: Oral  
 testosterone cypionate (DEPOTESTOTERONE CYPIONATE) 200 mg/mL injection 5 Si mL by IntraMUSCular route every fourteen (14) days. Max Daily Amount: 200 mg. Please dispense 10 ml bottle Class: Print Route: IntraMUSCular Prescriptions Sent to Pharmacy Refills  
 hydroCHLOROthiazide (HYDRODIURIL) 25 mg tablet 1 Sig: Take 1 Tab by mouth daily. Indications: Edema Class: Normal  
 [facility] [address] [facility] [address] [facility] Ph #: [phone] Route: Oral  
  
Introducing [facility]! Dear Russel Littlejohn: Thank you for requesting a [employer] account. Our records indicate that you already have an active [employer] account. You can access your account anytime at [email or web address] Did you know that you can access your hospital and ER discharge instructions at any time in [employer]? You can also review all of your test results from your hospital stay or ER visit. Additional Information If you have questions, please visit the Frequently Asked Questions section of the [employer] website at [email or web address] Remember, [employer] is NOT to be used for urgent needs. For medical emergencies, dial 911. Now available from your iPhone and Android! Please provide this summary of care documentation to your next provider. Your primary care clinician is listed as Audelia Root. If you have any questions after today's visit, please call [phone].

## 2018-06-21 DIAGNOSIS — E87.6 HYPOKALEMIA: ICD-10-CM

## 2018-06-21 RX ORDER — SPIRONOLACTONE 100 MG/1
TABLET, FILM COATED ORAL
Qty: 90 TAB | Refills: 1 | Status: SHIPPED | OUTPATIENT
Start: 2018-06-21 | End: 2018-12-14 | Stop reason: SDUPTHER

## 2018-11-21 DIAGNOSIS — R03.0 BORDERLINE HYPERTENSION: ICD-10-CM

## 2018-11-21 RX ORDER — HYDROCHLOROTHIAZIDE 25 MG/1
TABLET ORAL
Qty: 90 TAB | Refills: 1 | Status: SHIPPED | OUTPATIENT
Start: 2018-11-21 | End: 2018-12-14 | Stop reason: SDUPTHER

## 2018-12-14 ENCOUNTER — OFFICE VISIT (OUTPATIENT)
Dept: INTERNAL MEDICINE CLINIC | Age: 53
End: 2018-12-14

## 2018-12-14 VITALS
SYSTOLIC BLOOD PRESSURE: 113 MMHG | HEIGHT: 69 IN | WEIGHT: 195.8 LBS | DIASTOLIC BLOOD PRESSURE: 70 MMHG | OXYGEN SATURATION: 95 % | RESPIRATION RATE: 18 BRPM | BODY MASS INDEX: 29 KG/M2 | HEART RATE: 68 BPM

## 2018-12-14 DIAGNOSIS — R03.0 BORDERLINE HYPERTENSION: ICD-10-CM

## 2018-12-14 DIAGNOSIS — Z12.5 SCREENING PSA (PROSTATE SPECIFIC ANTIGEN): ICD-10-CM

## 2018-12-14 DIAGNOSIS — L70.0 CYSTIC ACNE: ICD-10-CM

## 2018-12-14 DIAGNOSIS — E78.2 MIXED HYPERLIPIDEMIA: ICD-10-CM

## 2018-12-14 DIAGNOSIS — E29.1 MALE HYPOGONADISM: ICD-10-CM

## 2018-12-14 DIAGNOSIS — K21.00 GASTROESOPHAGEAL REFLUX DISEASE WITH ESOPHAGITIS: ICD-10-CM

## 2018-12-14 DIAGNOSIS — L72.0 INCLUSION CYST: Primary | ICD-10-CM

## 2018-12-14 DIAGNOSIS — E87.6 HYPOKALEMIA: ICD-10-CM

## 2018-12-14 DIAGNOSIS — F41.9 ANXIETY: ICD-10-CM

## 2018-12-14 RX ORDER — PANTOPRAZOLE SODIUM 40 MG/1
40 TABLET, DELAYED RELEASE ORAL DAILY
Qty: 90 TAB | Refills: 1 | Status: SHIPPED | OUTPATIENT
Start: 2018-12-14 | End: 2019-06-04 | Stop reason: SDUPTHER

## 2018-12-14 RX ORDER — ALPRAZOLAM 0.25 MG/1
0.25 TABLET ORAL
Qty: 90 TAB | Refills: 0 | Status: SHIPPED | OUTPATIENT
Start: 2018-12-14 | End: 2019-06-04 | Stop reason: SDUPTHER

## 2018-12-14 RX ORDER — TESTOSTERONE CYPIONATE 200 MG/ML
200 INJECTION INTRAMUSCULAR
Qty: 10 ML | Refills: 5 | Status: SHIPPED | OUTPATIENT
Start: 2018-12-14 | End: 2019-06-21 | Stop reason: SDUPTHER

## 2018-12-14 RX ORDER — CLINDAMYCIN PHOSPHATE 11.9 MG/ML
SOLUTION TOPICAL
Qty: 60 ML | Refills: 5 | Status: SHIPPED | OUTPATIENT
Start: 2018-12-14 | End: 2019-06-21 | Stop reason: SDUPTHER

## 2018-12-14 RX ORDER — PRAVASTATIN SODIUM 20 MG/1
20 TABLET ORAL
Qty: 90 TAB | Refills: 1 | Status: SHIPPED | OUTPATIENT
Start: 2018-12-14 | End: 2019-06-21 | Stop reason: SDUPTHER

## 2018-12-14 RX ORDER — SPIRONOLACTONE 100 MG/1
100 TABLET, FILM COATED ORAL DAILY
Qty: 90 TAB | Refills: 1 | Status: SHIPPED | OUTPATIENT
Start: 2018-12-14 | End: 2019-06-04 | Stop reason: SDUPTHER

## 2018-12-14 RX ORDER — HYDROCHLOROTHIAZIDE 25 MG/1
TABLET ORAL
Qty: 90 TAB | Refills: 1 | Status: SHIPPED | OUTPATIENT
Start: 2018-12-14 | End: 2019-06-04 | Stop reason: SDUPTHER

## 2018-12-14 RX ORDER — SILDENAFIL CITRATE 20 MG/1
TABLET ORAL
Qty: 90 TAB | Refills: 5 | Status: SHIPPED | OUTPATIENT
Start: 2018-12-14 | End: 2019-06-21 | Stop reason: SDUPTHER

## 2018-12-14 NOTE — PROGRESS NOTES
All health maintenance and other pertinent information has been reviewed in preparation for today's office visit. Patient presents in the office today for:    Chief Complaint   Patient presents with    Medication Refill     1. Have you been to the ER, urgent care clinic since your last visit? Hospitalized since your last visit? No    2. Have you seen or consulted any other health care providers outside of the [address] [place] [address] since your last visit? Include any pap smears or colon screening.  No

## 2018-12-14 NOTE — PROGRESS NOTES
HISTORY OF PRESENT ILLNESS    Chief Complaint   Patient presents with    Medication Refill       Presents for follow-up  Working in [employer]. 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 He will graduate from his course work in December 2019. He looks forward to being done. Right earlobe epidermal inclusion cyst.  Present for a year or 2. It is relatively large and he would like it incised. He has tried squeezing on it with mild production. Denies any fevers or chills. Cystic acne with hypertension. He is taking spironolactone, HCTZ. Feels that the spironolactone has helped his cystic acne somewhat. Blood pressure 113/70, pulse 68, resp. rate 18, height 5' 9\" (1.753 m), weight 195 lb 12.8 oz (88.8 kg), SpO2 95 %. Chronic anxiety. Relatively severe at times. He is taking alprazolam very sparingly. Last refill 90 pills in May 2018. He asked about whether I can prescribe him cannabinoids. He said that he took 1 of his friends cannabinoids and it helped a lot. He thinks he can stop alprazolam and replace it with cannabinoids. Followed by infectious disease for HIV prep prophylaxis. Continues to participate in high risk sexual behavior. He has erectile dysfunction for which he takes sildenafil    Hyperlipidemia  ROS: taking medications as instructed, no medication side effects noted  No new myalgias, no joint pains, no weakness  No TIA's, no chest pain on exertion, no dyspnea on exertion, no swelling of ankles.    Lab Results   Component Value Date/Time    Cholesterol, total 193 12/14/2018 02:05 PM    HDL Cholesterol 33 (L) 12/14/2018 02:05 PM    LDL, calculated 131 (H) 12/14/2018 02:05 PM    VLDL, calculated 29 12/14/2018 02:05 PM    Triglyceride 147 12/14/2018 02:05 PM         Review of Systems   All other systems reviewed and are negative, except as noted in HPI    Past Medical and Surgical History   has a past medical history of Anxiety, Borderline hypertension, Cystic acne, Diverticulitis of colon, Eczema, GERD (gastroesophageal reflux disease), Hyperlipidemia, Insomnia, Male hypogonadism, Skin abscess, Tubular adenoma, Vitamin B12 deficiency, and Vitamin D deficiency. has a past surgical history that includes hx cholecystectomy (2005); hx colonoscopy (11/7/13); pr breast surgery procedure unlisted (Left, 12/2017); hx heent (Bilateral, 2007); and LEFT BREAST NIPPLE EXCISION OF SEBACEOUS CYST (Left, 1/16/2018). reports that he quit smoking about 11 years ago. He has a 20.00 pack-year smoking history. He quit smokeless tobacco use about 11 years ago. He reports that he drinks alcohol. He reports that he uses drugs. Drug: Marijuana. family history includes Cancer in his mother; Hypertension in his mother and sister. Physical Exam   Nursing note and vitals reviewed. Blood pressure 113/70, pulse 68, resp. rate 18, height 5' 9\" (1.753 m), weight 195 lb 12.8 oz (88.8 kg), SpO2 95 %. Constitutional:  No distress. Eyes: Conjunctivae are normal.   Ears:  Hearing grossly intact  Earlobe with 2 centimeters cyst.  Cardiovascular: Normal rate. regular rhythm, no murmurs or gallops  No edema  Pulmonary/Chest: Effort normal.   CTAB  Musculoskeletal: moves all 4 extremities   Neurological: Alert and oriented to person, place, and time. Skin: No rash noted. Psychiatric: anxious    ASSESSMENT and PLAN  Diagnoses and all orders for this visit:    1. Inclusion cyst  Right earlobe. Injected with 1/2 cc of 1% lidocaine. Incised and was able to express a large amount of sebum. No evidence of purulence. Tolerated well. Keep covered. 2. Borderline hypertension  Controlled on current regimen. Continue current medications as written in chart.  -     hydroCHLOROthiazide (HYDRODIURIL) 25 mg tablet; TAKE 1 TABLET BY MOUTH ONCE DAILY FOR EDEMA. 3. Hypokalemia  Check labs. Taking HCTZ as well  -     spironolactone (ALDACTONE) 100 mg tablet; Take 1 Tab by mouth daily.  DO NOT TAKE WITH POTASSIUM    4. Gastroesophageal reflux disease with esophagitis  Controlled on current regimen. Continue current medications as written in chart  -     pantoprazole (PROTONIX) 40 mg tablet; Take 1 Tab by mouth daily. 5. Anxiety  Discussed, I do not prescribe cannabinoids at this time. Continue alprazolam as needed. -     ALPRAZolam (XANAX) 0.25 mg tablet; Take 1 Tab by mouth daily as needed for Anxiety or Sleep. 6. Mixed hyperlipidemia  Controlled on current regimen. Continue current medications as written in chart. -     pravastatin (PRAVACHOL) 20 mg tablet; Take 1 Tab by mouth nightly. -     LIPID PANEL  -     METABOLIC PANEL, COMPREHENSIVE    7. Male hypogonadism  Using injections which help some, does complain of residual fatigue and erectile dysfunction. -     testosterone cypionate (DEPOTESTOTERONE CYPIONATE) 200 mg/mL injection; 1 mL by IntraMUSCular route every fourteen (14) days. Max Daily Amount: 200 mg. Please dispense 10 ml bottle  -     PSA W/ REFLX FREE PSA    8. Screening PSA (prostate specific antigen)  -     PSA W/ REFLX FREE PSA    9. Cystic acne  Controlled on current regimen. Continue current medications as written in chart. -     clindamycin (CLEOCIN T) 1 % external solution; use thin film on affected area    lab results and schedule of future lab studies reviewed with patient  reviewed medications and side effects in detail    Return to clinic for further evaluation if new symptoms develop    Over 50% of the 60 minutes face to face with Madai Thomas consisted of counseling and/or discussing treatment plans in reference to his acne, anxiety        Current Outpatient Medications   Medication Sig    sildenafil, antihypertensive, (REVATIO) 20 mg tablet Take 3-5 pills once daily as needed    hydroCHLOROthiazide (HYDRODIURIL) 25 mg tablet TAKE 1 TABLET BY MOUTH ONCE DAILY FOR EDEMA.  spironolactone (ALDACTONE) 100 mg tablet Take 1 Tab by mouth daily.  DO NOT TAKE WITH POTASSIUM  pantoprazole (PROTONIX) 40 mg tablet Take 1 Tab by mouth daily.  ALPRAZolam (XANAX) 0.25 mg tablet Take 1 Tab by mouth daily as needed for Anxiety or Sleep.  pravastatin (PRAVACHOL) 20 mg tablet Take 1 Tab by mouth nightly.  testosterone cypionate (DEPOTESTOTERONE CYPIONATE) 200 mg/mL injection 1 mL by IntraMUSCular route every fourteen (14) days. Max Daily Amount: 200 mg. Please dispense 10 ml bottle    clindamycin (CLEOCIN T) 1 % external solution use thin film on affected area    TRUVADA 200-300 mg per tablet Take 1 Tab by mouth daily. No current facility-administered medications for this visit.

## 2018-12-15 LAB
ALBUMIN SERPL-MCNC: 4.7 G/DL (ref 3.5–5.5)
ALBUMIN/GLOB SERPL: 1.6 {RATIO} (ref 1.2–2.2)
ALP SERPL-CCNC: 88 IU/L (ref 39–117)
ALT SERPL-CCNC: 19 IU/L (ref 0–44)
AST SERPL-CCNC: 33 IU/L (ref 0–40)
BILIRUB SERPL-MCNC: 0.7 MG/DL (ref 0–1.2)
BUN SERPL-MCNC: 12 MG/DL (ref 6–24)
BUN/CREAT SERPL: 8 (ref 9–20)
CALCIUM SERPL-MCNC: 9.7 MG/DL (ref 8.7–10.2)
CHLORIDE SERPL-SCNC: 95 MMOL/L (ref 96–106)
CHOLEST SERPL-MCNC: 193 MG/DL (ref 100–199)
CO2 SERPL-SCNC: 28 MMOL/L (ref 20–29)
CREAT SERPL-MCNC: 1.58 MG/DL (ref 0.76–1.27)
GLOBULIN SER CALC-MCNC: 3 G/DL (ref 1.5–4.5)
GLUCOSE SERPL-MCNC: 96 MG/DL (ref 65–99)
HDLC SERPL-MCNC: 33 MG/DL
INTERPRETATION, 910389: NORMAL
INTERPRETATION: NORMAL
LDLC SERPL CALC-MCNC: 131 MG/DL (ref 0–99)
Lab: NORMAL
POTASSIUM SERPL-SCNC: 4.3 MMOL/L (ref 3.5–5.2)
PROT SERPL-MCNC: 7.7 G/DL (ref 6–8.5)
PSA SERPL-MCNC: 1.2 NG/ML (ref 0–4)
REFLEX CRITERIA: NORMAL
SODIUM SERPL-SCNC: 135 MMOL/L (ref 134–144)
TRIGL SERPL-MCNC: 147 MG/DL (ref 0–149)
VLDLC SERPL CALC-MCNC: 29 MG/DL (ref 5–40)

## 2019-05-28 LAB — CREATININE, EXTERNAL: 1.33

## 2019-06-04 DIAGNOSIS — R03.0 BORDERLINE HYPERTENSION: ICD-10-CM

## 2019-06-04 DIAGNOSIS — E87.6 HYPOKALEMIA: ICD-10-CM

## 2019-06-04 DIAGNOSIS — K21.00 GASTROESOPHAGEAL REFLUX DISEASE WITH ESOPHAGITIS: ICD-10-CM

## 2019-06-04 DIAGNOSIS — F41.9 ANXIETY: ICD-10-CM

## 2019-06-05 RX ORDER — HYDROCHLOROTHIAZIDE 25 MG/1
TABLET ORAL
Qty: 30 TAB | Refills: 3 | Status: SHIPPED | OUTPATIENT
Start: 2019-06-05 | End: 2019-06-21 | Stop reason: SDUPTHER

## 2019-06-05 RX ORDER — PANTOPRAZOLE SODIUM 40 MG/1
40 TABLET, DELAYED RELEASE ORAL DAILY
Qty: 30 TAB | Refills: 3 | Status: SHIPPED | OUTPATIENT
Start: 2019-06-05 | End: 2019-06-21 | Stop reason: SDUPTHER

## 2019-06-05 RX ORDER — ALPRAZOLAM 0.25 MG/1
0.25 TABLET ORAL
Qty: 30 TAB | Refills: 0 | OUTPATIENT
Start: 2019-06-05 | End: 2019-06-21 | Stop reason: SDUPTHER

## 2019-06-05 RX ORDER — SPIRONOLACTONE 100 MG/1
100 TABLET, FILM COATED ORAL DAILY
Qty: 30 TAB | Refills: 3 | Status: SHIPPED | OUTPATIENT
Start: 2019-06-05 | End: 2019-06-21 | Stop reason: SDUPTHER

## 2019-06-06 RX ORDER — SPIRONOLACTONE 100 MG/1
100 TABLET, FILM COATED ORAL DAILY
Qty: 30 TAB | Refills: 3 | Status: CANCELLED | OUTPATIENT
Start: 2019-06-06

## 2019-06-06 RX ORDER — PANTOPRAZOLE SODIUM 40 MG/1
40 TABLET, DELAYED RELEASE ORAL DAILY
Qty: 30 TAB | Refills: 3 | Status: CANCELLED | OUTPATIENT
Start: 2019-06-06

## 2019-06-06 RX ORDER — HYDROCHLOROTHIAZIDE 25 MG/1
TABLET ORAL
Qty: 30 TAB | Refills: 3 | Status: CANCELLED | OUTPATIENT
Start: 2019-06-06

## 2019-06-21 ENCOUNTER — OFFICE VISIT (OUTPATIENT)
Dept: INTERNAL MEDICINE CLINIC | Age: 54
End: 2019-06-21

## 2019-06-21 VITALS
HEIGHT: 69 IN | OXYGEN SATURATION: 92 % | HEART RATE: 81 BPM | BODY MASS INDEX: 29.27 KG/M2 | DIASTOLIC BLOOD PRESSURE: 81 MMHG | SYSTOLIC BLOOD PRESSURE: 123 MMHG | WEIGHT: 197.6 LBS | RESPIRATION RATE: 18 BRPM | TEMPERATURE: 98.9 F

## 2019-06-21 DIAGNOSIS — E78.2 MIXED HYPERLIPIDEMIA: ICD-10-CM

## 2019-06-21 DIAGNOSIS — L70.0 CYSTIC ACNE: ICD-10-CM

## 2019-06-21 DIAGNOSIS — F41.9 ANXIETY: ICD-10-CM

## 2019-06-21 DIAGNOSIS — E87.6 HYPOKALEMIA: ICD-10-CM

## 2019-06-21 DIAGNOSIS — E29.1 MALE HYPOGONADISM: ICD-10-CM

## 2019-06-21 DIAGNOSIS — K21.00 GASTROESOPHAGEAL REFLUX DISEASE WITH ESOPHAGITIS: ICD-10-CM

## 2019-06-21 DIAGNOSIS — R03.0 BORDERLINE HYPERTENSION: ICD-10-CM

## 2019-06-21 DIAGNOSIS — K57.32 DIVERTICULITIS OF COLON: ICD-10-CM

## 2019-06-21 DIAGNOSIS — N52.9 ERECTILE DYSFUNCTION, UNSPECIFIED ERECTILE DYSFUNCTION TYPE: ICD-10-CM

## 2019-06-21 DIAGNOSIS — R10.32 LLQ ABDOMINAL PAIN: Primary | ICD-10-CM

## 2019-06-21 RX ORDER — ALPRAZOLAM 0.25 MG/1
0.25 TABLET ORAL
Qty: 90 TAB | Refills: 3 | Status: SHIPPED | OUTPATIENT
Start: 2019-06-21 | End: 2019-10-01

## 2019-06-21 RX ORDER — CLINDAMYCIN PHOSPHATE 11.9 MG/ML
SOLUTION TOPICAL
Qty: 60 ML | Refills: 5 | Status: SHIPPED | OUTPATIENT
Start: 2019-06-21 | End: 2020-01-17 | Stop reason: SDUPTHER

## 2019-06-21 RX ORDER — TESTOSTERONE CYPIONATE 200 MG/ML
200 INJECTION INTRAMUSCULAR
Qty: 10 ML | Refills: 5 | Status: SHIPPED | OUTPATIENT
Start: 2019-06-21 | End: 2020-01-17 | Stop reason: SDUPTHER

## 2019-06-21 RX ORDER — HYDROCHLOROTHIAZIDE 25 MG/1
TABLET ORAL
Qty: 90 TAB | Refills: 3 | Status: SHIPPED | OUTPATIENT
Start: 2019-06-21 | End: 2020-01-17 | Stop reason: SDUPTHER

## 2019-06-21 RX ORDER — SPIRONOLACTONE 100 MG/1
100 TABLET, FILM COATED ORAL DAILY
Qty: 90 TAB | Refills: 3 | Status: SHIPPED | OUTPATIENT
Start: 2019-06-21 | End: 2020-01-17 | Stop reason: SDUPTHER

## 2019-06-21 RX ORDER — CIPROFLOXACIN 500 MG/1
500 TABLET ORAL 2 TIMES DAILY
Qty: 20 TAB | Refills: 0 | Status: SHIPPED | OUTPATIENT
Start: 2019-06-21 | End: 2019-07-01

## 2019-06-21 RX ORDER — SUCRALFATE 1 G/10ML
1 SUSPENSION ORAL 4 TIMES DAILY
Qty: 100 ML | Refills: 2 | Status: SHIPPED | OUTPATIENT
Start: 2019-06-21 | End: 2020-01-17 | Stop reason: ALTCHOICE

## 2019-06-21 RX ORDER — SILDENAFIL CITRATE 20 MG/1
TABLET ORAL
Qty: 90 TAB | Refills: 5 | Status: SHIPPED | OUTPATIENT
Start: 2019-06-21 | End: 2020-01-17 | Stop reason: SDUPTHER

## 2019-06-21 RX ORDER — METRONIDAZOLE 500 MG/1
500 TABLET ORAL 3 TIMES DAILY
Qty: 30 TAB | Refills: 0 | Status: SHIPPED | OUTPATIENT
Start: 2019-06-21 | End: 2019-07-01

## 2019-06-21 RX ORDER — PRAVASTATIN SODIUM 20 MG/1
20 TABLET ORAL
Qty: 90 TAB | Refills: 3 | Status: SHIPPED | OUTPATIENT
Start: 2019-06-21 | End: 2020-01-17 | Stop reason: SDUPTHER

## 2019-06-21 RX ORDER — PANTOPRAZOLE SODIUM 40 MG/1
40 TABLET, DELAYED RELEASE ORAL DAILY
Qty: 90 TAB | Refills: 3 | Status: SHIPPED | OUTPATIENT
Start: 2019-06-21 | End: 2020-01-17 | Stop reason: SDUPTHER

## 2019-06-21 NOTE — PATIENT INSTRUCTIONS
Body Mass Index: Care Instructions Your Care Instructions Body mass index (BMI) can help you see if your weight is raising your risk for health problems. It uses a formula to compare how much you weigh with how tall you are. · A BMI lower than 18.5 is considered underweight. · A BMI between 18.5 and 24.9 is considered healthy. · A BMI between 25 and 29.9 is considered overweight. A BMI of 30 or higher is considered obese. If your BMI is in the normal range, it means that you have a lower risk for weight-related health problems. If your BMI is in the overweight or obese range, you may be at increased risk for weight-related health problems, such as high blood pressure, heart disease, stroke, arthritis or joint pain, and diabetes. If your BMI is in the underweight range, you may be at increased risk for health problems such as fatigue, lower protection (immunity) against illness, muscle loss, bone loss, hair loss, and hormone problems. BMI is just one measure of your risk for weight-related health problems. You may be at higher risk for health problems if you are not active, you eat an unhealthy diet, or you drink too much alcohol or use tobacco products. Follow-up care is a key part of your treatment and safety. Be sure to make and go to all appointments, and call your doctor if you are having problems. It's also a good idea to know your test results and keep a list of the medicines you take. How can you care for yourself at home? · Practice healthy eating habits. This includes eating plenty of fruits, vegetables, whole grains, lean protein, and low-fat dairy. · If your doctor recommends it, get more exercise. Walking is a good choice. Bit by bit, increase the amount you walk every day. Try for at least 30 minutes on most days of the week. · Do not smoke. Smoking can increase your risk for health problems.  If you need help quitting, talk to your doctor about stop-smoking programs and medicines. These can increase your chances of quitting for good. · Limit alcohol to 2 drinks a day for men and 1 drink a day for women. Too much alcohol can cause health problems. If you have a BMI higher than 25 · Your doctor may do other tests to check your risk for weight-related health problems. This may include measuring the distance around your waist. A waist measurement of more than 40 inches in men or 35 inches in women can increase the risk of weight-related health problems. · Talk with your doctor about steps you can take to stay healthy or improve your health. You may need to make lifestyle changes to lose weight and stay healthy, such as changing your diet and getting regular exercise. If you have a BMI lower than 18.5 · Your doctor may do other tests to check your risk for health problems. · Talk with your doctor about steps you can take to stay healthy or improve your health. You may need to make lifestyle changes to gain or maintain weight and stay healthy, such as getting more healthy foods in your diet and doing exercises to build muscle. Where can you learn more? Go to http://cleve-beverly.info/. Enter S176 in the search box to learn more about \"Body Mass Index: Care Instructions. \" Current as of: October 13, 2016 Content Version: 11.4 © 6229-3037 [employer]. Care instructions adapted under license by [employer] (which disclaims liability or warranty for this information). If you have questions about a medical condition or this instruction, always ask your healthcare professional. [address] any warranty or liability for your use of this information.

## 2019-06-21 NOTE — PROGRESS NOTES
HISTORY OF PRESENT ILLNESS    Chief Complaint   Patient presents with    GERD     f/u       Presents for follow-up  He is irritable today. Is upset that his email messages on [employer] are not sent directly to me only. He is also frustrated that we had difficulty refilling his medications because he was overdue for an appointment. I discussed that we did send in f4 his medications to [facility] on June 5 as is clearly documented in his chart, but he says [facility] never received these orders aside from a Xanax that we called in. Reports various abdominal pains over the past couple of weeks. Symptoms have been worsening. He attributes these pains to being out of Protonix 40 takes for GERD. States that he has epigastric pain, left lower quadrant pain. He is belching more. Denies any blood in the stool. He does have a history of diverticulitis. Denies any significant constipation but does feel some bloating. Denies diarrhea. No recent other antibiotics. He is followed by infectious disease doctor for PREP. Recent labs show negative HIV, negative RPR. Creatinine, potassium are normal.  He shows me these results on his phone. Lab Results   Component Value Date/Time    Creatinine 1.58 (H) 12/14/2018 02:06 PM   Creatinine 1.33    Hypertension  Hypertension ROS: taking medications as instructed, no medication side effects noted, no TIA's, no chest pain on exertion, no dyspnea on exertion, no swelling of ankles     reports that he quit smoking about 12 years ago. He has a 20.00 pack-year smoking history. He quit smokeless tobacco use about 12 years ago. reports that he drinks alcohol. BP Readings from Last 2 Encounters:   06/21/19 123/81   12/14/18 113/70     Hyperlipidemia  ROS: taking medications as instructed, no medication side effects noted  No new myalgias, no joint pains, no weakness  No TIA's, no chest pain on exertion, no dyspnea on exertion, no swelling of ankles.    Lab Results   Component Value Date/Time    Cholesterol, total 193 12/14/2018 02:05 PM    HDL Cholesterol 33 (L) 12/14/2018 02:05 PM    LDL, calculated 131 (H) 12/14/2018 02:05 PM    VLDL, calculated 29 12/14/2018 02:05 PM    Triglyceride 147 12/14/2018 02:05 PM     Anxiety  Patient is seen for anxiety disorder. Current treatment includes Xanax and no other therapies. Ongoing symptoms include: none. Patient denies: sweating, chest pain, dizziness, paresthesias, racing thoughts, feelings of losing control, difficulty concentrating, suicidal ideation. Denies substance or alcohol abuse. Reported side effects from the treatment: none. Review of Systems   All other systems reviewed and are negative, except as noted in HPI    Past Medical and Surgical History   has a past medical history of Anxiety, Borderline hypertension, Cystic acne, Diverticulitis of colon (7/2014), Eczema, GERD (gastroesophageal reflux disease), Hyperlipidemia, Insomnia, Male hypogonadism (2012), Skin abscess, Tubular adenoma (11/2013), Vitamin B12 deficiency, and Vitamin D deficiency. has a past surgical history that includes hx cholecystectomy (2005); hx colonoscopy (11/7/13); pr breast surgery procedure unlisted (Left, 12/2017); hx heent (Bilateral, 2007); and hx breast biopsy (Left, 1/16/2018). reports that he quit smoking about 12 years ago. He has a 20.00 pack-year smoking history. He quit smokeless tobacco use about 12 years ago. He reports that he drinks alcohol. He reports that he has current or past drug history. Drug: Marijuana. family history includes Cancer in his mother; Hypertension in his mother and sister. Physical Exam   Nursing note and vitals reviewed. Blood pressure 123/81, pulse 81, temperature 98.9 °F (37.2 °C), temperature source Oral, resp. rate 18, height 5' 9\" (1.753 m), weight 197 lb 9.6 oz (89.6 kg), SpO2 92 %. Constitutional:  No distress.     Eyes: Conjunctivae are normal.   Ears:  Hearing grossly intact  Cardiovascular: Normal rate.  regular rhythm, no murmurs or gallops  No edema   Abdomen is soft, mild epigastric discomfort. Moderate left lower quadrant discomfort with deep palpation. No rebound or guarding. Pulmonary/Chest: Effort normal.   CTAB  Musculoskeletal: moves all 4 extremities   Neurological: Alert and oriented to person, place, and time. Skin: No rash noted. Psychiatric: Anxious, irritable. ASSESSMENT and PLAN  Diagnoses and all orders for this visit:    1. LLQ abdominal pain  Concerned that he could have a mild diverticulitis. He is doing overall fairly well and symptoms are intermittent bu but persistent. no fever, chills, eating well and drinking well. No other alarm symptoms. Mild to moderate uncomplicated diverticulitis may be treated without antibiotics. Observe for now. Patient actually does not want to take antibiotics, but I did give him prescriptions for Cipro and Flagyl if symptoms are worsening. If develops high fever, chills, severe pain, should get evaluated by emergency room. He voices understanding. No additional medication for abdominal pain is recommended. 2. Borderline hypertension  Controlled on current regimen. Continue current medications as written in chart.  -     hydroCHLOROthiazide (HYDRODIURIL) 25 mg tablet; TAKE 1 TABLET BY MOUTH ONCE DAILY FOR EDEMA. 3. Hypokalemia- controlled  -     spironolactone (ALDACTONE) 100 mg tablet; Take 1 Tab by mouth daily. DO NOT TAKE WITH POTASSIUM    4. Gastroesophageal reflux disease with esophagitis  Reports symptoms are uncontrolled. Resume Protonix. Given Carafate as needed for abdominal pain. -     pantoprazole (PROTONIX) 40 mg tablet; Take 1 Tab by mouth daily. -     sucralfate (CARAFATE) 100 mg/mL suspension; Take 5 mL by mouth four (4) times daily. 5. Male hypogonadism  -     testosterone cypionate (DEPOTESTOTERONE CYPIONATE) 200 mg/mL injection; 1 mL by IntraMUSCular route every fourteen (14) days.  Max Daily Amount: 200 mg. Please dispense 10 ml bottle    6. Mixed hyperlipidemia  Controlled on current regimen. Continue current medications as written in chart. -     pravastatin (PRAVACHOL) 20 mg tablet; Take 1 Tab by mouth nightly. 7. Anxiety  -     ALPRAZolam (XANAX) 0.25 mg tablet; Take 1 Tab by mouth daily as needed for Anxiety or Sleep. Indications: anxious    8. Cystic acne  -     clindamycin (CLEOCIN T) 1 % external solution; use thin film on affected area    9. Diverticulitis of colon  -     metroNIDAZOLE (FLAGYL) 500 mg tablet; Take 1 Tab by mouth three (3) times daily for 10 days. -     ciprofloxacin HCl (CIPRO) 500 mg tablet; Take 1 Tab by mouth two (2) times a day for 10 days. 10. Erectile dysfunction, unspecified erectile dysfunction type  -     sildenafil, antihypertensive, (REVATIO) 20 mg tablet; Take 3-5 pills once daily as needed        lab results and schedule of future lab studies reviewed with patient  reviewed medications and side effects in detail  Return to clinic for further evaluation if new symptoms develop        Current Outpatient Medications   Medication Sig    hydroCHLOROthiazide (HYDRODIURIL) 25 mg tablet TAKE 1 TABLET BY MOUTH ONCE DAILY FOR EDEMA.  spironolactone (ALDACTONE) 100 mg tablet Take 1 Tab by mouth daily. DO NOT TAKE WITH POTASSIUM    pantoprazole (PROTONIX) 40 mg tablet Take 1 Tab by mouth daily.  pravastatin (PRAVACHOL) 20 mg tablet Take 1 Tab by mouth nightly.  testosterone cypionate (DEPOTESTOTERONE CYPIONATE) 200 mg/mL injection 1 mL by IntraMUSCular route every fourteen (14) days. Max Daily Amount: 200 mg. Please dispense 10 ml bottle    clindamycin (CLEOCIN T) 1 % external solution use thin film on affected area    ALPRAZolam (XANAX) 0.25 mg tablet Take 1 Tab by mouth daily as needed for Anxiety or Sleep.  Indications: anxious    sildenafil, antihypertensive, (REVATIO) 20 mg tablet Take 3-5 pills once daily as needed    sucralfate (CARAFATE) 100 mg/mL suspension Take 5 mL by mouth four (4) times daily.  metroNIDAZOLE (FLAGYL) 500 mg tablet Take 1 Tab by mouth three (3) times daily for 10 days.  ciprofloxacin HCl (CIPRO) 500 mg tablet Take 1 Tab by mouth two (2) times a day for 10 days.  TRUVADA 200-300 mg per tablet Take 1 Tab by mouth daily. No current facility-administered medications for this visit. Discussed the patient's BMI with him. The BMI follow up plan is as follows:     dietary management education, guidance, and counseling  encourage exercise  monitor weight  prescribed dietary intake    An After Visit Summary was printed and given to the patient. This note will not be viewable in [address]

## 2019-10-01 ENCOUNTER — TELEPHONE (OUTPATIENT)
Dept: INTERNAL MEDICINE CLINIC | Age: 54
End: 2019-10-01

## 2019-10-01 DIAGNOSIS — F41.9 ANXIETY: ICD-10-CM

## 2019-10-01 RX ORDER — ALPRAZOLAM 0.25 MG/1
TABLET ORAL
Qty: 135 TAB | Refills: 0 | OUTPATIENT
Start: 2019-10-01 | End: 2020-01-17 | Stop reason: SDUPTHER

## 2019-10-01 NOTE — TELEPHONE ENCOUNTER
Patient reports increased anxiety during the day, panic at times. 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 He is taking xanax 1 mg hs and now has sometimes taken 1/2-1 pill in the day prn. None in day this week, but daily last week. He is given #90 for 90 day and has about 30 pills left. Aurelia Villalta- call in new Rx for xanax 1 mg #135 as written- to be filled 10/10 or later. No refills.  profile was accessed online for [facility] and reviewed by me during this encounter. I did not see evidence of inappropriate or suspicious controlled substance prescription activity.

## 2020-01-02 ENCOUNTER — TELEPHONE (OUTPATIENT)
Dept: INTERNAL MEDICINE CLINIC | Age: 55
End: 2020-01-02

## 2020-01-02 NOTE — TELEPHONE ENCOUNTER
Patient called to report that he has a sore throat. PSR offered patient an appointment with NP 1/3/2020. Pateint declined only wants to see PCP. Appointment with PCP was scheduled on Friday, January 17, 2020 01:20 PM. Patient request if able to see PCP sooner please call to advise.  [phone]

## 2020-01-17 ENCOUNTER — OFFICE VISIT (OUTPATIENT)
Dept: INTERNAL MEDICINE CLINIC | Age: 55
End: 2020-01-17

## 2020-01-17 VITALS
TEMPERATURE: 98.3 F | HEIGHT: 69 IN | SYSTOLIC BLOOD PRESSURE: 140 MMHG | BODY MASS INDEX: 31.4 KG/M2 | HEART RATE: 70 BPM | RESPIRATION RATE: 18 BRPM | OXYGEN SATURATION: 93 % | DIASTOLIC BLOOD PRESSURE: 80 MMHG | WEIGHT: 212 LBS

## 2020-01-17 DIAGNOSIS — K21.00 GASTROESOPHAGEAL REFLUX DISEASE WITH ESOPHAGITIS: ICD-10-CM

## 2020-01-17 DIAGNOSIS — N52.9 ERECTILE DYSFUNCTION, UNSPECIFIED ERECTILE DYSFUNCTION TYPE: ICD-10-CM

## 2020-01-17 DIAGNOSIS — L70.0 CYSTIC ACNE: ICD-10-CM

## 2020-01-17 DIAGNOSIS — E78.2 MIXED HYPERLIPIDEMIA: ICD-10-CM

## 2020-01-17 DIAGNOSIS — F41.9 ANXIETY: ICD-10-CM

## 2020-01-17 DIAGNOSIS — E87.6 HYPOKALEMIA: ICD-10-CM

## 2020-01-17 DIAGNOSIS — Z23 ENCOUNTER FOR IMMUNIZATION: ICD-10-CM

## 2020-01-17 DIAGNOSIS — R03.0 BORDERLINE HYPERTENSION: Primary | ICD-10-CM

## 2020-01-17 DIAGNOSIS — E29.1 MALE HYPOGONADISM: ICD-10-CM

## 2020-01-17 RX ORDER — CLINDAMYCIN PHOSPHATE 11.9 MG/ML
SOLUTION TOPICAL
Qty: 60 ML | Refills: 5 | Status: SHIPPED | OUTPATIENT
Start: 2020-01-17 | End: 2020-07-17 | Stop reason: SDUPTHER

## 2020-01-17 RX ORDER — HYDROCHLOROTHIAZIDE 25 MG/1
TABLET ORAL
Qty: 90 TAB | Refills: 3 | Status: SHIPPED | OUTPATIENT
Start: 2020-01-17 | End: 2020-07-17 | Stop reason: SDUPTHER

## 2020-01-17 RX ORDER — SPIRONOLACTONE 100 MG/1
100 TABLET, FILM COATED ORAL DAILY
Qty: 90 TAB | Refills: 3 | Status: SHIPPED | OUTPATIENT
Start: 2020-01-17 | End: 2020-07-17 | Stop reason: SDUPTHER

## 2020-01-17 RX ORDER — TESTOSTERONE CYPIONATE 200 MG/ML
200 INJECTION INTRAMUSCULAR
Qty: 10 ML | Refills: 5 | Status: SHIPPED | OUTPATIENT
Start: 2020-01-17 | End: 2020-07-17 | Stop reason: SDUPTHER

## 2020-01-17 RX ORDER — SILDENAFIL CITRATE 20 MG/1
TABLET ORAL
Qty: 90 TAB | Refills: 5 | Status: SHIPPED | OUTPATIENT
Start: 2020-01-17 | End: 2020-07-17 | Stop reason: SDUPTHER

## 2020-01-17 RX ORDER — ALPRAZOLAM 0.25 MG/1
TABLET ORAL
Qty: 90 TAB | Refills: 0 | Status: SHIPPED | OUTPATIENT
Start: 2020-01-17 | End: 2020-05-27 | Stop reason: SDUPTHER

## 2020-01-17 RX ORDER — PANTOPRAZOLE SODIUM 40 MG/1
40 TABLET, DELAYED RELEASE ORAL DAILY
Qty: 90 TAB | Refills: 3 | Status: SHIPPED | OUTPATIENT
Start: 2020-01-17 | End: 2020-07-17 | Stop reason: SDUPTHER

## 2020-01-17 RX ORDER — PRAVASTATIN SODIUM 20 MG/1
20 TABLET ORAL
Qty: 90 TAB | Refills: 3 | Status: SHIPPED | OUTPATIENT
Start: 2020-01-17 | End: 2020-07-17 | Stop reason: SDUPTHER

## 2020-01-17 NOTE — PROGRESS NOTES
HISTORY OF PRESENT ILLNESS    Chief Complaint   Patient presents with    Hypertension    Other     wants to discuss protonix, pt has gallbladder removed in 2005       Presents for follow-up    Doing well. 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 Labs done at [facility] 12/18. Cr 1.41,at  baseline  Was recommended to have Hep A vaccine due to neg Ab. Anxiety. Taking xanax prn, sometimes daily. then does not take for 2 weeks. Did not fill last rx. Last was #90 11/15/19    Hypertension  Hypertension ROS: taking medications as instructed, no medication side effects noted, no TIA's, no chest pain on exertion, no dyspnea on exertion, no swelling of ankles     reports that he quit smoking about 13 years ago. He has a 20.00 pack-year smoking history. He quit smokeless tobacco use about 13 years ago. reports current alcohol use. BP Readings from Last 2 Encounters:   01/17/20 140/80   06/21/19 123/81      Review of Systems   All other systems reviewed and are negative, except as noted in HPI    Past Medical and Surgical History   has a past medical history of Anxiety, Borderline hypertension, Cystic acne, Diverticulitis of colon (7/2014), Eczema, GERD (gastroesophageal reflux disease), Hyperlipidemia, Insomnia, Male hypogonadism (2012), Skin abscess, Tubular adenoma (11/2013), Vitamin B12 deficiency, and Vitamin D deficiency. has a past surgical history that includes hx cholecystectomy (2005); hx colonoscopy (11/7/13); pr breast surgery procedure unlisted (Left, 12/2017); hx heent (Bilateral, 2007); and hx breast biopsy (Left, 1/16/2018). reports that he quit smoking about 13 years ago. He has a 20.00 pack-year smoking history. He quit smokeless tobacco use about 13 years ago. He reports current alcohol use. He reports current drug use. Drug: Marijuana. family history includes Cancer in his mother; Hypertension in his mother and sister.     Physical Exam   Nursing note and vitals reviewed. Blood pressure 140/80, pulse 70, temperature 98.3 °F (36.8 °C), temperature source Oral, resp. rate 18, height 5' 9\" (1.753 m), weight 212 lb (96.2 kg), SpO2 93 %. Constitutional:  No distress. Eyes: Conjunctivae are normal.   Ears:  Hearing grossly intact  Cardiovascular: Normal rate. regular rhythm, no murmurs or gallops  No edema  Pulmonary/Chest: Effort normal.   CTAB  Musculoskeletal: moves all 4 extremities   Neurological: Alert and oriented to person, place, and time. Skin: No rash noted. Psychiatric: Normal mood and affect. Behavior is normal.     ASSESSMENT and PLAN  Diagnoses and all orders for this visit:    1. Borderline hypertension  Controlled on current regimen. Continue current medications as written in chart.  -     hydroCHLOROthiazide (HYDRODIURIL) 25 mg tablet; TAKE 1 TABLET BY MOUTH ONCE DAILY FOR EDEMA. 2. Anxiety  Taking rx as prescribed    profile was accessed online for [facility] and reviewed by me during this encounter. I did not see evidence of inappropriate or suspicious controlled substance prescription activity.  -     ALPRAZolam (XANAX) 0.25 mg tablet; Take 1/2 pill in AM and 1 pill nightly as needed  Indications: anxious    3. Hypokalemia  Controlled on current regimen. Continue current medications as written in chart. Per recent labs  -     spironolactone (ALDACTONE) 100 mg tablet; Take 1 Tab by mouth daily. DO NOT TAKE WITH POTASSIUM    4. Gastroesophageal reflux disease with esophagitis  Controlled on current regimen. Continue current medications as written in chart. -     pantoprazole (PROTONIX) 40 mg tablet; Take 1 Tab by mouth daily. 5. Mixed hyperlipidemia  Controlled on current regimen. Continue current medications as written in chart  -     pravastatin (PRAVACHOL) 20 mg tablet; Take 1 Tab by mouth nightly. 6. Male hypogonadism  Controlled on current regimen. Continue current medications as written in chart.   -     testosterone cypionate (DEPOTESTOTERONE CYPIONATE) 200 mg/mL injection; 1 mL by IntraMUSCular route every fourteen (14) days. Max Daily Amount: 200 mg. Please dispense 10 ml bottle    7. Encounter for immunization - not immune to Hep A. 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 Repeat 6-12 mo  -     HEPATITIS A VACCINE, ADULT DOSAGE, IM  -     NH IMMUNIZ ADMIN,1 SINGLE/COMB VAC/TOXOID    8. Cystic acne - Controlled on current regimen. Continue current medications as written in chart. -     clindamycin (CLEOCIN T) 1 % external solution; use thin film on affected area    9. Erectile dysfunction, unspecified erectile dysfunction type  -     sildenafil, pulm. hypertension, (REVATIO) 20 mg tablet; Take 3-5 pills once daily as needed        There are no Patient Instructions on file for this visit.    lab results and schedule of future lab studies reviewed with patient  reviewed medications and side effects in detail    Return to clinic for further evaluation if new symptoms develop        Current Outpatient Medications   Medication Sig    ALPRAZolam (XANAX) 0.25 mg tablet Take 1/2 pill in AM and 1 pill nightly. Increased 10/1/19  Indications: anxious    hydroCHLOROthiazide (HYDRODIURIL) 25 mg tablet TAKE 1 TABLET BY MOUTH ONCE DAILY FOR EDEMA.  spironolactone (ALDACTONE) 100 mg tablet Take 1 Tab by mouth daily. DO NOT TAKE WITH POTASSIUM    pantoprazole (PROTONIX) 40 mg tablet Take 1 Tab by mouth daily.  pravastatin (PRAVACHOL) 20 mg tablet Take 1 Tab by mouth nightly.  testosterone cypionate (DEPOTESTOTERONE CYPIONATE) 200 mg/mL injection 1 mL by IntraMUSCular route every fourteen (14) days. Max Daily Amount: 200 mg. Please dispense 10 ml bottle    clindamycin (CLEOCIN T) 1 % external solution use thin film on affected area    sildenafil, antihypertensive, (REVATIO) 20 mg tablet Take 3-5 pills once daily as needed    TRUVADA 200-300 mg per tablet Take 1 Tab by mouth daily.     sucralfate (CARAFATE) 100 mg/mL suspension Take 5 mL by mouth four (4) times daily. No current facility-administered medications for this visit.

## 2020-05-27 DIAGNOSIS — F41.9 ANXIETY: ICD-10-CM

## 2020-05-27 RX ORDER — ALPRAZOLAM 0.25 MG/1
TABLET ORAL
Qty: 90 TAB | Refills: 0 | Status: SHIPPED | OUTPATIENT
Start: 2020-05-27 | End: 2020-07-17 | Stop reason: SDUPTHER

## 2020-07-17 ENCOUNTER — HOSPITAL ENCOUNTER (OUTPATIENT)
Dept: LAB | Age: 55
Discharge: HOME | End: 2020-07-17

## 2020-07-17 ENCOUNTER — OFFICE VISIT (OUTPATIENT)
Dept: INTERNAL MEDICINE CLINIC | Age: 55
End: 2020-07-17

## 2020-07-17 VITALS
OXYGEN SATURATION: 97 % | HEIGHT: 69 IN | BODY MASS INDEX: 30.72 KG/M2 | DIASTOLIC BLOOD PRESSURE: 80 MMHG | RESPIRATION RATE: 14 BRPM | WEIGHT: 207.4 LBS | HEART RATE: 75 BPM | TEMPERATURE: 98.9 F | SYSTOLIC BLOOD PRESSURE: 122 MMHG

## 2020-07-17 DIAGNOSIS — L70.0 CYSTIC ACNE: ICD-10-CM

## 2020-07-17 DIAGNOSIS — E53.8 VITAMIN B12 DEFICIENCY: ICD-10-CM

## 2020-07-17 DIAGNOSIS — K21.00 GASTROESOPHAGEAL REFLUX DISEASE WITH ESOPHAGITIS: ICD-10-CM

## 2020-07-17 DIAGNOSIS — E29.1 MALE HYPOGONADISM: ICD-10-CM

## 2020-07-17 DIAGNOSIS — Z12.5 SCREENING PSA (PROSTATE SPECIFIC ANTIGEN): ICD-10-CM

## 2020-07-17 DIAGNOSIS — Z23 ENCOUNTER FOR IMMUNIZATION: ICD-10-CM

## 2020-07-17 DIAGNOSIS — F41.9 ANXIETY: ICD-10-CM

## 2020-07-17 DIAGNOSIS — E87.6 HYPOKALEMIA: ICD-10-CM

## 2020-07-17 DIAGNOSIS — Z72.51 HIGH RISK SEXUAL BEHAVIOR, UNSPECIFIED TYPE: ICD-10-CM

## 2020-07-17 DIAGNOSIS — E55.9 VITAMIN D DEFICIENCY: ICD-10-CM

## 2020-07-17 DIAGNOSIS — R03.0 BORDERLINE HYPERTENSION: ICD-10-CM

## 2020-07-17 DIAGNOSIS — N64.4 BREAST PAIN: ICD-10-CM

## 2020-07-17 DIAGNOSIS — M19.049 HAND ARTHRITIS: ICD-10-CM

## 2020-07-17 DIAGNOSIS — E78.00 PURE HYPERCHOLESTEROLEMIA: Primary | ICD-10-CM

## 2020-07-17 DIAGNOSIS — E78.00 PURE HYPERCHOLESTEROLEMIA: ICD-10-CM

## 2020-07-17 DIAGNOSIS — N52.9 ERECTILE DYSFUNCTION, UNSPECIFIED ERECTILE DYSFUNCTION TYPE: ICD-10-CM

## 2020-07-17 LAB
25(OH)D3 SERPL-MCNC: 12.8 NG/ML (ref 30–100)
ALBUMIN SERPL-MCNC: 4.1 G/DL (ref 3.5–5)
ALBUMIN/GLOB SERPL: 1.2 {RATIO} (ref 1.1–2.2)
ALP SERPL-CCNC: 78 U/L (ref 45–117)
ALT SERPL-CCNC: 35 U/L (ref 12–78)
ANION GAP SERPL CALC-SCNC: 9 MMOL/L (ref 5–15)
AST SERPL-CCNC: 24 U/L (ref 15–37)
BILIRUB SERPL-MCNC: 1.1 MG/DL (ref 0.2–1)
BUN SERPL-MCNC: 12 MG/DL (ref 6–20)
BUN/CREAT SERPL: 9 (ref 12–20)
CALCIUM SERPL-MCNC: 9 MG/DL (ref 8.5–10.1)
CHLORIDE SERPL-SCNC: 101 MMOL/L (ref 97–108)
CHOLEST SERPL-MCNC: 222 MG/DL
CO2 SERPL-SCNC: 28 MMOL/L (ref 21–32)
CREAT SERPL-MCNC: 1.39 MG/DL (ref 0.7–1.3)
EST. AVERAGE GLUCOSE BLD GHB EST-MCNC: 111 MG/DL
GLOBULIN SER CALC-MCNC: 3.4 G/DL (ref 2–4)
GLUCOSE SERPL-MCNC: 84 MG/DL (ref 65–100)
HBA1C MFR BLD: 5.5 % (ref 4–5.6)
HDLC SERPL-MCNC: 38 MG/DL
HDLC SERPL: 5.8 {RATIO} (ref 0–5)
HIV 1+2 AB+HIV1 P24 AG SERPL QL IA: NONREACTIVE
HIV12 RESULT COMMENT, HHIVC: NORMAL
LDLC SERPL CALC-MCNC: 141 MG/DL (ref 0–100)
LIPID PROFILE,FLP: ABNORMAL
POTASSIUM SERPL-SCNC: 3.8 MMOL/L (ref 3.5–5.1)
PROT SERPL-MCNC: 7.5 G/DL (ref 6.4–8.2)
SODIUM SERPL-SCNC: 138 MMOL/L (ref 136–145)
TRIGL SERPL-MCNC: 215 MG/DL (ref ?–150)
VIT B12 SERPL-MCNC: 298 PG/ML (ref 193–986)
VLDLC SERPL CALC-MCNC: 43 MG/DL

## 2020-07-17 RX ORDER — OXYCODONE AND ACETAMINOPHEN 10; 325 MG/1; MG/1
1 TABLET ORAL
Qty: 2 TAB | Refills: 0 | Status: SHIPPED | OUTPATIENT
Start: 2020-07-17 | End: 2020-07-18

## 2020-07-17 RX ORDER — IBUPROFEN 600 MG/1
600 TABLET ORAL
Qty: 90 TAB | Refills: 4 | Status: SHIPPED | OUTPATIENT
Start: 2020-07-17 | End: 2021-01-27 | Stop reason: ALTCHOICE

## 2020-07-17 RX ORDER — HYDROCHLOROTHIAZIDE 25 MG/1
TABLET ORAL
Qty: 90 TAB | Refills: 3 | Status: SHIPPED | OUTPATIENT
Start: 2020-07-17 | End: 2021-01-27 | Stop reason: SDUPTHER

## 2020-07-17 RX ORDER — ALPRAZOLAM 0.25 MG/1
TABLET ORAL
Qty: 90 TAB | Refills: 1 | Status: SHIPPED | OUTPATIENT
Start: 2020-07-17 | End: 2021-01-27 | Stop reason: SDUPTHER

## 2020-07-17 RX ORDER — TESTOSTERONE CYPIONATE 200 MG/ML
200 INJECTION INTRAMUSCULAR
Qty: 10 ML | Refills: 5 | Status: SHIPPED | OUTPATIENT
Start: 2020-07-17 | End: 2021-01-27 | Stop reason: SDUPTHER

## 2020-07-17 RX ORDER — SILDENAFIL CITRATE 20 MG/1
TABLET ORAL
Qty: 90 TAB | Refills: 5 | Status: SHIPPED | OUTPATIENT
Start: 2020-07-17 | End: 2021-01-27 | Stop reason: SDUPTHER

## 2020-07-17 RX ORDER — PRAVASTATIN SODIUM 20 MG/1
20 TABLET ORAL
Qty: 90 TAB | Refills: 3 | Status: SHIPPED | OUTPATIENT
Start: 2020-07-17 | End: 2021-01-27 | Stop reason: SDUPTHER

## 2020-07-17 RX ORDER — SPIRONOLACTONE 100 MG/1
100 TABLET, FILM COATED ORAL DAILY
Qty: 90 TAB | Refills: 3 | Status: SHIPPED | OUTPATIENT
Start: 2020-07-17 | End: 2021-01-27 | Stop reason: SDUPTHER

## 2020-07-17 RX ORDER — PANTOPRAZOLE SODIUM 40 MG/1
40 TABLET, DELAYED RELEASE ORAL DAILY
Qty: 90 TAB | Refills: 3 | Status: SHIPPED | OUTPATIENT
Start: 2020-07-17 | End: 2021-01-27 | Stop reason: SDUPTHER

## 2020-07-17 RX ORDER — CLINDAMYCIN PHOSPHATE 11.9 MG/ML
SOLUTION TOPICAL
Qty: 60 ML | Refills: 5 | Status: SHIPPED | OUTPATIENT
Start: 2020-07-17 | End: 2021-01-27 | Stop reason: SDUPTHER

## 2020-07-17 NOTE — PROGRESS NOTES
HISTORY OF PRESENT ILLNESS    Chief Complaint   Patient presents with    Hypertension    Cholesterol Problem    Immunization/Injection     2nd hep a        Presents for follow-up    Needs 2nd Hep A vaccine due to neg Ab. Hypertension  Hypertension ROS: taking medications as instructed, no medication side effects noted, no TIA's, no chest pain on exertion, no dyspnea on exertion, no swelling of ankles     reports that he quit smoking about 13 years ago. He has a 20.00 pack-year smoking history. He quit smokeless tobacco use about 13 years ago. reports current alcohol use. BP Readings from Last 2 Encounters:   07/17/20 122/80   01/17/20 140/80      Hyperlipidemia  Currently he takes pravastatin 20 mg  ROS: taking medications as instructed, no medication side effects noted  No new myalgias, no joint pains, no weakness  No TIA's, no chest pain on exertion, no dyspnea on exertion, no swelling of ankles. Lab Results   Component Value Date/Time    Cholesterol, total 222 (H) 07/17/2020 02:00 PM    HDL Cholesterol 38 07/17/2020 02:00 PM    LDL, calculated 141 (H) 07/17/2020 02:00 PM    VLDL, calculated 43 07/17/2020 02:00 PM    Triglyceride 215 (H) 07/17/2020 02:00 PM    CHOL/HDL Ratio 5.8 (H) 07/17/2020 02:00 PM     Anxiety. Taking xanax prn, sometimes daily. then does not take for 2 weeks. Did not fill last rx. Last was #90 5/29/20      Review of Systems   All other systems reviewed and are negative, except as noted in HPI    Past Medical and Surgical History   has a past medical history of Anxiety, Borderline hypertension, Cystic acne, Diverticulitis of colon (7/2014), Eczema, GERD (gastroesophageal reflux disease), Hyperlipidemia, Insomnia, Male hypogonadism (2012), Skin abscess, Tubular adenoma (11/2013), Vitamin B12 deficiency, and Vitamin D deficiency.    has a past surgical history that includes hx cholecystectomy (2005); hx colonoscopy (11/7/13); pr breast surgery procedure unlisted (Left, 12/2017); hx heent (Bilateral, 2007); and hx breast biopsy (Left, 1/16/2018). reports that he quit smoking about 13 years ago. He has a 20.00 pack-year smoking history. He quit smokeless tobacco use about 13 years ago. He reports current alcohol use. He reports current drug use. Drug: Marijuana. family history includes Cancer in his mother; Hypertension in his mother and sister. Physical Exam   Nursing note and vitals reviewed. Blood pressure 122/80, pulse 75, temperature 98.9 °F (37.2 °C), temperature source Oral, resp. rate 14, height 5' 9\" (1.753 m), weight 207 lb 6.4 oz (94.1 kg), SpO2 97 %. Constitutional:  No distress. Eyes: Conjunctivae are normal.   Ears:  Hearing grossly intact  Cardiovascular: Normal rate. regular rhythm, no murmurs or gallops  No edema  Pulmonary/Chest: Effort normal.   CTAB  Musculoskeletal: moves all 4 extremities   Neurological: Alert and oriented to person, place, and time. Skin: No rash noted. Psychiatric: Normal mood and affect. Behavior is normal.     Diagnoses and all orders for this visit:    1. Pure hypercholesterolemia  based on my history, the overall control of this problem borderline controlled. -     LIPID PANEL; Future  -     pravastatin (PravachoL) 20 mg tablet; Take 1 Tab by mouth nightly. 2. Borderline hypertension  Controlled on current regimen. Continue current medications as written in chart.  -     HEMOGLOBIN A1C WITH EAG; Future  -     METABOLIC PANEL, COMPREHENSIVE; Future  -     hydroCHLOROthiazide (HYDRODIURIL) 25 mg tablet; TAKE 1 TABLET BY MOUTH ONCE DAILY FOR EDEMA. 3. Encounter for immunization  -     HEPATITIS A VACCINE, ADULT DOSAGE, IM  -     [place] IMMUNIZ ADMIN,1 SINGLE/COMB VAC/TOXOID    4. Hand arthritis  -     ibuprofen (MOTRIN) 600 mg tablet; Take 1 Tab by mouth every eight (8) hours as needed for Pain. 5. Male hypogonadism  Controlled on current regimen. Continue current medications as written in chart.   Per specialist  - ESTROGENS, TOTAL; Future  -     testosterone cypionate (DEPOTESTOTERONE CYPIONATE) 200 mg/mL injection; 1 mL by IntraMUSCular route every fourteen (14) days. Max Daily Amount: 200 mg. Please dispense 10 ml bottle    6. Vitamin B12 deficiency  -     VITAMIN B12; Future    7. Vitamin D deficiency  -     VITAMIN D, 25 HYDROXY; Future    8. High risk sexual behavior, unspecified type  -     BLOOD TYPE, (ABO+RH); Future  -     HIV 1/2 AG/AB, 4TH GENERATION,W RFLX CONFIRM; Future    9. Screening PSA (prostate specific antigen)  -     PSA W/ REFLX FREE PSA; Future    10. Breast pain- piercing pending  -     oxyCODONE-acetaminophen (PERCOCET 10)  mg per tablet; Take 1 Tab by mouth every four (4) hours as needed for Pain for up to 2 doses. Max Daily Amount: 6 Tabs. For pain from piercing #2    11. Anxiety  Controlled on current regimen. Continue current medications as written in chart.  profile was accessed online for Mushtaq Lee and reviewed by me during this encounter. I did not see evidence of inappropriate or suspicious controlled substance prescription activity.  -     ALPRAZolam (XANAX) 0.25 mg tablet; Take 1/2 pill in AM and 1 pill nightly as needed  Indications: anxious    12. Hypokalemia  -     spironolactone (ALDACTONE) 100 mg tablet; Take 1 Tab by mouth daily. DO NOT TAKE WITH POTASSIUM    13. Gastroesophageal reflux disease with esophagitis  -     pantoprazole (Protonix) 40 mg tablet; Take 1 Tab by mouth daily. 14. Cystic acne  Controlled on current regimen. Continue current medications as written in chart. -     spironolactone (ALDACTONE) 100 mg tablet; Take 1 Tab by mouth daily. DO NOT TAKE WITH POTASSIUM  -     clindamycin (CLEOCIN T) 1 % external solution; use thin film on affected area    15. Erectile dysfunction, unspecified erectile dysfunction type  -     sildenafiL, pulmonary hypertension, (REVATIO) 20 mg tablet;  Take 3-5 pills once daily as needed          lab results and schedule of future lab studies reviewed with patient  reviewed medications and side effects in detail    Return to clinic for further evaluation if new symptoms develop        Current Outpatient Medications   Medication Sig    ibuprofen (MOTRIN) 600 mg tablet Take 1 Tab by mouth every eight (8) hours as needed for Pain.  ALPRAZolam (XANAX) 0.25 mg tablet Take 1/2 pill in AM and 1 pill nightly as needed  Indications: anxious    hydroCHLOROthiazide (HYDRODIURIL) 25 mg tablet TAKE 1 TABLET BY MOUTH ONCE DAILY FOR EDEMA.  spironolactone (ALDACTONE) 100 mg tablet Take 1 Tab by mouth daily. DO NOT TAKE WITH POTASSIUM    pantoprazole (Protonix) 40 mg tablet Take 1 Tab by mouth daily.  pravastatin (PravachoL) 20 mg tablet Take 1 Tab by mouth nightly.  testosterone cypionate (DEPOTESTOTERONE CYPIONATE) 200 mg/mL injection 1 mL by IntraMUSCular route every fourteen (14) days. Max Daily Amount: 200 mg. Please dispense 10 ml bottle    clindamycin (CLEOCIN T) 1 % external solution use thin film on affected area    sildenafiL, pulmonary hypertension, (REVATIO) 20 mg tablet Take 3-5 pills once daily as needed    TRUVADA 200-300 mg per tablet Take 1 Tab by mouth daily. No current facility-administered medications for this visit.

## 2020-07-18 LAB
ABO + RH BLD: NORMAL
BLOOD BANK CMNT PATIENT-IMP: NORMAL
PSA SERPL-MCNC: 0.9 NG/ML (ref 0–4)
REFLEX CRITERIA: NORMAL

## 2020-07-21 LAB — ESTROGEN SERPL-MCNC: 120 PG/ML (ref 40–115)

## 2021-01-27 ENCOUNTER — OFFICE VISIT (OUTPATIENT)
Dept: INTERNAL MEDICINE CLINIC | Age: 56
End: 2021-01-27
Payer: COMMERCIAL

## 2021-01-27 VITALS
HEART RATE: 75 BPM | HEIGHT: 69 IN | DIASTOLIC BLOOD PRESSURE: 77 MMHG | RESPIRATION RATE: 14 BRPM | OXYGEN SATURATION: 98 % | BODY MASS INDEX: 28.94 KG/M2 | SYSTOLIC BLOOD PRESSURE: 113 MMHG | WEIGHT: 195.4 LBS | TEMPERATURE: 98.4 F

## 2021-01-27 DIAGNOSIS — N52.9 ERECTILE DYSFUNCTION, UNSPECIFIED ERECTILE DYSFUNCTION TYPE: ICD-10-CM

## 2021-01-27 DIAGNOSIS — K21.00 GASTROESOPHAGEAL REFLUX DISEASE WITH ESOPHAGITIS: ICD-10-CM

## 2021-01-27 DIAGNOSIS — E78.00 PURE HYPERCHOLESTEROLEMIA: ICD-10-CM

## 2021-01-27 DIAGNOSIS — R03.0 BORDERLINE HYPERTENSION: ICD-10-CM

## 2021-01-27 DIAGNOSIS — E87.6 HYPOKALEMIA: ICD-10-CM

## 2021-01-27 DIAGNOSIS — E55.9 VITAMIN D DEFICIENCY: ICD-10-CM

## 2021-01-27 DIAGNOSIS — E29.1 MALE HYPOGONADISM: ICD-10-CM

## 2021-01-27 DIAGNOSIS — F41.9 ANXIETY: ICD-10-CM

## 2021-01-27 DIAGNOSIS — M79.645 PAIN OF LEFT MIDDLE FINGER: Primary | ICD-10-CM

## 2021-01-27 DIAGNOSIS — L70.0 CYSTIC ACNE: ICD-10-CM

## 2021-01-27 DIAGNOSIS — N28.9 RENAL INSUFFICIENCY: ICD-10-CM

## 2021-01-27 PROCEDURE — 99214 OFFICE O/P EST MOD 30 MIN: CPT | Performed by: INTERNAL MEDICINE

## 2021-01-27 RX ORDER — DEXTROMETHORPHAN HYDROBROMIDE, GUAIFENESIN 5; 100 MG/5ML; MG/5ML
650 LIQUID ORAL EVERY 8 HOURS
Qty: 120 TAB | Refills: 3 | Status: SHIPPED | OUTPATIENT
Start: 2021-01-27 | End: 2022-06-09 | Stop reason: SDUPTHER

## 2021-01-27 RX ORDER — HYDROCHLOROTHIAZIDE 12.5 MG/1
12.5 TABLET ORAL DAILY
Qty: 90 TAB | Refills: 1 | Status: SHIPPED | OUTPATIENT
Start: 2021-01-27 | End: 2022-02-03 | Stop reason: SDUPTHER

## 2021-01-27 RX ORDER — DIAZEPAM 5 MG/1
TABLET ORAL
COMMUNITY
Start: 2020-12-03 | End: 2022-06-09 | Stop reason: ALTCHOICE

## 2021-01-27 RX ORDER — TESTOSTERONE CYPIONATE 200 MG/ML
200 INJECTION INTRAMUSCULAR
Qty: 10 ML | Refills: 5 | Status: SHIPPED | OUTPATIENT
Start: 2021-01-27 | End: 2021-04-29

## 2021-01-27 RX ORDER — CLINDAMYCIN PHOSPHATE 11.9 MG/ML
SOLUTION TOPICAL
Qty: 60 ML | Refills: 5 | Status: SHIPPED | OUTPATIENT
Start: 2021-01-27 | End: 2021-09-20 | Stop reason: SDUPTHER

## 2021-01-27 RX ORDER — SPIRONOLACTONE 100 MG/1
100 TABLET, FILM COATED ORAL DAILY
Qty: 90 TAB | Refills: 1 | Status: SHIPPED | OUTPATIENT
Start: 2021-01-27 | End: 2021-08-09

## 2021-01-27 RX ORDER — ALPRAZOLAM 0.25 MG/1
TABLET ORAL
Qty: 90 TAB | Refills: 1 | Status: SHIPPED | OUTPATIENT
Start: 2021-01-27 | End: 2021-08-16

## 2021-01-27 RX ORDER — PRAVASTATIN SODIUM 20 MG/1
20 TABLET ORAL
Qty: 90 TAB | Refills: 3 | Status: SHIPPED | OUTPATIENT
Start: 2021-01-27 | End: 2021-09-20 | Stop reason: SDUPTHER

## 2021-01-27 RX ORDER — SILDENAFIL CITRATE 20 MG/1
TABLET ORAL
Qty: 90 TAB | Refills: 5 | Status: SHIPPED | OUTPATIENT
Start: 2021-01-27 | End: 2021-09-21 | Stop reason: ALTCHOICE

## 2021-01-27 RX ORDER — PANTOPRAZOLE SODIUM 40 MG/1
40 TABLET, DELAYED RELEASE ORAL DAILY
Qty: 90 TAB | Refills: 3 | Status: SHIPPED | OUTPATIENT
Start: 2021-01-27 | End: 2021-09-20 | Stop reason: SDUPTHER

## 2021-01-27 NOTE — PROGRESS NOTES
HISTORY OF PRESENT ILLNESS    Chief Complaint   Patient presents with    Medication Evaluation    Hand Swelling     Left middle finger. Somtimes pain and radiates up to arm. Presents for follow-up  He is exercising often. Stopped ibuprofen due to CKD. Asks for repeat renal rxn. Labs done at [facility] on December 16 showed creatinine 1.7, this was increased in the summer. He is taking hydrochlorothiazide and spironolactone. Lab Results   Component Value Date/Time    Creatinine 1.39 (H) 07/17/2020 02:00 PM     Hypertension  Hypertension ROS: taking medications as instructed, no medication side effects noted, no TIA's, no chest pain on exertion, no dyspnea on exertion, no swelling of ankles     reports that he quit smoking about 14 years ago. He has a 20.00 pack-year smoking history. He quit smokeless tobacco use about 14 years ago. reports current alcohol use. BP Readings from Last 2 Encounters:   01/27/21 113/77   07/17/20 122/80       Left middle finger pain. Using glove. He cannot make a fist with this finger. History of previous injury. Hyperlipidemia  Currently he takes pravastatin 20 mg  ROS: taking medications as instructed, no medication side effects noted  No new myalgias, no joint pains, no weakness  No TIA's, no chest pain on exertion, no dyspnea on exertion, no swelling of ankles.    Lab Results   Component Value Date/Time    Cholesterol, total 222 (H) 07/17/2020 02:00 PM    HDL Cholesterol 38 07/17/2020 02:00 PM    LDL, calculated 141 (H) 07/17/2020 02:00 PM    VLDL, calculated 43 07/17/2020 02:00 PM    Triglyceride 215 (H) 07/17/2020 02:00 PM    CHOL/HDL Ratio 5.8 (H) 07/17/2020 02:00 PM           Review of Systems   All other systems reviewed and are negative, except as noted in HPI    Past Medical and Surgical History   has a past medical history of Anxiety, Borderline hypertension, Cystic acne, Diverticulitis of colon (7/2014), Eczema, GERD (gastroesophageal reflux disease), Hyperlipidemia, Insomnia, Male hypogonadism (2012), Skin abscess, Tubular adenoma (11/2013), Vitamin B12 deficiency, and Vitamin D deficiency. has a past surgical history that includes hx cholecystectomy (2005); hx colonoscopy (11/7/13); pr breast surgery procedure unlisted (Left, 12/2017); hx heent (Bilateral, 2007); and hx breast biopsy (Left, 1/16/2018). reports that he quit smoking about 14 years ago. He has a 20.00 pack-year smoking history. He quit smokeless tobacco use about 14 years ago. He reports current alcohol use. He reports current drug use. Drug: Marijuana. family history includes Cancer in his mother; Hypertension in his mother and sister. Physical Exam   Nursing note and vitals reviewed. Blood pressure 113/77, pulse 75, temperature 98.4 °F (36.9 °C), temperature source Oral, resp. rate 14, height 5' 9\" (1.753 m), weight 195 lb 6.4 oz (88.6 kg), SpO2 98 %. Constitutional:  No distress. Eyes: Conjunctivae are normal.   Ears:  Hearing grossly intact  Cardiovascular: Normal rate. regular rhythm, no murmurs or gallops  No edema  Pulmonary/Chest: Effort normal.   CTAB  Musculoskeletal: moves all 4 extremities   Middle finger PIP with thickening and decreased flexion. Neurological: Alert and oriented to person, place, and time. Skin: No visible rash noted. Psychiatric: Normal mood and affect. Behavior is normal.     ASSESSMENT and PLAN  Diagnoses and all orders for this visit:    1. Pain of left middle finger he does have some functional impairment here. I do recommend hand surgery evaluation. Trial of acetaminophen. -     REFERRAL TO ORTHOPEDICS    2. Renal insufficiency  Avoid NSAIDs as he is doing. .  I also recommend that he decrease his hydrochlorothiazide. He is currently taking spironolactone. We will need to monitor closely. -     METABOLIC PANEL, BASIC; Future    3. Borderline hypertension  Pressure is very well controlled. Decrease hydrochlorothiazide.   May not even need to take this daily. Taking spironolactone for cystic acne. -     hydroCHLOROthiazide (HYDRODIURIL) 12.5 mg tablet; Take 1 Tab by mouth daily. Decreased 1/27/21    4. Pure hypercholesterolemia   Unclear control on pravastatin. -     pravastatin (PravachoL) 20 mg tablet; Take 1 Tab by mouth nightly. 5. Vitamin D deficiency  -     VITAMIN D, 25 HYDROXY; Future    6. Anxiety   Reasonably controlled. Continue alprazolam very sparingly.  reviewed. No other providers.  -     ALPRAZolam (XANAX) 0.25 mg tablet; Take 1/2 pill in AM and 1 pill nightly as needed  Indications: anxious    7. Cystic acne  Reasonably well controlled on current regimen. -     clindamycin (CLEOCIN T) 1 % external solution; use thin film on affected area  -     spironolactone (ALDACTONE) 100 mg tablet; Take 1 Tab by mouth daily. DO NOT TAKE WITH POTASSIUM    8. Gastroesophageal reflux disease with esophagitis  -     pantoprazole (Protonix) 40 mg tablet; Take 1 Tab by mouth daily. 9. Erectile dysfunction, unspecified erectile dysfunction type  -     sildenafiL, pulmonary hypertension, (REVATIO) 20 mg tablet; Take 3-5 pills once daily as needed    10. Hypokalemia  Likely controlled on spironolactone. Refill medication. -     spironolactone (ALDACTONE) 100 mg tablet; Take 1 Tab by mouth daily. DO NOT TAKE WITH POTASSIUM    11. Male hypogonadism  He may decide to decrease his dose to 100 mg weekly if he wants to give injections weekly. -     testosterone cypionate (DEPOTESTOTERONE CYPIONATE) 200 mg/mL injection; 1 mL by IntraMUSCular route every fourteen (14) days. Max Daily Amount: 200 mg. Please dispense 10 ml bottle  -     PSA W/ REFLX FREE PSA; Future    Other orders  -     acetaminophen (Tylenol Arthritis Pain) 650 mg TbER; Take 1 Tab by mouth every eight (8) hours.         There are no Patient Instructions on file for this visit.    lab results and schedule of future lab studies reviewed with patient  reviewed medications and side effects in detail    Return to clinic for further evaluation if new symptoms develop        Current Outpatient Medications   Medication Sig    diazePAM (VALIUM) 5 mg tablet TAKE 1 TABLET BY MOUTH 1 HOUR PRIOR TO BEDTIME FOR TMJ    acetaminophen (Tylenol Arthritis Pain) 650 mg TbER Take 1 Tab by mouth every eight (8) hours.  ALPRAZolam (XANAX) 0.25 mg tablet Take 1/2 pill in AM and 1 pill nightly as needed  Indications: anxious    clindamycin (CLEOCIN T) 1 % external solution use thin film on affected area    pantoprazole (Protonix) 40 mg tablet Take 1 Tab by mouth daily.  pravastatin (PravachoL) 20 mg tablet Take 1 Tab by mouth nightly.  sildenafiL, pulmonary hypertension, (REVATIO) 20 mg tablet Take 3-5 pills once daily as needed    spironolactone (ALDACTONE) 100 mg tablet Take 1 Tab by mouth daily. DO NOT TAKE WITH POTASSIUM    testosterone cypionate (DEPOTESTOTERONE CYPIONATE) 200 mg/mL injection 1 mL by IntraMUSCular route every fourteen (14) days. Max Daily Amount: 200 mg. Please dispense 10 ml bottle    hydroCHLOROthiazide (HYDRODIURIL) 12.5 mg tablet Take 1 Tab by mouth daily. Decreased 1/27/21    TRUVADA 200-300 mg per tablet Take 1 Tab by mouth daily. No current facility-administered medications for this visit.

## 2021-01-30 LAB
25(OH)D3+25(OH)D2 SERPL-MCNC: 7.2 NG/ML (ref 30–100)
BUN SERPL-MCNC: 11 MG/DL (ref 6–24)
BUN/CREAT SERPL: 7 (ref 9–20)
CALCIUM SERPL-MCNC: 10.1 MG/DL (ref 8.7–10.2)
CHLORIDE SERPL-SCNC: 99 MMOL/L (ref 96–106)
CO2 SERPL-SCNC: 22 MMOL/L (ref 20–29)
CREAT SERPL-MCNC: 1.49 MG/DL (ref 0.76–1.27)
GLUCOSE SERPL-MCNC: 82 MG/DL (ref 65–99)
INTERPRETATION: NORMAL
POTASSIUM SERPL-SCNC: 4.1 MMOL/L (ref 3.5–5.2)
PSA SERPL-MCNC: 0.8 NG/ML (ref 0–4)
REFLEX CRITERIA: NORMAL
SODIUM SERPL-SCNC: 138 MMOL/L (ref 134–144)

## 2021-02-01 RX ORDER — ASPIRIN 325 MG
50000 TABLET, DELAYED RELEASE (ENTERIC COATED) ORAL
Qty: 4 CAP | Refills: 5 | Status: SHIPPED | OUTPATIENT
Start: 2021-02-01 | End: 2021-05-17 | Stop reason: SDUPTHER

## 2021-05-17 RX ORDER — ASPIRIN 325 MG
50000 TABLET, DELAYED RELEASE (ENTERIC COATED) ORAL
Qty: 4 CAP | Refills: 5 | Status: SHIPPED | OUTPATIENT
Start: 2021-05-17 | End: 2021-08-25 | Stop reason: SDUPTHER

## 2021-08-16 DIAGNOSIS — F41.9 ANXIETY: ICD-10-CM

## 2021-08-16 RX ORDER — ALPRAZOLAM 0.25 MG/1
TABLET ORAL
Qty: 45 TABLET | Refills: 0 | Status: SHIPPED | OUTPATIENT
Start: 2021-08-16 | End: 2021-09-20 | Stop reason: SDUPTHER

## 2021-08-25 RX ORDER — ASPIRIN 325 MG
50000 TABLET, DELAYED RELEASE (ENTERIC COATED) ORAL
Qty: 4 CAPSULE | Refills: 5 | Status: SHIPPED | OUTPATIENT
Start: 2021-08-25 | End: 2021-09-20 | Stop reason: SDUPTHER

## 2021-09-03 ENCOUNTER — TELEPHONE (OUTPATIENT)
Dept: INTERNAL MEDICINE CLINIC | Age: 56
End: 2021-09-03

## 2021-09-03 NOTE — TELEPHONE ENCOUNTER
Submitted requested clinical information to [facility] via CoverMyMeds for patient's Pantoprazole 40mg prescription to initiate pre-auth. Final determination of pre-auth is pending clinical review.  Jaskaran Sethi, [address]: [id number], RX: [phone]

## 2021-09-09 ENCOUNTER — TELEPHONE (OUTPATIENT)
Dept: INTERNAL MEDICINE CLINIC | Age: 56
End: 2021-09-09

## 2021-09-09 NOTE — TELEPHONE ENCOUNTER
Rectramaine'd denial letter via fax from [facility] for patient's Pantoprazole 40mg prescription for a 90 day supply. Nurse noted that patient has been on this same medication since 2017 with success & without any previous insurance denial issues. Nurse did not note any insurance plan changes between 2017 & the present, so call made to [facility] pre-auth dept to request a re-determination of pre-auth on the patient's Pantoprazole. Nurse spoke with a representative named Sherryle Presser who was agreeable to reprocessing the pre-auth as this is continuation therapy & in the past patient has tried other non-camber pantoprazole products & failed. Pre-auth clinicals were updated by [facility] has now approved the patient's Pantoprazole 40mg tablets, but only for 30 tablets at a time. Sherryle Presser will have an updated approval letter faxed to PCP's office, so it can be forwarded to patient's pharmacy to have the prescription filled. New pre-auth is good until 9/9/2022 ([id number]). Length of pre-auth appeal call was approx 15 minutes.  Thank you-

## 2021-09-20 ENCOUNTER — OFFICE VISIT (OUTPATIENT)
Dept: INTERNAL MEDICINE CLINIC | Age: 56
End: 2021-09-20
Payer: COMMERCIAL

## 2021-09-20 VITALS
BODY MASS INDEX: 29.41 KG/M2 | HEART RATE: 94 BPM | WEIGHT: 198.6 LBS | DIASTOLIC BLOOD PRESSURE: 77 MMHG | RESPIRATION RATE: 14 BRPM | HEIGHT: 69 IN | OXYGEN SATURATION: 96 % | TEMPERATURE: 98.2 F | SYSTOLIC BLOOD PRESSURE: 126 MMHG

## 2021-09-20 DIAGNOSIS — Z12.11 SCREEN FOR COLON CANCER: ICD-10-CM

## 2021-09-20 DIAGNOSIS — N52.9 ERECTILE DYSFUNCTION, UNSPECIFIED ERECTILE DYSFUNCTION TYPE: ICD-10-CM

## 2021-09-20 DIAGNOSIS — K21.00 GASTROESOPHAGEAL REFLUX DISEASE WITH ESOPHAGITIS: ICD-10-CM

## 2021-09-20 DIAGNOSIS — E53.8 VITAMIN B12 DEFICIENCY: ICD-10-CM

## 2021-09-20 DIAGNOSIS — K57.32 DIVERTICULITIS OF COLON: ICD-10-CM

## 2021-09-20 DIAGNOSIS — R03.0 BORDERLINE HYPERTENSION: ICD-10-CM

## 2021-09-20 DIAGNOSIS — F41.9 ANXIETY: ICD-10-CM

## 2021-09-20 DIAGNOSIS — L70.0 CYSTIC ACNE: Primary | ICD-10-CM

## 2021-09-20 DIAGNOSIS — E55.9 VITAMIN D DEFICIENCY: ICD-10-CM

## 2021-09-20 DIAGNOSIS — E78.00 PURE HYPERCHOLESTEROLEMIA: ICD-10-CM

## 2021-09-20 DIAGNOSIS — E29.1 MALE HYPOGONADISM: ICD-10-CM

## 2021-09-20 PROCEDURE — 99214 OFFICE O/P EST MOD 30 MIN: CPT | Performed by: INTERNAL MEDICINE

## 2021-09-20 RX ORDER — PRAVASTATIN SODIUM 20 MG/1
20 TABLET ORAL
Qty: 90 TABLET | Refills: 1 | Status: SHIPPED | OUTPATIENT
Start: 2021-09-20 | End: 2022-06-09 | Stop reason: SDUPTHER

## 2021-09-20 RX ORDER — ALPRAZOLAM 0.25 MG/1
TABLET ORAL
Qty: 45 TABLET | Refills: 5 | Status: SHIPPED | OUTPATIENT
Start: 2021-09-20 | End: 2022-06-09 | Stop reason: SDUPTHER

## 2021-09-20 RX ORDER — PANTOPRAZOLE SODIUM 40 MG/1
40 TABLET, DELAYED RELEASE ORAL DAILY
Qty: 90 TABLET | Refills: 1 | Status: SHIPPED | OUTPATIENT
Start: 2021-09-20 | End: 2022-04-14 | Stop reason: SDUPTHER

## 2021-09-20 RX ORDER — ASPIRIN 325 MG
50000 TABLET, DELAYED RELEASE (ENTERIC COATED) ORAL
Qty: 13 CAPSULE | Refills: 1 | Status: SHIPPED | OUTPATIENT
Start: 2021-09-20 | End: 2022-06-09 | Stop reason: SDUPTHER

## 2021-09-20 RX ORDER — SILDENAFIL 100 MG/1
100 TABLET, FILM COATED ORAL
Qty: 90 TABLET | Refills: 1 | Status: SHIPPED | OUTPATIENT
Start: 2021-09-20 | End: 2022-06-09 | Stop reason: SDUPTHER

## 2021-09-20 RX ORDER — CLINDAMYCIN PHOSPHATE 11.9 MG/ML
SOLUTION TOPICAL
Qty: 60 ML | Refills: 5 | Status: SHIPPED | OUTPATIENT
Start: 2021-09-20 | End: 2022-06-09 | Stop reason: SDUPTHER

## 2021-09-20 RX ORDER — HYDROCODONE BITARTRATE AND ACETAMINOPHEN 7.5; 325 MG/1; MG/1
1 TABLET ORAL
Qty: 15 TABLET | Refills: 0 | Status: SHIPPED | OUTPATIENT
Start: 2021-09-20 | End: 2021-09-25

## 2021-09-20 NOTE — PROGRESS NOTES
HISTORY OF PRESENT ILLNESS    Chief Complaint   Patient presents with    Medication Refill       Presents for follow-up  Says he is overall feeling much better. Believes that taking vitamin D has helped some of his arthritic pains. Cystic acne is reasonably well controlled with clindamycin gel. Is not taking any oral antibiotics. He would like a refill. He is having some lesions on his back and chest but prefers no additional therapies at this time. Hypogonadism. He is taking testosterone injections every 14 days. Feels his energy levels are fairly good. Libido and erectile function are well controlled with Viagra. Hypertension  Hypertension ROS: taking medications as instructed, no medication side effects noted, no TIA's, no chest pain on exertion, no dyspnea on exertion, no swelling of ankles     reports that he quit smoking about 14 years ago. He has a 20.00 pack-year smoking history. He quit smokeless tobacco use about 14 years ago. reports current alcohol use. Blood pressure 126/77, pulse 94, temperature 98.2 °F (36.8 °C), temperature source Oral, resp. rate 14, height 5' 9\" (1.753 m), weight 198 lb 9.6 oz (90.1 kg), SpO2 96 %. Hyperlipidemia  Currently he takes pravastatin 20 mg  ROS: taking medications as instructed, no medication side effects noted  No new myalgias, no joint pains, no weakness  No TIA's, no chest pain on exertion, no dyspnea on exertion, no swelling of ankles.    Lab Results   Component Value Date/Time    Cholesterol, total 222 (H) 07/17/2020 02:00 PM    HDL Cholesterol 38 07/17/2020 02:00 PM    LDL, calculated 141 (H) 07/17/2020 02:00 PM    VLDL, calculated 43 07/17/2020 02:00 PM    Triglyceride 215 (H) 07/17/2020 02:00 PM    CHOL/HDL Ratio 5.8 (H) 07/17/2020 02:00 PM         Review of Systems   All other systems reviewed and are negative, except as noted in HPI    Past Medical and Surgical History   has a past medical history of Anxiety, Borderline hypertension, Cystic acne, Diverticulitis of colon (7/2014), Eczema, GERD (gastroesophageal reflux disease), Hyperlipidemia, Insomnia, Male hypogonadism (2012), Right sided sciatica (2021), Skin abscess, Tubular adenoma (11/2013), Vitamin B12 deficiency, and Vitamin D deficiency. has a past surgical history that includes hx cholecystectomy (2005); hx colonoscopy (11/7/13); pr breast surgery procedure unlisted (Left, 12/2017); hx heent (Bilateral, 2007); and hx breast biopsy (Left, 1/16/2018). reports that he quit smoking about 14 years ago. He has a 20.00 pack-year smoking history. He quit smokeless tobacco use about 14 years ago. He reports current alcohol use. He reports current drug use. Drug: Marijuana. family history includes Cancer in his mother; Hypertension in his mother and sister. Physical Exam   Nursing note and vitals reviewed. Blood pressure 126/77, pulse 94, temperature 98.2 °F (36.8 °C), temperature source Oral, resp. rate 14, height 5' 9\" (1.753 m), weight 198 lb 9.6 oz (90.1 kg), SpO2 96 %. Constitutional:  No distress. Eyes: Conjunctivae are normal.   Ears:  Hearing grossly intact  Cardiovascular: Normal rate. regular rhythm, no murmurs or gallops  No edema  Pulmonary/Chest: Effort normal.   CTAB  Musculoskeletal: moves all 4 extremities   Middle finger PIP with thickening and decreased flexion. Neurological: Alert and oriented to person, place, and time. Skin: No visible rash noted. Psychiatric: Normal mood and affect. Behavior is normal.     Diagnoses and all orders for this visit:    1. Cystic acne  Facial and scalp lesions are controlled. Chest and back could use better control. Consider oral therapy but he declines right now. -     clindamycin (CLEOCIN T) 1 % external solution; use thin film on affected area    2. Vitamin B12 deficiency  Doing well on supplemental therapy. 3. Vitamin D deficiency  Taking supplemental therapy and feeling well.   -     VITAMIN D, 25 HYDROXY; Future  -     cholecalciferol (VITAMIN D3) (50,000 UNITS /1250 MCG) capsule; Take 1 Capsule by mouth every seven (7) days. 4. Pure hypercholesterolemia  This condition is controlled on current medication regimen as written in medication list.  Medications refilled. \  -     LIPID PANEL; Future  -     pravastatin (PravachoL) 20 mg tablet; Take 1 Tablet by mouth nightly. 5. Anxiety  Currently asymptomatic  -     ALPRAZolam (XANAX) 0.25 mg tablet; TAKE 1/2 (ONE-HALF) TABLET BY MOUTH ONCE DAILY IN THE MORNING AND 1 TABLET NIGHTLY AS NEEDED    6. Gastroesophageal reflux disease with esophagitis  Currently asymptomatic  This condition is controlled on current medication regimen as written in medication list.  Medications refilled. -     pantoprazole (Protonix) 40 mg tablet; Take 1 Tablet by mouth daily. 7. Male hypogonadism  Doing well. Check labs 10 to 14 days after his last injection.  -     PSA W/ REFLX FREE PSA; Future  -     TESTOSTERONE, TOTAL, ADULT MALE; Future  -     CBC W/O DIFF; Future    8. Borderline hypertension  This condition is controlled on current medication regimen as written in medication list.  Medications refilled. -     METABOLIC PANEL, BASIC; Future    9. Diverticulitis of colon  Currently asymptomatic  -     HYDROcodone-acetaminophen (NORCO) 7.5-325 mg per tablet; Take 1 Tablet by mouth every eight (8) hours as needed for Pain for up to 5 days. Max Daily Amount: 3 Tablets. 10. Screen for colon cancer  -     REFERRAL TO GASTROENTEROLOGY    11. Erectile dysfunction, unspecified erectile dysfunction type  -     sildenafil citrate (VIAGRA) 100 mg tablet; Take 1 Tablet by mouth daily as needed for Erectile Dysfunction.  Indications: the inability to have an erection      lab results and schedule of future lab studies reviewed with patient  reviewed medications and side effects in detail    Return to clinic for further evaluation if new symptoms develop        Current Outpatient Medications   Medication Sig    clindamycin (CLEOCIN T) 1 % external solution use thin film on affected area    HYDROcodone-acetaminophen (NORCO) 7.5-325 mg per tablet Take 1 Tablet by mouth every eight (8) hours as needed for Pain for up to 5 days. Max Daily Amount: 3 Tablets.  ALPRAZolam (XANAX) 0.25 mg tablet TAKE 1/2 (ONE-HALF) TABLET BY MOUTH ONCE DAILY IN THE MORNING AND 1 TABLET NIGHTLY AS NEEDED    pantoprazole (Protonix) 40 mg tablet Take 1 Tablet by mouth daily.  pravastatin (PravachoL) 20 mg tablet Take 1 Tablet by mouth nightly.  sildenafil citrate (VIAGRA) 100 mg tablet Take 1 Tablet by mouth daily as needed for Erectile Dysfunction. Indications: the inability to have an erection    cholecalciferol (VITAMIN D3) (50,000 UNITS /1250 MCG) capsule Take 1 Capsule by mouth every seven (7) days.  spironolactone (ALDACTONE) 100 mg tablet TAKE 1 TABLET BY MOUTH ONCE DAILY (DO  NOT  TAKE  WITH  POTASSIUM)    testosterone cypionate (DEPOTESTOTERONE CYPIONATE) 200 mg/mL injection INJECT 1 ML (CC) INTRAMUSCULARLY EVERY 14 DAYS. MAXIMUM DAILY AMOUNT 200MG.  diazePAM (VALIUM) 5 mg tablet TAKE 1 TABLET BY MOUTH 1 HOUR PRIOR TO BEDTIME FOR TMJ    acetaminophen (Tylenol Arthritis Pain) 650 mg TbER Take 1 Tab by mouth every eight (8) hours.  hydroCHLOROthiazide (HYDRODIURIL) 12.5 mg tablet Take 1 Tab by mouth daily. Decreased 1/27/21    TRUVADA 200-300 mg per tablet Take 1 Tab by mouth daily. No current facility-administered medications for this visit.

## 2021-09-21 DIAGNOSIS — E29.1 MALE HYPOGONADISM: ICD-10-CM

## 2021-09-21 RX ORDER — TESTOSTERONE CYPIONATE 200 MG/ML
INJECTION INTRAMUSCULAR
Qty: 10 ML | Refills: 1 | Status: SHIPPED | OUTPATIENT
Start: 2021-09-21 | End: 2022-06-09 | Stop reason: SDUPTHER

## 2021-09-22 LAB
25(OH)D3+25(OH)D2 SERPL-MCNC: 64.9 NG/ML (ref 30–100)
BUN SERPL-MCNC: 10 MG/DL (ref 6–24)
BUN/CREAT SERPL: 7 (ref 9–20)
CALCIUM SERPL-MCNC: 9.4 MG/DL (ref 8.7–10.2)
CHLORIDE SERPL-SCNC: 104 MMOL/L (ref 96–106)
CHOLEST SERPL-MCNC: 189 MG/DL (ref 100–199)
CO2 SERPL-SCNC: 23 MMOL/L (ref 20–29)
CREAT SERPL-MCNC: 1.46 MG/DL (ref 0.76–1.27)
ERYTHROCYTE [DISTWIDTH] IN BLOOD BY AUTOMATED COUNT: 13.5 % (ref 11.6–15.4)
GLUCOSE SERPL-MCNC: 89 MG/DL (ref 65–99)
HCT VFR BLD AUTO: 49.4 % (ref 37.5–51)
HDLC SERPL-MCNC: 48 MG/DL
HGB BLD-MCNC: 17.2 G/DL (ref 13–17.7)
IMP & REVIEW OF LAB RESULTS: NORMAL
INTERPRETATION: NORMAL
LDLC SERPL CALC-MCNC: 126 MG/DL (ref 0–99)
MCH RBC QN AUTO: 35.3 PG (ref 26.6–33)
MCHC RBC AUTO-ENTMCNC: 34.8 G/DL (ref 31.5–35.7)
MCV RBC AUTO: 101 FL (ref 79–97)
PLATELET # BLD AUTO: 163 X10E3/UL (ref 150–450)
POTASSIUM SERPL-SCNC: 4.2 MMOL/L (ref 3.5–5.2)
PSA SERPL-MCNC: 1.1 NG/ML (ref 0–4)
RBC # BLD AUTO: 4.87 X10E6/UL (ref 4.14–5.8)
REFLEX CRITERIA: NORMAL
SODIUM SERPL-SCNC: 142 MMOL/L (ref 134–144)
TESTOST SERPL-MCNC: 697 NG/DL (ref 264–916)
TRIGL SERPL-MCNC: 83 MG/DL (ref 0–149)
VLDLC SERPL CALC-MCNC: 15 MG/DL (ref 5–40)
WBC # BLD AUTO: 7.5 X10E3/UL (ref 3.4–10.8)

## 2022-03-11 RX ORDER — CIPROFLOXACIN 500 MG/1
500 TABLET ORAL 2 TIMES DAILY
Qty: 14 TABLET | Refills: 0 | Status: SHIPPED | OUTPATIENT
Start: 2022-03-11 | End: 2022-06-09 | Stop reason: ALTCHOICE

## 2022-03-11 RX ORDER — METRONIDAZOLE 500 MG/1
500 TABLET ORAL 3 TIMES DAILY
Qty: 21 TABLET | Refills: 0 | Status: SHIPPED | OUTPATIENT
Start: 2022-03-11 | End: 2022-06-09 | Stop reason: ALTCHOICE

## 2022-04-14 DIAGNOSIS — K21.00 GASTROESOPHAGEAL REFLUX DISEASE WITH ESOPHAGITIS: ICD-10-CM

## 2022-04-14 RX ORDER — PANTOPRAZOLE SODIUM 40 MG/1
40 TABLET, DELAYED RELEASE ORAL DAILY
Qty: 90 TABLET | Refills: 1 | Status: SHIPPED | OUTPATIENT
Start: 2022-04-14 | End: 2022-06-09 | Stop reason: SDUPTHER

## 2022-04-27 ENCOUNTER — TELEPHONE (OUTPATIENT)
Dept: INTERNAL MEDICINE CLINIC | Age: 57
End: 2022-04-27

## 2022-04-27 NOTE — TELEPHONE ENCOUNTER
Spoke with patient and he reports that the issue has already been taking care of. Grateful for the rtc.

## 2022-04-27 NOTE — TELEPHONE ENCOUNTER
----- Message from THE [facility] sent at 4/27/2022 10:19 AM EDT -----  Subject: Message to Provider    QUESTIONS  Information for Provider? pt requesting callback to verify ins card with   [employer] bronze [phone] plan is acceptable.  ---------------------------------------------------------------------------  --------------  CALL BACK INFO  What is the best way for the office to contact you? OK to leave message on   voicemail, OK to respond with electronic message via [email or web address] portal (only   for patients who have registered [email or web address] account)  Preferred Call Back Phone Number?  [phone]  ---------------------------------------------------------------------------  --------------  SCRIPT ANSWERS  undefined

## 2022-06-09 ENCOUNTER — OFFICE VISIT (OUTPATIENT)
Dept: INTERNAL MEDICINE CLINIC | Age: 57
End: 2022-06-09
Payer: COMMERCIAL

## 2022-06-09 VITALS
SYSTOLIC BLOOD PRESSURE: 106 MMHG | OXYGEN SATURATION: 95 % | DIASTOLIC BLOOD PRESSURE: 66 MMHG | RESPIRATION RATE: 16 BRPM | TEMPERATURE: 98.4 F | HEIGHT: 69 IN | WEIGHT: 197.2 LBS | HEART RATE: 68 BPM | BODY MASS INDEX: 29.21 KG/M2

## 2022-06-09 DIAGNOSIS — K21.00 GASTROESOPHAGEAL REFLUX DISEASE WITH ESOPHAGITIS: ICD-10-CM

## 2022-06-09 DIAGNOSIS — E55.9 VITAMIN D DEFICIENCY: ICD-10-CM

## 2022-06-09 DIAGNOSIS — E87.6 HYPOKALEMIA: ICD-10-CM

## 2022-06-09 DIAGNOSIS — N52.9 ERECTILE DYSFUNCTION, UNSPECIFIED ERECTILE DYSFUNCTION TYPE: ICD-10-CM

## 2022-06-09 DIAGNOSIS — E53.8 VITAMIN B12 DEFICIENCY: ICD-10-CM

## 2022-06-09 DIAGNOSIS — L70.0 CYSTIC ACNE: ICD-10-CM

## 2022-06-09 DIAGNOSIS — E29.1 MALE HYPOGONADISM: ICD-10-CM

## 2022-06-09 DIAGNOSIS — F41.9 ANXIETY: ICD-10-CM

## 2022-06-09 DIAGNOSIS — R03.0 BORDERLINE HYPERTENSION: ICD-10-CM

## 2022-06-09 DIAGNOSIS — E78.00 PURE HYPERCHOLESTEROLEMIA: Primary | ICD-10-CM

## 2022-06-09 PROCEDURE — 99213 OFFICE O/P EST LOW 20 MIN: CPT | Performed by: INTERNAL MEDICINE

## 2022-06-09 RX ORDER — HYDROCHLOROTHIAZIDE 12.5 MG/1
12.5 TABLET ORAL DAILY
Qty: 90 TABLET | Refills: 1 | Status: SHIPPED | OUTPATIENT
Start: 2022-06-09

## 2022-06-09 RX ORDER — PRAVASTATIN SODIUM 20 MG/1
20 TABLET ORAL
Qty: 90 TABLET | Refills: 1 | Status: SHIPPED | OUTPATIENT
Start: 2022-06-09

## 2022-06-09 RX ORDER — TESTOSTERONE CYPIONATE 200 MG/ML
INJECTION INTRAMUSCULAR
Qty: 10 ML | Refills: 1 | Status: SHIPPED | OUTPATIENT
Start: 2022-06-09

## 2022-06-09 RX ORDER — ALPRAZOLAM 0.25 MG/1
TABLET ORAL
Qty: 45 TABLET | Refills: 5 | Status: SHIPPED | OUTPATIENT
Start: 2022-06-09

## 2022-06-09 RX ORDER — ASPIRIN 325 MG
50000 TABLET, DELAYED RELEASE (ENTERIC COATED) ORAL
Qty: 13 CAPSULE | Refills: 1 | Status: SHIPPED | OUTPATIENT
Start: 2022-06-09 | End: 2022-09-06

## 2022-06-09 RX ORDER — SILDENAFIL 100 MG/1
100 TABLET, FILM COATED ORAL
Qty: 90 TABLET | Refills: 1 | Status: SHIPPED | OUTPATIENT
Start: 2022-06-09 | End: 2022-09-10

## 2022-06-09 RX ORDER — SPIRONOLACTONE 100 MG/1
TABLET, FILM COATED ORAL
Qty: 90 TABLET | Refills: 1 | Status: SHIPPED | OUTPATIENT
Start: 2022-06-09

## 2022-06-09 RX ORDER — CLINDAMYCIN PHOSPHATE 11.9 MG/ML
SOLUTION TOPICAL
Qty: 60 ML | Refills: 5 | Status: SHIPPED | OUTPATIENT
Start: 2022-06-09

## 2022-06-09 RX ORDER — DEXTROMETHORPHAN HYDROBROMIDE, GUAIFENESIN 5; 100 MG/5ML; MG/5ML
650 LIQUID ORAL EVERY 8 HOURS
Qty: 120 TABLET | Refills: 3 | Status: SHIPPED | OUTPATIENT
Start: 2022-06-09

## 2022-06-09 RX ORDER — PANTOPRAZOLE SODIUM 40 MG/1
40 TABLET, DELAYED RELEASE ORAL DAILY
Qty: 90 TABLET | Refills: 1 | Status: SHIPPED | OUTPATIENT
Start: 2022-06-09

## 2022-06-09 NOTE — PROGRESS NOTES
HISTORY OF PRESENT ILLNESS    Chief Complaint   Patient presents with    Medication Refill       Presents for follow-up. He has an [employer] that we do not accept, but is reluctant to change PCP. I offered to try to minimize billing is much as possible. Working as a counselor. Doing well. Not swinging due to COVID. Stopped Truvada. Cystic acne. Taking spironolactone for prevention. He has a lesion on his left cheek which he would like me to duyen. Using clindamycin. Hyperlipidemia  Currently he takes pravastatin 20 mg  ROS: taking medications as instructed, no medication side effects noted  No new myalgias, no joint pains, no weakness  No TIA's, no chest pain on exertion, no dyspnea on exertion, no swelling of ankles. Lab Results   Component Value Date/Time    Cholesterol, total 235 (H) 06/09/2022 03:54 PM    HDL Cholesterol 44 06/09/2022 03:54 PM    LDL, calculated 150 (H) 06/09/2022 03:54 PM    LDL, calculated 141 (H) 07/17/2020 02:00 PM    VLDL, calculated 41 (H) 06/09/2022 03:54 PM    VLDL, calculated 43 07/17/2020 02:00 PM    Triglyceride 225 (H) 06/09/2022 03:54 PM    CHOL/HDL Ratio 5.8 (H) 07/17/2020 02:00 PM     Hypertension  Hypertension ROS: taking medications as instructed, no medication side effects noted, no TIA's, no chest pain on exertion, no dyspnea on exertion, no swelling of ankles     reports that he quit smoking about 15 years ago. He has a 20.00 pack-year smoking history. He quit smokeless tobacco use about 15 years ago. reports current alcohol use. BP Readings from Last 2 Encounters:   06/09/22 106/66   09/20/21 126/77       Anxiety  Patient is seen for anxiety disorder. Current treatment includes Xanax and no other therapies. Ongoing symptoms include: none. Patient denies: sweating, chest pain, dizziness, paresthesias, racing thoughts, feelings of losing control, difficulty concentrating, suicidal ideation. Denies substance or alcohol abuse.  Reported side effects from the treatment: none. Hypogonadism. Is using testosterone injections every 14 days. Tolerating well. GERD is well controlled with pantoprazole. Erectile dysfunction controlled with Viagra. Review of Systems   All other systems reviewed and are negative, except as noted in HPI    Past Medical and Surgical History   has a past medical history of Anxiety, Borderline hypertension, Cystic acne, Diverticulitis of colon (7/2014), Eczema, GERD (gastroesophageal reflux disease), Hyperlipidemia, Insomnia, Male hypogonadism (2012), Right sided sciatica (2021), Skin abscess, Tubular adenoma (11/2013), Vitamin B12 deficiency, and Vitamin D deficiency. has a past surgical history that includes hx cholecystectomy (2005); hx colonoscopy (11/7/13); pr breast surgery procedure unlisted (Left, 12/2017); hx heent (Bilateral, 2007); and hx breast biopsy (Left, 1/16/2018). reports that he quit smoking about 15 years ago. He has a 20.00 pack-year smoking history. He quit smokeless tobacco use about 15 years ago. He reports current alcohol use. He reports current drug use. Drug: Marijuana. family history includes Cancer in his mother; Hypertension in his mother and sister. Physical Exam   Nursing note and vitals reviewed. Blood pressure 106/66, pulse 68, temperature 98.4 °F (36.9 °C), temperature source Oral, resp. rate 16, height 5' 9\" (1.753 m), weight 197 lb 3.2 oz (89.4 kg), SpO2 95 %. Constitutional:  No distress. Eyes: Conjunctivae are normal.   Ears:  Hearing grossly intact  Cardiovascular: Normal rate. regular rhythm, no murmurs or gallops  No edema  Pulmonary/Chest: Effort normal.   CTAB  Musculoskeletal: moves all 4 extremities   Neurological: Alert and oriented to person, place, and time. Skin: No visible rash noted. Left cheek 1 cm nodular cyst.  Psychiatric: Normal mood and affect.  Behavior is normal.     Assessment and Plan    Diagnoses and all orders for this visit:    This is a level 5 visit which I have intentionally underbilled as a level 3 due to lack of insurance acceptance. 1. Pure hypercholesterolemia  This condition is controlled on current medication regimen as written in medication list.  Medications refilled. -     pravastatin (PravachoL) 20 mg tablet; Take 1 Tablet by mouth nightly. -     LIPID PANEL; Future    2. Vitamin D deficiency  Continue vitamin D supplementation. -     cholecalciferol (VITAMIN D3) (50,000 UNITS /1250 MCG) capsule; Take 1 Capsule by mouth every seven (7) days. -     CBC W/O DIFF; Future  -     VITAMIN D, 25 HYDROXY; Future    3. Male hypogonadism  Symptomatically doing well on testosterone injections. Check CBC, PSA.  -     testosterone cypionate (DEPOTESTOTERONE CYPIONATE) 200 mg/mL injection; INJECT 1 ML (CC) INTRAMUSCULARLY EVERY 14 DAYS. MAXIMUM DAILY AMOUNT 200MG. -     PSA W/ REFLX FREE PSA; Future    4. Cystic acne  Using a sterile scalpel, I was able to express contents of cyst of left cheek including some of the core  -     clindamycin (CLEOCIN T) 1 % external solution; use thin film on affected area  -     spironolactone (ALDACTONE) 100 mg tablet; TAKE 1 TABLET BY MOUTH ONCE DAILY (DO  NOT  TAKE  WITH  POTASSIUM)    5. Vitamin B12 deficiency  Taking B12 at this time. -     VITAMIN B12; Future    6. Erectile dysfunction, unspecified erectile dysfunction type  This condition is controlled on current medication regimen as written in medication list.  Medications refilled. -     sildenafil citrate (VIAGRA) 100 mg tablet; Take 1 Tablet by mouth daily as needed for Erectile Dysfunction. Indications: the inability to have an erection    7. Hypokalemia  Secondary to hydrochlorothiazide, continue spironolactone. -     spironolactone (ALDACTONE) 100 mg tablet; TAKE 1 TABLET BY MOUTH ONCE DAILY (DO  NOT  TAKE  WITH  POTASSIUM)  -     METABOLIC PANEL, COMPREHENSIVE; Future    8.  Borderline hypertension  This condition is controlled on current medication regimen as written in medication list.  Medications refilled. -     hydroCHLOROthiazide (HYDRODIURIL) 12.5 mg tablet; Take 1 Tablet by mouth daily. Decreased 1/27/21    9. Anxiety  -     ALPRAZolam (XANAX) 0.25 mg tablet; TAKE 1/2 (ONE-HALF) TABLET BY MOUTH ONCE DAILY IN THE MORNING AND 1 TABLET NIGHTLY AS NEEDED  This condition is controlled on current medication regimen as written in medication list.  Medications refilled.  profile was accessed online for Kieran Cabot and reviewed by me during this encounter. I did not see evidence of inappropriate or suspicious controlled substance prescription activity. 10. Gastroesophageal reflux disease with esophagitis  This condition is controlled on current medication regimen as written in medication list.  Medications refilled. -     pantoprazole (Protonix) 40 mg tablet; Take 1 Tablet by mouth daily. lab results and schedule of future lab studies reviewed with patient  reviewed medications and side effects in detail    Return to clinic for further evaluation if new symptoms develop        Current Outpatient Medications   Medication Sig    sildenafil citrate (VIAGRA) 100 mg tablet Take 1 Tablet by mouth daily as needed for Erectile Dysfunction. Indications: the inability to have an erection    cholecalciferol (VITAMIN D3) (50,000 UNITS /1250 MCG) capsule Take 1 Capsule by mouth every seven (7) days.  acetaminophen (Tylenol Arthritis Pain) 650 mg TbER Take 1 Tablet by mouth every eight (8) hours.  pravastatin (PravachoL) 20 mg tablet Take 1 Tablet by mouth nightly.  clindamycin (CLEOCIN T) 1 % external solution use thin film on affected area    spironolactone (ALDACTONE) 100 mg tablet TAKE 1 TABLET BY MOUTH ONCE DAILY (DO  NOT  TAKE  WITH  POTASSIUM)    hydroCHLOROthiazide (HYDRODIURIL) 12.5 mg tablet Take 1 Tablet by mouth daily.  Decreased 1/27/21    testosterone cypionate (DEPOTESTOTERONE CYPIONATE) 200 mg/mL injection INJECT 1 ML (CC) INTRAMUSCULARLY EVERY 14 DAYS. MAXIMUM DAILY AMOUNT 200MG.  ALPRAZolam (XANAX) 0.25 mg tablet TAKE 1/2 (ONE-HALF) TABLET BY MOUTH ONCE DAILY IN THE MORNING AND 1 TABLET NIGHTLY AS NEEDED    pantoprazole (Protonix) 40 mg tablet Take 1 Tablet by mouth daily. No current facility-administered medications for this visit.

## 2022-06-10 LAB
25(OH)D3+25(OH)D2 SERPL-MCNC: 70.4 NG/ML (ref 30–100)
ALBUMIN SERPL-MCNC: 4.7 G/DL (ref 3.8–4.9)
ALBUMIN/GLOB SERPL: 1.7 {RATIO} (ref 1.2–2.2)
ALP SERPL-CCNC: 66 IU/L (ref 44–121)
ALT SERPL-CCNC: 18 IU/L (ref 0–44)
AST SERPL-CCNC: 20 IU/L (ref 0–40)
BILIRUB SERPL-MCNC: 0.4 MG/DL (ref 0–1.2)
BUN SERPL-MCNC: 9 MG/DL (ref 6–24)
BUN/CREAT SERPL: 7 (ref 9–20)
CALCIUM SERPL-MCNC: 9.8 MG/DL (ref 8.7–10.2)
CHLORIDE SERPL-SCNC: 97 MMOL/L (ref 96–106)
CHOLEST SERPL-MCNC: 235 MG/DL (ref 100–199)
CO2 SERPL-SCNC: 23 MMOL/L (ref 20–29)
CREAT SERPL-MCNC: 1.25 MG/DL (ref 0.76–1.27)
EGFR: 67 ML/MIN/1.73
ERYTHROCYTE [DISTWIDTH] IN BLOOD BY AUTOMATED COUNT: 14.5 % (ref 11.6–15.4)
GLOBULIN SER CALC-MCNC: 2.7 G/DL (ref 1.5–4.5)
GLUCOSE SERPL-MCNC: 89 MG/DL (ref 65–99)
HCT VFR BLD AUTO: 51.6 % (ref 37.5–51)
HDLC SERPL-MCNC: 44 MG/DL
HGB BLD-MCNC: 16.8 G/DL (ref 13–17.7)
IMP & REVIEW OF LAB RESULTS: NORMAL
LDLC SERPL CALC-MCNC: 150 MG/DL (ref 0–99)
MCH RBC QN AUTO: 33.1 PG (ref 26.6–33)
MCHC RBC AUTO-ENTMCNC: 32.6 G/DL (ref 31.5–35.7)
MCV RBC AUTO: 102 FL (ref 79–97)
PLATELET # BLD AUTO: 174 X10E3/UL (ref 150–450)
POTASSIUM SERPL-SCNC: 4.1 MMOL/L (ref 3.5–5.2)
PROT SERPL-MCNC: 7.4 G/DL (ref 6–8.5)
PSA SERPL-MCNC: 0.6 NG/ML (ref 0–4)
RBC # BLD AUTO: 5.08 X10E6/UL (ref 4.14–5.8)
REFLEX CRITERIA: NORMAL
SODIUM SERPL-SCNC: 139 MMOL/L (ref 134–144)
TRIGL SERPL-MCNC: 225 MG/DL (ref 0–149)
VIT B12 SERPL-MCNC: 336 PG/ML (ref 232–1245)
VLDLC SERPL CALC-MCNC: 41 MG/DL (ref 5–40)
WBC # BLD AUTO: 6.9 X10E3/UL (ref 3.4–10.8)

## 2022-08-25 RX ORDER — AMOXICILLIN AND CLAVULANATE POTASSIUM 875; 125 MG/1; MG/1
1 TABLET, FILM COATED ORAL 2 TIMES DAILY
Qty: 20 TABLET | Refills: 0 | Status: SHIPPED | OUTPATIENT
Start: 2022-08-25 | End: 2022-09-04

## 2022-09-06 DIAGNOSIS — E55.9 VITAMIN D DEFICIENCY: ICD-10-CM

## 2022-09-06 RX ORDER — ASPIRIN 325 MG
TABLET, DELAYED RELEASE (ENTERIC COATED) ORAL
Qty: 13 CAPSULE | Refills: 0 | Status: SHIPPED | OUTPATIENT
Start: 2022-09-06

## 2022-11-28 ENCOUNTER — TELEPHONE (OUTPATIENT)
Dept: INTERNAL MEDICINE CLINIC | Age: 57
End: 2022-11-28

## 2022-11-28 NOTE — TELEPHONE ENCOUNTER
Spoke with patient and advised that provider is not available on 12/22/22 and he agreed to reschedule for 12/28/22 at 1:20 PM.  Grateful for the call.

## 2022-12-04 DIAGNOSIS — K21.00 GASTROESOPHAGEAL REFLUX DISEASE WITH ESOPHAGITIS: ICD-10-CM

## 2022-12-04 DIAGNOSIS — E55.9 VITAMIN D DEFICIENCY: ICD-10-CM

## 2022-12-04 DIAGNOSIS — R03.0 BORDERLINE HYPERTENSION: ICD-10-CM

## 2022-12-04 DIAGNOSIS — E87.6 HYPOKALEMIA: ICD-10-CM

## 2022-12-04 DIAGNOSIS — L70.0 CYSTIC ACNE: ICD-10-CM

## 2022-12-04 DIAGNOSIS — E78.00 PURE HYPERCHOLESTEROLEMIA: ICD-10-CM

## 2022-12-04 RX ORDER — ASPIRIN 325 MG
TABLET, DELAYED RELEASE (ENTERIC COATED) ORAL
Qty: 13 CAPSULE | Refills: 0 | Status: SHIPPED | OUTPATIENT
Start: 2022-12-04

## 2022-12-04 RX ORDER — PRAVASTATIN SODIUM 20 MG/1
20 TABLET ORAL
Qty: 90 TABLET | Refills: 0 | Status: SHIPPED | OUTPATIENT
Start: 2022-12-04

## 2022-12-04 RX ORDER — PANTOPRAZOLE SODIUM 40 MG/1
40 TABLET, DELAYED RELEASE ORAL DAILY
Qty: 90 TABLET | Refills: 0 | Status: SHIPPED | OUTPATIENT
Start: 2022-12-04

## 2022-12-04 RX ORDER — SPIRONOLACTONE 100 MG/1
TABLET, FILM COATED ORAL
Qty: 90 TABLET | Refills: 0 | Status: SHIPPED | OUTPATIENT
Start: 2022-12-04

## 2022-12-04 RX ORDER — HYDROCHLOROTHIAZIDE 12.5 MG/1
TABLET ORAL
Qty: 90 TABLET | Refills: 0 | Status: SHIPPED | OUTPATIENT
Start: 2022-12-04

## 2022-12-28 ENCOUNTER — OFFICE VISIT (OUTPATIENT)
Dept: INTERNAL MEDICINE CLINIC | Age: 57
End: 2022-12-28
Payer: COMMERCIAL

## 2022-12-28 VITALS
DIASTOLIC BLOOD PRESSURE: 88 MMHG | WEIGHT: 201.4 LBS | BODY MASS INDEX: 29.83 KG/M2 | SYSTOLIC BLOOD PRESSURE: 135 MMHG | HEART RATE: 67 BPM | TEMPERATURE: 97.6 F | HEIGHT: 69 IN | RESPIRATION RATE: 18 BRPM | OXYGEN SATURATION: 94 %

## 2022-12-28 DIAGNOSIS — E29.1 MALE HYPOGONADISM: ICD-10-CM

## 2022-12-28 DIAGNOSIS — F41.9 ANXIETY: ICD-10-CM

## 2022-12-28 DIAGNOSIS — L70.0 CYSTIC ACNE: Primary | ICD-10-CM

## 2022-12-28 DIAGNOSIS — E78.00 PURE HYPERCHOLESTEROLEMIA: ICD-10-CM

## 2022-12-28 DIAGNOSIS — S63.501A SPRAIN OF RIGHT WRIST, INITIAL ENCOUNTER: ICD-10-CM

## 2022-12-28 DIAGNOSIS — S76.211A INGUINAL STRAIN, RIGHT, INITIAL ENCOUNTER: ICD-10-CM

## 2022-12-28 PROCEDURE — 99213 OFFICE O/P EST LOW 20 MIN: CPT | Performed by: INTERNAL MEDICINE

## 2022-12-28 RX ORDER — DEXTROMETHORPHAN HYDROBROMIDE, GUAIFENESIN 5; 100 MG/5ML; MG/5ML
650 LIQUID ORAL EVERY 8 HOURS
Qty: 120 TABLET | Refills: 3 | Status: SHIPPED | OUTPATIENT
Start: 2022-12-28

## 2022-12-28 RX ORDER — ALPRAZOLAM 0.25 MG/1
TABLET ORAL
Qty: 45 TABLET | Refills: 5 | Status: SHIPPED | OUTPATIENT
Start: 2022-12-28

## 2022-12-28 RX ORDER — TESTOSTERONE CYPIONATE 200 MG/ML
INJECTION INTRAMUSCULAR
Qty: 10 ML | Refills: 1 | Status: SHIPPED | OUTPATIENT
Start: 2022-12-28

## 2022-12-28 NOTE — PROGRESS NOTES
Room:  Identified pt with two pt identifiers(name and ). Reviewed record in preparation for visit and have obtained necessary documentation. Chief Complaint   Patient presents with    Cholesterol Problem        Vitals:    22 1255   BP: (!) 136/91   Pulse: 67   Resp: 18   Temp: 97.6 °F (36.4 °C)   TempSrc: Temporal   SpO2: 94%   Weight: 201 lb 6.4 oz (91.4 kg)   Height: 5' 9\" (1.753 m)   PainSc:   0 - No pain       Health Maintenance Due   Topic    DTaP/Tdap/Td series (1 - Tdap)    Shingles Vaccine (1 of 2)    Colorectal Cancer Screening Combo     COVID-19 Vaccine (4 - Booster for Pfizer series)    Flu Vaccine (1)       1. \"Have you been to the ER, urgent care clinic since your last visit? Hospitalized since your last visit? \" No    2. \"Have you seen or consulted any other health care providers outside of the [address] [place] [address] since your last visit? \" No     3. For patients over 45: Has the patient had a colonoscopy? Yes - no Care Gap present     If the patient is female:    4. For patients over 36: Has the patient had a mammogram? NA - based on age    11. For patients over 21: Has the patient had a pap smear?  NA - based on age    Current Outpatient Medications   Medication Instructions    acetaminophen (TYLENOL ARTHRITIS PAIN) 650 mg, Oral, EVERY 8 HOURS    ALPRAZolam (XANAX) 0.25 mg tablet TAKE 1/2 (ONE-HALF) TABLET BY MOUTH ONCE DAILY IN THE MORNING AND 1 TABLET NIGHTLY AS NEEDED    cholecalciferol (VITAMIN D3) (50,000 UNITS /1250 MCG) capsule TAKE 1 CAPSULE BY MOUTH EVERY 7 DAYS    clindamycin (CLEOCIN T) 1 % external solution use thin film on affected area    hydroCHLOROthiazide (HYDRODIURIL) 12.5 mg tablet Take 1 tablet by mouth daily    pantoprazole (PROTONIX) 40 mg, Oral, DAILY    pravastatin (PRAVACHOL) 20 mg, Oral, EVERY BEDTIME    sildenafil citrate (VIAGRA) 100 mg tablet TAKE 1 TABLET BY MOUTH DAILY AS NEEDED FOR ERECTILE DYSFUNCTION    spironolactone (ALDACTONE) 100 mg tablet TAKE 1 TABLET BY MOUTH ONCE DAILY. DO NOT TAKE WITH POTASSIUM    testosterone cypionate (DEPOTESTOTERONE CYPIONATE) 200 mg/mL injection INJECT 1 ML (CC) INTRAMUSCULARLY EVERY 14 DAYS. MAXIMUM DAILY AMOUNT 200MG. Allergies   Allergen Reactions    Sulfa (Sulfonamide Antibiotics) Other (comments)     Head of penis swelling and burning with urination       Immunization History   Administered Date(s) Administered    COVID-19, PFIZER PURPLE top, DILUTE for use, (age 15 y+), IM, 30mcg/0.3mL 01/15/2021, 02/05/2021, 10/22/2021    Hep A Vaccine (Adult) 01/17/2020, 07/17/2020       Past Medical History:   Diagnosis Date    Anxiety     Borderline hypertension     Cystic acne     Diverticulitis of colon 7/2014    Eczema     Dr. Ever Robles    GERD (gastroesophageal reflux disease)     Hyperlipidemia     10/2014 chol 198, ldl 140, hdl 43    Insomnia     Male hypogonadism 2012    Dr. Wendy Lin.   psa 0.8 6/2014, psa 1.0    Right sided sciatica 2021    injection helped    Skin abscess     MSSA 7/2014    Tubular adenoma 11/2013    Vitamin B12 deficiency     Vitamin D deficiency

## 2022-12-29 DIAGNOSIS — N52.9 ERECTILE DYSFUNCTION, UNSPECIFIED ERECTILE DYSFUNCTION TYPE: ICD-10-CM

## 2022-12-29 RX ORDER — SILDENAFIL 100 MG/1
100 TABLET, FILM COATED ORAL
Qty: 90 TABLET | Refills: 0 | Status: SHIPPED | OUTPATIENT
Start: 2022-12-29

## 2023-01-04 NOTE — PROGRESS NOTES
HISTORY OF PRESENT ILLNESS    Chief Complaint   Patient presents with    Cholesterol Problem       Presents for follow-up. He has an [employer] that we do not accept, but is reluctant to change PCP. 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 Not swinging due to COVID. Stopped Truvada. Cystic acne. Taking spironolactone for prevention. He has a lesion on his left cheek which he would like me to duyen again. Using clindamycin gel. Hyperlipidemia  Currently he takes pravastatin 20 mg  ROS: taking medications as instructed, no medication side effects noted  No new myalgias, no joint pains, no weakness  No TIA's, no chest pain on exertion, no dyspnea on exertion, no swelling of ankles. Lab Results   Component Value Date/Time    Cholesterol, total 235 (H) 06/09/2022 03:54 PM    HDL Cholesterol 44 06/09/2022 03:54 PM    LDL, calculated 150 (H) 06/09/2022 03:54 PM    LDL, calculated 141 (H) 07/17/2020 02:00 PM    VLDL, calculated 41 (H) 06/09/2022 03:54 PM    VLDL, calculated 43 07/17/2020 02:00 PM    Triglyceride 225 (H) 06/09/2022 03:54 PM    CHOL/HDL Ratio 5.8 (H) 07/17/2020 02:00 PM     Hypertension  Hypertension ROS: taking medications as instructed, no medication side effects noted, no TIA's, no chest pain on exertion, no dyspnea on exertion, no swelling of ankles     reports that he quit smoking about 15 years ago. His smoking use included cigarettes. He has a 20.00 pack-year smoking history. He quit smokeless tobacco use about 16 years ago. reports current alcohol use. BP Readings from Last 2 Encounters:   12/28/22 135/88   06/09/22 106/66       Anxiety  Patient is seen for anxiety disorder. Current treatment includes Xanax and no other therapies. Ongoing symptoms include: none. Patient denies: sweating, chest pain, dizziness, paresthesias, racing thoughts, feelings of losing control, difficulty concentrating, suicidal ideation.    Denies substance or alcohol abuse. Reported side effects from the treatment: none. Hypogonadism. Is using testosterone injections every 14 days. Tolerating well. Review of Systems   All other systems reviewed and are negative, except as noted in HPI    Past Medical and Surgical History   has a past medical history of Anxiety, Borderline hypertension, Cystic acne, Diverticulitis of colon (7/2014), Eczema, GERD (gastroesophageal reflux disease), Hyperlipidemia, Insomnia, Male hypogonadism (2012), Right sided sciatica (2021), Skin abscess, Tubular adenoma (11/2013), Vitamin B12 deficiency, and Vitamin D deficiency. has a past surgical history that includes hx cholecystectomy (2005); hx colonoscopy (11/7/13); pr unlisted procedure breast (Left, 12/2017); hx heent (Bilateral, 2007); and hx breast biopsy (Left, 1/16/2018). reports that he quit smoking about 15 years ago. His smoking use included cigarettes. He has a 20.00 pack-year smoking history. He quit smokeless tobacco use about 16 years ago. He reports current alcohol use. He reports current drug use. Drug: Marijuana. family history includes Cancer in his mother; Hypertension in his mother and sister. Physical Exam   Nursing note and vitals reviewed. Blood pressure 135/88, pulse 67, temperature 97.6 °F (36.4 °C), temperature source Temporal, resp. rate 18, height 5' 9\" (1.753 m), weight 201 lb 6.4 oz (91.4 kg), SpO2 94 %. Constitutional:  No distress. Eyes: Conjunctivae are normal.   Ears:  Hearing grossly intact  Cardiovascular: Normal rate. regular rhythm, no murmurs or gallops  No edema  Pulmonary/Chest: Effort normal.   CTAB  Musculoskeletal: moves all 4 extremities   Right inguinal region with no palpable hernias or lymphadenopathy. Full range of motion of the right hip. Right wrist with discomfort  Neurological: Alert and oriented to person, place, and time. Skin: No visible rash noted.   Left cheek 1 cm nodular cyst.  Psychiatric: Normal mood and affect. Behavior is normal.     Diagnoses and all orders for this visit:    1. Cystic acne  Using a stable needle, I was able to duyen his left cheek lesion and express the contents including the surrounding skin of the cyst.  Hopefully will not recur. Continue spironolactone. Not infected. 2. Male hypogonadism  Continue testosterone injections. Will monitor CBC, PSA intermittently at next visit within 6 months. -     testosterone cypionate (DEPOTESTOTERONE CYPIONATE) 200 mg/mL injection; INJECT 1 ML (CC) INTRAMUSCULARLY EVERY 14 DAYS. MAXIMUM DAILY AMOUNT 200MG. 3. Anxiety  Reasonable control with alprazolam as needed.  profile was accessed online for Romana Lama and reviewed by me during this encounter. I did not see evidence of inappropriate or suspicious controlled substance prescription activity.  -     ALPRAZolam (XANAX) 0.25 mg tablet; TAKE 1/2 (ONE-HALF) TABLET BY MOUTH ONCE DAILY IN THE MORNING AND 1 TABLET NIGHTLY AS NEEDED    4. Pure hypercholesterolemia  This condition is controlled by medication therapy with pravastatin 20. Refilled medications    5. Sprain of right wrist, initial encounter  Recommend wrist brace at night and as much as possible. Possible. Consider referral.  -     acetaminophen (Tylenol Arthritis Pain) 650 mg TbER; Take 1 Tablet by mouth every eight (8) hours. 6. Inguinal strain, right, initial encounter  Stretching demonstrated. Monitor for now.      lab results and schedule of future lab studies reviewed with patient  reviewed medications and side effects in detail    Return to clinic for further evaluation if new symptoms develop        Current Outpatient Medications   Medication Sig    ALPRAZolam (XANAX) 0.25 mg tablet TAKE 1/2 (ONE-HALF) TABLET BY MOUTH ONCE DAILY IN THE MORNING AND 1 TABLET NIGHTLY AS NEEDED    testosterone cypionate (DEPOTESTOTERONE CYPIONATE) 200 mg/mL injection INJECT 1 ML (CC) INTRAMUSCULARLY EVERY 14 DAYS. MAXIMUM DAILY AMOUNT 200MG. acetaminophen (Tylenol Arthritis Pain) 650 mg TbER Take 1 Tablet by mouth every eight (8) hours. spironolactone (ALDACTONE) 100 mg tablet TAKE 1 TABLET BY MOUTH ONCE DAILY. DO NOT TAKE WITH POTASSIUM    hydroCHLOROthiazide (HYDRODIURIL) 12.5 mg tablet Take 1 tablet by mouth daily    pantoprazole (PROTONIX) 40 mg tablet Take 1 tablet by mouth daily    pravastatin (PRAVACHOL) 20 mg tablet Take 1 tablet by mouth nightly    cholecalciferol (VITAMIN D3) (50,000 UNITS /1250 MCG) capsule TAKE 1 CAPSULE BY MOUTH EVERY 7 DAYS    clindamycin (CLEOCIN T) 1 % external solution use thin film on affected area    sildenafil citrate (VIAGRA) 100 mg tablet Take 1 Tablet by mouth daily as needed for Erectile Dysfunction. TAKE 1 TABLET BY MOUTH DAILY AS NEEDED FOR ERECTILE DYSFUNCTION     No current facility-administered medications for this visit.

## 2023-05-02 RX ORDER — EMTRICITABINE AND TENOFOVIR DISOPROXIL FUMARATE 200; 300 MG/1; MG/1
1 TABLET, FILM COATED ORAL DAILY
Qty: 30 TABLET | Refills: 3 | Status: SHIPPED | OUTPATIENT
Start: 2023-05-02

## 2023-05-03 ENCOUNTER — TELEPHONE (OUTPATIENT)
Dept: INTERNAL MEDICINE CLINIC | Age: 58
End: 2023-05-03

## 2023-08-13 RX ORDER — SPIRONOLACTONE 100 MG/1
TABLET, FILM COATED ORAL
Qty: 90 TABLET | Refills: 1 | Status: SHIPPED | OUTPATIENT
Start: 2023-08-13 | End: 2023-08-14 | Stop reason: SDUPTHER

## 2023-08-14 ENCOUNTER — OFFICE VISIT (OUTPATIENT)
Age: 58
End: 2023-08-14
Payer: COMMERCIAL

## 2023-08-14 VITALS
HEIGHT: 69 IN | WEIGHT: 203 LBS | DIASTOLIC BLOOD PRESSURE: 84 MMHG | HEART RATE: 70 BPM | RESPIRATION RATE: 18 BRPM | TEMPERATURE: 98.4 F | OXYGEN SATURATION: 94 % | SYSTOLIC BLOOD PRESSURE: 122 MMHG | BODY MASS INDEX: 30.07 KG/M2

## 2023-08-14 DIAGNOSIS — I10 HYPERTENSION, WELL CONTROLLED: Primary | ICD-10-CM

## 2023-08-14 DIAGNOSIS — F41.9 ANXIETY: ICD-10-CM

## 2023-08-14 DIAGNOSIS — Z11.3 SCREEN FOR STD (SEXUALLY TRANSMITTED DISEASE): ICD-10-CM

## 2023-08-14 DIAGNOSIS — I10 HYPERTENSION, WELL CONTROLLED: ICD-10-CM

## 2023-08-14 DIAGNOSIS — E78.00 PURE HYPERCHOLESTEROLEMIA: ICD-10-CM

## 2023-08-14 DIAGNOSIS — L70.0 CYSTIC ACNE: ICD-10-CM

## 2023-08-14 DIAGNOSIS — Z12.11 SCREEN FOR COLON CANCER: ICD-10-CM

## 2023-08-14 DIAGNOSIS — Z72.51 HIGH RISK SEXUAL BEHAVIOR, UNSPECIFIED TYPE: ICD-10-CM

## 2023-08-14 DIAGNOSIS — E29.1 MALE HYPOGONADISM: ICD-10-CM

## 2023-08-14 PROCEDURE — 3079F DIAST BP 80-89 MM HG: CPT | Performed by: INTERNAL MEDICINE

## 2023-08-14 PROCEDURE — 3074F SYST BP LT 130 MM HG: CPT | Performed by: INTERNAL MEDICINE

## 2023-08-14 PROCEDURE — 99214 OFFICE O/P EST MOD 30 MIN: CPT | Performed by: INTERNAL MEDICINE

## 2023-08-14 RX ORDER — SILDENAFIL 100 MG/1
100 TABLET, FILM COATED ORAL DAILY PRN
Qty: 30 TABLET | Refills: 5 | Status: SHIPPED | OUTPATIENT
Start: 2023-08-14

## 2023-08-14 RX ORDER — EMTRICITABINE AND TENOFOVIR DISOPROXIL FUMARATE 200; 300 MG/1; MG/1
1 TABLET, FILM COATED ORAL DAILY
COMMUNITY
Start: 2023-07-22 | End: 2023-08-14 | Stop reason: SDUPTHER

## 2023-08-14 RX ORDER — EMTRICITABINE AND TENOFOVIR DISOPROXIL FUMARATE 200; 300 MG/1; MG/1
1 TABLET, FILM COATED ORAL DAILY
Qty: 90 TABLET | Refills: 1 | Status: SHIPPED | OUTPATIENT
Start: 2023-08-14

## 2023-08-14 RX ORDER — CLINDAMYCIN PHOSPHATE 11.9 MG/ML
SOLUTION TOPICAL 2 TIMES DAILY
Qty: 30 ML | Refills: 5 | Status: SHIPPED | OUTPATIENT
Start: 2023-08-14

## 2023-08-14 RX ORDER — PRAVASTATIN SODIUM 20 MG
20 TABLET ORAL NIGHTLY
Qty: 90 TABLET | Refills: 1 | Status: SHIPPED | OUTPATIENT
Start: 2023-08-14

## 2023-08-14 RX ORDER — TESTOSTERONE CYPIONATE 200 MG/ML
200 INJECTION, SOLUTION INTRAMUSCULAR
Qty: 10 ML | Refills: 1 | Status: SHIPPED | OUTPATIENT
Start: 2023-08-14 | End: 2024-02-10

## 2023-08-14 RX ORDER — PANTOPRAZOLE SODIUM 40 MG/1
40 TABLET, DELAYED RELEASE ORAL DAILY
Qty: 30 TABLET | Refills: 1 | Status: SHIPPED | OUTPATIENT
Start: 2023-08-14 | End: 2023-08-14 | Stop reason: SDUPTHER

## 2023-08-14 RX ORDER — SPIRONOLACTONE 100 MG/1
100 TABLET, FILM COATED ORAL DAILY
Qty: 90 TABLET | Refills: 1 | Status: SHIPPED | OUTPATIENT
Start: 2023-08-14

## 2023-08-14 RX ORDER — PANTOPRAZOLE SODIUM 40 MG/1
40 TABLET, DELAYED RELEASE ORAL DAILY
Qty: 90 TABLET | Refills: 1 | Status: SHIPPED | OUTPATIENT
Start: 2023-08-14

## 2023-08-14 RX ORDER — HYDROCHLOROTHIAZIDE 12.5 MG/1
12.5 TABLET ORAL DAILY
Qty: 90 TABLET | Refills: 1 | Status: SHIPPED | OUTPATIENT
Start: 2023-08-14

## 2023-08-14 RX ORDER — ALPRAZOLAM 0.25 MG/1
0.5 TABLET ORAL NIGHTLY PRN
Qty: 45 TABLET | Refills: 1 | Status: SHIPPED | OUTPATIENT
Start: 2023-08-14 | End: 2023-11-12

## 2023-08-14 SDOH — ECONOMIC STABILITY: FOOD INSECURITY: WITHIN THE PAST 12 MONTHS, THE FOOD YOU BOUGHT JUST DIDN'T LAST AND YOU DIDN'T HAVE MONEY TO GET MORE.: NEVER TRUE

## 2023-08-14 SDOH — ECONOMIC STABILITY: HOUSING INSECURITY
IN THE LAST 12 MONTHS, WAS THERE A TIME WHEN YOU DID NOT HAVE A STEADY PLACE TO SLEEP OR SLEPT IN A SHELTER (INCLUDING NOW)?: NO

## 2023-08-14 SDOH — ECONOMIC STABILITY: INCOME INSECURITY: HOW HARD IS IT FOR YOU TO PAY FOR THE VERY BASICS LIKE FOOD, HOUSING, MEDICAL CARE, AND HEATING?: NOT HARD AT ALL

## 2023-08-14 SDOH — ECONOMIC STABILITY: FOOD INSECURITY: WITHIN THE PAST 12 MONTHS, YOU WORRIED THAT YOUR FOOD WOULD RUN OUT BEFORE YOU GOT MONEY TO BUY MORE.: NEVER TRUE

## 2023-08-14 ASSESSMENT — PATIENT HEALTH QUESTIONNAIRE - PHQ9
SUM OF ALL RESPONSES TO PHQ QUESTIONS 1-9: 0
1. LITTLE INTEREST OR PLEASURE IN DOING THINGS: 0
SUM OF ALL RESPONSES TO PHQ QUESTIONS 1-9: 0
SUM OF ALL RESPONSES TO PHQ QUESTIONS 1-9: 0
2. FEELING DOWN, DEPRESSED OR HOPELESS: 0
SUM OF ALL RESPONSES TO PHQ QUESTIONS 1-9: 0
SUM OF ALL RESPONSES TO PHQ9 QUESTIONS 1 & 2: 0

## 2023-08-27 RX ORDER — CHOLECALCIFEROL (VITAMIN D3) 1250 MCG
CAPSULE ORAL
Qty: 13 CAPSULE | Refills: 3 | Status: SHIPPED | OUTPATIENT
Start: 2023-08-27

## 2023-09-16 LAB
ALBUMIN SERPL-MCNC: 4.6 G/DL (ref 3.8–4.9)
ALBUMIN/GLOB SERPL: 1.8 {RATIO} (ref 1.2–2.2)
ALP SERPL-CCNC: 78 IU/L (ref 44–121)
ALT SERPL-CCNC: 17 IU/L (ref 0–44)
AST SERPL-CCNC: 22 IU/L (ref 0–40)
BILIRUB SERPL-MCNC: 0.6 MG/DL (ref 0–1.2)
BUN SERPL-MCNC: 10 MG/DL (ref 6–24)
BUN/CREAT SERPL: 7 (ref 9–20)
CALCIUM SERPL-MCNC: 9.3 MG/DL (ref 8.7–10.2)
CHLORIDE SERPL-SCNC: 101 MMOL/L (ref 96–106)
CO2 SERPL-SCNC: 24 MMOL/L (ref 20–29)
CREAT SERPL-MCNC: 1.41 MG/DL (ref 0.76–1.27)
EGFRCR SERPLBLD CKD-EPI 2021: 57 ML/MIN/1.73
ERYTHROCYTE [DISTWIDTH] IN BLOOD BY AUTOMATED COUNT: 13.1 % (ref 11.6–15.4)
GLOBULIN SER CALC-MCNC: 2.6 G/DL (ref 1.5–4.5)
GLUCOSE SERPL-MCNC: 101 MG/DL (ref 70–99)
HCT VFR BLD AUTO: 46.5 % (ref 37.5–51)
HGB BLD-MCNC: 16.1 G/DL (ref 13–17.7)
HIV 1+2 AB+HIV1 P24 AG SERPL QL IA: NON REACTIVE
MCH RBC QN AUTO: 33.9 PG (ref 26.6–33)
MCHC RBC AUTO-ENTMCNC: 34.6 G/DL (ref 31.5–35.7)
MCV RBC AUTO: 98 FL (ref 79–97)
PLATELET # BLD AUTO: 183 X10E3/UL (ref 150–450)
POTASSIUM SERPL-SCNC: 4 MMOL/L (ref 3.5–5.2)
PROT SERPL-MCNC: 7.2 G/DL (ref 6–8.5)
RBC # BLD AUTO: 4.75 X10E6/UL (ref 4.14–5.8)
REPORT: NORMAL
SODIUM SERPL-SCNC: 141 MMOL/L (ref 134–144)
WBC # BLD AUTO: 7.4 X10E3/UL (ref 3.4–10.8)

## 2024-02-03 DIAGNOSIS — Z72.51 HIGH RISK SEXUAL BEHAVIOR, UNSPECIFIED TYPE: ICD-10-CM

## 2024-02-05 RX ORDER — EMTRICITABINE AND TENOFOVIR DISOPROXIL FUMARATE 200; 300 MG/1; MG/1
1 TABLET, FILM COATED ORAL DAILY
Qty: 90 TABLET | Refills: 1 | Status: SHIPPED | OUTPATIENT
Start: 2024-02-05

## 2024-02-11 DIAGNOSIS — I10 HYPERTENSION, WELL CONTROLLED: ICD-10-CM

## 2024-02-11 DIAGNOSIS — E78.00 PURE HYPERCHOLESTEROLEMIA: ICD-10-CM

## 2024-02-12 RX ORDER — PRAVASTATIN SODIUM 20 MG
TABLET ORAL
Qty: 90 TABLET | Refills: 0 | Status: SHIPPED | OUTPATIENT
Start: 2024-02-12

## 2024-02-12 RX ORDER — HYDROCHLOROTHIAZIDE 12.5 MG/1
12.5 TABLET ORAL DAILY
Qty: 90 TABLET | Refills: 0 | Status: SHIPPED | OUTPATIENT
Start: 2024-02-12 | End: 2024-02-16 | Stop reason: SDUPTHER

## 2024-02-16 ENCOUNTER — OFFICE VISIT (OUTPATIENT)
Age: 59
End: 2024-02-16
Payer: COMMERCIAL

## 2024-02-16 VITALS
HEART RATE: 71 BPM | RESPIRATION RATE: 18 BRPM | BODY MASS INDEX: 30.3 KG/M2 | HEIGHT: 69 IN | OXYGEN SATURATION: 94 % | TEMPERATURE: 98.3 F | WEIGHT: 204.6 LBS | DIASTOLIC BLOOD PRESSURE: 71 MMHG | SYSTOLIC BLOOD PRESSURE: 109 MMHG

## 2024-02-16 DIAGNOSIS — E29.1 MALE HYPOGONADISM: ICD-10-CM

## 2024-02-16 DIAGNOSIS — I10 HYPERTENSION, WELL CONTROLLED: Primary | ICD-10-CM

## 2024-02-16 DIAGNOSIS — Z72.51 HIGH RISK SEXUAL BEHAVIOR, UNSPECIFIED TYPE: ICD-10-CM

## 2024-02-16 DIAGNOSIS — F41.9 ANXIETY: ICD-10-CM

## 2024-02-16 DIAGNOSIS — Z12.5 SCREENING FOR PROSTATE CANCER: ICD-10-CM

## 2024-02-16 DIAGNOSIS — E78.00 PURE HYPERCHOLESTEROLEMIA: ICD-10-CM

## 2024-02-16 DIAGNOSIS — I10 HYPERTENSION, WELL CONTROLLED: ICD-10-CM

## 2024-02-16 DIAGNOSIS — L70.0 CYSTIC ACNE: ICD-10-CM

## 2024-02-16 DIAGNOSIS — Z11.3 SCREEN FOR STD (SEXUALLY TRANSMITTED DISEASE): ICD-10-CM

## 2024-02-16 PROCEDURE — 99214 OFFICE O/P EST MOD 30 MIN: CPT | Performed by: INTERNAL MEDICINE

## 2024-02-16 PROCEDURE — 3078F DIAST BP <80 MM HG: CPT | Performed by: INTERNAL MEDICINE

## 2024-02-16 PROCEDURE — 3074F SYST BP LT 130 MM HG: CPT | Performed by: INTERNAL MEDICINE

## 2024-02-16 RX ORDER — PANTOPRAZOLE SODIUM 40 MG/1
40 TABLET, DELAYED RELEASE ORAL DAILY
Qty: 90 TABLET | Refills: 1 | Status: SHIPPED | OUTPATIENT
Start: 2024-02-16

## 2024-02-16 RX ORDER — ALPRAZOLAM 0.25 MG/1
0.5 TABLET ORAL NIGHTLY PRN
Qty: 45 TABLET | Refills: 1 | Status: SHIPPED | OUTPATIENT
Start: 2024-02-16 | End: 2024-05-16

## 2024-02-16 RX ORDER — SPIRONOLACTONE 100 MG/1
100 TABLET, FILM COATED ORAL DAILY
Qty: 90 TABLET | Refills: 1 | Status: SHIPPED | OUTPATIENT
Start: 2024-02-16

## 2024-02-16 RX ORDER — SILDENAFIL 100 MG/1
100 TABLET, FILM COATED ORAL DAILY PRN
Qty: 30 TABLET | Refills: 5 | Status: SHIPPED | OUTPATIENT
Start: 2024-02-16

## 2024-02-16 RX ORDER — HYDROCHLOROTHIAZIDE 12.5 MG/1
12.5 TABLET ORAL DAILY
Qty: 90 TABLET | Refills: 1 | Status: SHIPPED | OUTPATIENT
Start: 2024-02-16

## 2024-02-16 RX ORDER — SENNOSIDES 8.6 MG
650 CAPSULE ORAL EVERY 8 HOURS
Qty: 60 TABLET | Refills: 5 | Status: SHIPPED | OUTPATIENT
Start: 2024-02-16

## 2024-02-16 RX ORDER — CHOLECALCIFEROL (VITAMIN D3) 1250 MCG
1 CAPSULE ORAL WEEKLY
Qty: 13 CAPSULE | Refills: 3 | Status: SHIPPED | OUTPATIENT
Start: 2024-02-16

## 2024-02-16 RX ORDER — TESTOSTERONE CYPIONATE 200 MG/ML
200 INJECTION, SOLUTION INTRAMUSCULAR
Qty: 10 ML | Refills: 1 | Status: SHIPPED | OUTPATIENT
Start: 2024-02-16 | End: 2024-08-14

## 2024-02-16 RX ORDER — CLINDAMYCIN PHOSPHATE 11.9 MG/ML
SOLUTION TOPICAL 2 TIMES DAILY
Qty: 30 ML | Refills: 5 | Status: SHIPPED | OUTPATIENT
Start: 2024-02-16

## 2024-02-16 NOTE — PROGRESS NOTES
palpable hernias or lymphadenopathy.  Full range of motion of the right hip.  Right wrist with discomfort  Neurological: Alert and oriented to person, place, and time.   Skin: No visible rash noted.    Psychiatric: Normal mood and affect. Behavior is normal.     Diagnoses and all orders for this visit:    Cystic acne  Controlled.  Continue spironolactone and clindamycin    Male hypogonadism  Controlled.  Continue testosterone injections.  Will monitor CBC, PSA intermittently at next visit within 6 months.  -     testosterone cypionate (DEPOTESTOTERONE CYPIONATE) 200 mg/mL injection; INJECT 1 ML (CC) INTRAMUSCULARLY EVERY 14 DAYS. MAXIMUM DAILY AMOUNT 200MG    Anxiety  Reasonable control with alprazolam as needed.   profile was accessed online for Neville Boyer and reviewed by me during this encounter.  I did not see evidence of inappropriate or suspicious controlled substance prescription activity.  -     ALPRAZolam (XANAX) 0.25 mg tablet; TAKE 1/2 (ONE-HALF) TABLET BY MOUTH ONCE DAILY IN THE MORNING AND 1 TABLET NIGHTLY AS NEEDED    Pure hypercholesterolemia  This condition is controlled by medication therapy with pravastatin 20.  Refilled medications  Neville was seen today for hypertension and lab collection.    Hypertension, well controlled  This condition is controlled on current medication regimen as written in medication list.  Medications refilled.  -     CBC; Future  -     Comprehensive Metabolic Panel; Future  -     spironolactone (ALDACTONE) 100 MG tablet; Take 1 tablet by mouth daily  -     hydroCHLOROthiazide (HYDRODIURIL) 12.5 MG tablet; Take 1 tablet by mouth daily    Screen for STD (sexually transmitted disease)  -     HIV 1/2 Ag/Ab, 4TH Generation,W Rflx Confirm; Future  -    RPR  --   GC/chlamydia    Screen for colon cancer  Strongly encouraged follow-up regarding his multiple polyps that he had over 10 years ago.  -     [facility] - Shawn Monteiro MD, GastroenterologyAbhijeet

## 2024-02-18 LAB
ALBUMIN SERPL-MCNC: 4.6 G/DL (ref 3.8–4.9)
ALBUMIN/GLOB SERPL: 1.9 {RATIO} (ref 1.2–2.2)
ALP SERPL-CCNC: 74 IU/L (ref 44–121)
ALT SERPL-CCNC: 16 IU/L (ref 0–44)
AST SERPL-CCNC: 20 IU/L (ref 0–40)
BILIRUB SERPL-MCNC: 0.3 MG/DL (ref 0–1.2)
BUN SERPL-MCNC: 16 MG/DL (ref 6–24)
BUN/CREAT SERPL: 11 (ref 9–20)
CALCIUM SERPL-MCNC: 9.5 MG/DL (ref 8.7–10.2)
CHLORIDE SERPL-SCNC: 103 MMOL/L (ref 96–106)
CHOLEST SERPL-MCNC: 186 MG/DL (ref 100–199)
CO2 SERPL-SCNC: 24 MMOL/L (ref 20–29)
CREAT SERPL-MCNC: 1.52 MG/DL (ref 0.76–1.27)
EGFRCR SERPLBLD CKD-EPI 2021: 52 ML/MIN/1.73
ERYTHROCYTE [DISTWIDTH] IN BLOOD BY AUTOMATED COUNT: 13.1 % (ref 11.6–15.4)
GLOBULIN SER CALC-MCNC: 2.4 G/DL (ref 1.5–4.5)
GLUCOSE SERPL-MCNC: 84 MG/DL (ref 70–99)
HCT VFR BLD AUTO: 47.9 % (ref 37.5–51)
HDLC SERPL-MCNC: 36 MG/DL
HGB BLD-MCNC: 15.8 G/DL (ref 13–17.7)
IMP & REVIEW OF LAB RESULTS: NORMAL
LDLC SERPL CALC-MCNC: 112 MG/DL (ref 0–99)
MCH RBC QN AUTO: 32.4 PG (ref 26.6–33)
MCHC RBC AUTO-ENTMCNC: 33 G/DL (ref 31.5–35.7)
MCV RBC AUTO: 98 FL (ref 79–97)
PLATELET # BLD AUTO: 242 X10E3/UL (ref 150–450)
POTASSIUM SERPL-SCNC: 4.4 MMOL/L (ref 3.5–5.2)
PROT SERPL-MCNC: 7 G/DL (ref 6–8.5)
PSA SERPL-MCNC: 0.7 NG/ML (ref 0–4)
RBC # BLD AUTO: 4.87 X10E6/UL (ref 4.14–5.8)
REPORT: NORMAL
REPORT: NORMAL
SODIUM SERPL-SCNC: 144 MMOL/L (ref 134–144)
TRIGL SERPL-MCNC: 218 MG/DL (ref 0–149)
VLDLC SERPL CALC-MCNC: 38 MG/DL (ref 5–40)
WBC # BLD AUTO: 7.4 X10E3/UL (ref 3.4–10.8)

## 2024-02-26 ENCOUNTER — TELEPHONE (OUTPATIENT)
Age: 59
End: 2024-02-26

## 2024-02-26 DIAGNOSIS — Z11.3 SCREEN FOR STD (SEXUALLY TRANSMITTED DISEASE): ICD-10-CM

## 2024-02-26 DIAGNOSIS — E29.1 TESTICULAR HYPOFUNCTION: ICD-10-CM

## 2024-02-26 DIAGNOSIS — Z72.51 HIGH RISK SEXUAL BEHAVIOR, UNSPECIFIED TYPE: Primary | ICD-10-CM

## 2024-02-26 DIAGNOSIS — E29.1 MALE HYPOGONADISM: ICD-10-CM

## 2024-02-26 NOTE — TELEPHONE ENCOUNTER
----- Message from Renaldo Melvin MD sent at 2/25/2024 12:20 PM EST -----  Regarding: [facility] results    Sylvie, please contact [facility].  Multiple tests not resulted including testosterone, HIV, RPR and urinalysis for chlamydia and gonorrhea.  Most important tests are the HIV and testosterone.

## 2024-02-26 NOTE — TELEPHONE ENCOUNTER
Spoke with Ivania from [facility] and she reports that the Testosterone, HIV, RPR, Urinalysis for Chlamydia and Gonorrhea were not drawn. She advised that patient come back to have them drawn and provide urine specimen. They no longer have the specimens. The CMP, CBC, Lipid Panel, PSA were the only ones completed. Will notify patient of need for redraw. Dr. Melvin notified.

## 2024-02-27 ENCOUNTER — TELEPHONE (OUTPATIENT)
Age: 59
End: 2024-02-27

## 2024-02-27 NOTE — TELEPHONE ENCOUNTER
Spoke with patient and advised  that he needs to have a redraw of his labs due to some of them had been missed. He states understanding and asks that the requisition be mailed to him so he can have them done at at [facility] in [place]. Mailed to his home as requested. Grateful for the call.

## 2024-02-28 ENCOUNTER — TELEPHONE (OUTPATIENT)
Age: 59
End: 2024-02-28

## 2024-02-28 NOTE — TELEPHONE ENCOUNTER
Per PCP, medical record request sent via [employer] Electronic Routing Function to Medical Records for Dr. [facility] for a copy of patient's most recent colonoscopy report & applicable pathology report.  [employer] fax results report shows a successful transmission of the medical record request.

## 2024-03-04 ENCOUNTER — TELEPHONE (OUTPATIENT)
Age: 59
End: 2024-03-04

## 2024-03-04 ENCOUNTER — PATIENT MESSAGE (OUTPATIENT)
Age: 59
End: 2024-03-04

## 2024-03-04 DIAGNOSIS — F41.9 ANXIETY: ICD-10-CM

## 2024-03-04 RX ORDER — ALPRAZOLAM 0.25 MG/1
0.25 TABLET ORAL 2 TIMES DAILY PRN
Qty: 60 TABLET | Refills: 0 | Status: SHIPPED | OUTPATIENT
Start: 2024-03-04 | End: 2024-04-03

## 2024-03-04 NOTE — TELEPHONE ENCOUNTER
Rosa called from [facility] pharmacy and she is requesting a call back to receive verbal clarification on the medication listed below. She stated that she believe the quantity is incorrect.   ALPRAZolam (XANAX) 0.25 MG tablet     [facility] [address] [place] [address] [address] - P [phone] - F [phone]

## 2024-03-04 NOTE — TELEPHONE ENCOUNTER
From: Neville Boyer  To: Dr. Renaldo Melvin  Sent: 3/4/2024 6:07 AM EST  Subject: Alprazolam    Dr. Melvin,  The alprazolam prescription you wrote me can't ve filled. At first, [facility] said it was too early. I went yesterday to have it filled and they still can't. They say the way it's written, it's say 90 day supply. However, the way you wrote it, there is not enough on it to fill it.   Anyway, I am about out. Can you please call or send the prescription?  Thanks,  Neville

## 2024-03-26 ENCOUNTER — TELEPHONE (OUTPATIENT)
Age: 59
End: 2024-03-26

## 2024-03-26 NOTE — TELEPHONE ENCOUNTER
Spoke with patient and advised that his labs that were mailed to him were returned. Verified his address:  [address] [place] [id number]  Sent his lab requisitions to the above address. Grateful for the call. Address updated.

## 2024-04-20 LAB — RPR SER QL: NON REACTIVE

## 2024-04-22 LAB
C TRACH RRNA SPEC QL NAA+PROBE: NEGATIVE
N GONORRHOEA RRNA SPEC QL NAA+PROBE: NEGATIVE

## 2024-04-23 LAB
HIV 1+2 AB+HIV1 P24 AG SERPL QL IA: NON REACTIVE
TESTOST FREE SERPL-MCNC: 16.7 PG/ML (ref 7.2–24)
TESTOST SERPL-MCNC: 807 NG/DL (ref 264–916)

## 2024-05-03 DIAGNOSIS — Z72.51 HIGH RISK SEXUAL BEHAVIOR, UNSPECIFIED TYPE: ICD-10-CM

## 2024-05-03 RX ORDER — EMTRICITABINE AND TENOFOVIR DISOPROXIL FUMARATE 200; 300 MG/1; MG/1
1 TABLET, FILM COATED ORAL DAILY
Qty: 90 TABLET | Refills: 1 | Status: SHIPPED | OUTPATIENT
Start: 2024-05-03

## 2024-05-17 ENCOUNTER — PATIENT MESSAGE (OUTPATIENT)
Age: 59
End: 2024-05-17

## 2024-05-17 DIAGNOSIS — F41.9 ANXIETY: Primary | ICD-10-CM

## 2024-05-17 RX ORDER — ALPRAZOLAM 0.25 MG/1
0.25 TABLET ORAL 2 TIMES DAILY PRN
Qty: 60 TABLET | Refills: 0 | Status: SHIPPED | OUTPATIENT
Start: 2024-05-17 | End: 2024-06-16

## 2024-05-17 NOTE — TELEPHONE ENCOUNTER
From: Neville Boyer  To: Dr. Renaldo Melvin  Sent: 5/17/2024 12:30 PM EDT  Subject: Alprazolam    Good afternoon Doc,   I hope that you and your family are well. You told me that when I needed more alprazolam to message you. You gave me 60 and it was filled around the first if March. I have stretched it as far as I can and I am down to two tablets. Can you call a refill in or mail me the prescription?  Thanks,  Neville

## 2024-07-10 DIAGNOSIS — E78.00 PURE HYPERCHOLESTEROLEMIA: ICD-10-CM

## 2024-07-10 RX ORDER — PRAVASTATIN SODIUM 20 MG
20 TABLET ORAL NIGHTLY
Qty: 90 TABLET | Refills: 1 | Status: SHIPPED | OUTPATIENT
Start: 2024-07-10

## 2024-07-10 NOTE — TELEPHONE ENCOUNTER
Requested Prescriptions     Pending Prescriptions Disp Refills    pravastatin (PRAVACHOL) 20 MG tablet [Pharmacy Med Name: Pravastatin Sodium 20 MG Oral Tablet] 90 tablet 0     Sig: TAKE 1 TABLET BY MOUTH ONCE DAILY AT NIGHT

## 2024-07-22 ENCOUNTER — OFFICE VISIT (OUTPATIENT)
Age: 59
End: 2024-07-22
Payer: COMMERCIAL

## 2024-07-22 VITALS
HEIGHT: 69 IN | RESPIRATION RATE: 16 BRPM | WEIGHT: 186.4 LBS | BODY MASS INDEX: 27.61 KG/M2 | HEART RATE: 71 BPM | OXYGEN SATURATION: 94 % | TEMPERATURE: 98.7 F | DIASTOLIC BLOOD PRESSURE: 66 MMHG | SYSTOLIC BLOOD PRESSURE: 100 MMHG

## 2024-07-22 DIAGNOSIS — I10 HYPERTENSION, WELL CONTROLLED: ICD-10-CM

## 2024-07-22 DIAGNOSIS — E78.00 PURE HYPERCHOLESTEROLEMIA: ICD-10-CM

## 2024-07-22 DIAGNOSIS — E29.1 MALE HYPOGONADISM: ICD-10-CM

## 2024-07-22 DIAGNOSIS — L70.0 CYSTIC ACNE: ICD-10-CM

## 2024-07-22 DIAGNOSIS — F41.9 ANXIETY: Primary | ICD-10-CM

## 2024-07-22 DIAGNOSIS — Z72.51 HIGH RISK SEXUAL BEHAVIOR, UNSPECIFIED TYPE: ICD-10-CM

## 2024-07-22 PROCEDURE — 3074F SYST BP LT 130 MM HG: CPT | Performed by: INTERNAL MEDICINE

## 2024-07-22 PROCEDURE — 99214 OFFICE O/P EST MOD 30 MIN: CPT | Performed by: INTERNAL MEDICINE

## 2024-07-22 PROCEDURE — 3078F DIAST BP <80 MM HG: CPT | Performed by: INTERNAL MEDICINE

## 2024-07-22 RX ORDER — SILDENAFIL 100 MG/1
100 TABLET, FILM COATED ORAL DAILY PRN
Qty: 30 TABLET | Refills: 5 | Status: SHIPPED | OUTPATIENT
Start: 2024-07-22

## 2024-07-22 RX ORDER — EMTRICITABINE AND TENOFOVIR DISOPROXIL FUMARATE 200; 300 MG/1; MG/1
1 TABLET, FILM COATED ORAL DAILY
Qty: 90 TABLET | Refills: 1 | Status: SHIPPED | OUTPATIENT
Start: 2024-07-22

## 2024-07-22 RX ORDER — CHOLECALCIFEROL (VITAMIN D3) 1250 MCG
1 CAPSULE ORAL WEEKLY
Qty: 13 CAPSULE | Refills: 3 | Status: SHIPPED | OUTPATIENT
Start: 2024-07-22

## 2024-07-22 RX ORDER — TESTOSTERONE CYPIONATE 200 MG/ML
200 INJECTION, SOLUTION INTRAMUSCULAR
Qty: 10 ML | Refills: 1 | Status: SHIPPED | OUTPATIENT
Start: 2024-07-22 | End: 2025-01-18

## 2024-07-22 RX ORDER — SENNOSIDES 8.6 MG
650 CAPSULE ORAL EVERY 8 HOURS
Qty: 60 TABLET | Refills: 5 | Status: SHIPPED | OUTPATIENT
Start: 2024-07-22

## 2024-07-22 RX ORDER — PANTOPRAZOLE SODIUM 40 MG/1
40 TABLET, DELAYED RELEASE ORAL DAILY
Qty: 90 TABLET | Refills: 1 | Status: SHIPPED | OUTPATIENT
Start: 2024-07-22

## 2024-07-22 RX ORDER — CLINDAMYCIN PHOSPHATE 11.9 MG/ML
SOLUTION TOPICAL 2 TIMES DAILY
Qty: 30 ML | Refills: 5 | Status: SHIPPED | OUTPATIENT
Start: 2024-07-22

## 2024-07-22 RX ORDER — SPIRONOLACTONE 100 MG/1
100 TABLET, FILM COATED ORAL DAILY
Qty: 90 TABLET | Refills: 1 | Status: SHIPPED | OUTPATIENT
Start: 2024-07-22

## 2024-07-22 RX ORDER — HYDROCHLOROTHIAZIDE 12.5 MG/1
12.5 TABLET ORAL DAILY
Qty: 90 TABLET | Refills: 1 | Status: SHIPPED | OUTPATIENT
Start: 2024-07-22

## 2024-07-22 RX ORDER — ALPRAZOLAM 0.25 MG/1
0.25 TABLET ORAL 2 TIMES DAILY PRN
Qty: 60 TABLET | Refills: 5 | Status: SHIPPED | OUTPATIENT
Start: 2024-07-22 | End: 2025-01-18

## 2024-07-22 NOTE — PROGRESS NOTES
HISTORY OF PRESENT ILLNESS    Chief Complaint   Patient presents with    Cholesterol Problem       Presents for follow-up.    He is with his friend, David.      Seeing Holistic doctor for cleansing.  Has decreased carbs significantly.    Drinks a little  alcohol.    Lose weight internationally.  Taking elderberry, Miracle move for bowels.    Wt Readings from Last 3 Encounters:   07/22/24 84.6 kg (186 lb 6.4 oz)   02/16/24 92.8 kg (204 lb 9.6 oz)   08/14/23 92.1 kg (203 lb)     Right shoulder pain.  Saw Ortho 2 weeks ago. Cortisone injection helped some.  Using heating pad.    Feels anterior pain and arm pain.      Continues PrEP therapy with [employer].  Denies any high risk behaviors, but he is somewhat evasive about that.  Is asymptomatic with no evidence of penile discharge, dysuria, fever, chills.    Cystic acne.  Taking spironolactone for prevention.Using clindamycin gel.    Hyperlipidemia  Currently he takes pravastatin 20 mg  ROS: taking medications as instructed, no medication side effects noted  No new myalgias, no joint pains, no weakness  No TIA's, no chest pain on exertion, no dyspnea on exertion, no swelling of ankles.     Hypertension  Hypertension ROS: taking medications as instructed, no medication side effects noted, no TIA's, no chest pain on exertion, no dyspnea on exertion, no swelling of ankles     reports that he quit smoking about 15 years ago. His smoking use included cigarettes. He has a 20.00 pack-year smoking history. He quit smokeless tobacco use about 16 years ago.    reports current alcohol use.   Blood pressure 100/66, pulse 71, temperature 98.7 °F (37.1 °C), temperature source Oral, resp. rate 16, height 1.753 m (5' 9\"), weight 84.6 kg (186 lb 6.4 oz), SpO2 94 %.     Anxiety  Patient is seen for anxiety disorder. Current treatment includes Xanax and no other therapies. Ongoing symptoms include: none. Patient denies: sweating, chest pain, dizziness, paresthesias, racing thoughts, feelings

## 2024-07-24 LAB
ALBUMIN SERPL-MCNC: 4.7 G/DL (ref 3.8–4.9)
ALP SERPL-CCNC: 68 IU/L (ref 44–121)
ALT SERPL-CCNC: 25 IU/L (ref 0–44)
AST SERPL-CCNC: 20 IU/L (ref 0–40)
BILIRUB SERPL-MCNC: 0.6 MG/DL (ref 0–1.2)
BUN SERPL-MCNC: 18 MG/DL (ref 8–27)
BUN/CREAT SERPL: 15 (ref 10–24)
CALCIUM SERPL-MCNC: 9.9 MG/DL (ref 8.6–10.2)
CHLORIDE SERPL-SCNC: 98 MMOL/L (ref 96–106)
CHOLEST SERPL-MCNC: 214 MG/DL (ref 100–199)
CO2 SERPL-SCNC: 23 MMOL/L (ref 20–29)
CREAT SERPL-MCNC: 1.23 MG/DL (ref 0.76–1.27)
EGFRCR SERPLBLD CKD-EPI 2021: 67 ML/MIN/1.73
ERYTHROCYTE [DISTWIDTH] IN BLOOD BY AUTOMATED COUNT: 14.4 % (ref 11.6–15.4)
GLOBULIN SER CALC-MCNC: 2.8 G/DL (ref 1.5–4.5)
GLUCOSE SERPL-MCNC: 94 MG/DL (ref 70–99)
HCT VFR BLD AUTO: 49.3 % (ref 37.5–51)
HDLC SERPL-MCNC: 55 MG/DL
HGB BLD-MCNC: 17 G/DL (ref 13–17.7)
HIV 1+2 AB+HIV1 P24 AG SERPL QL IA: NON REACTIVE
IMP & REVIEW OF LAB RESULTS: NORMAL
LDLC SERPL CALC-MCNC: 145 MG/DL (ref 0–99)
MCH RBC QN AUTO: 32.3 PG (ref 26.6–33)
MCHC RBC AUTO-ENTMCNC: 34.5 G/DL (ref 31.5–35.7)
MCV RBC AUTO: 94 FL (ref 79–97)
PLATELET # BLD AUTO: 180 X10E3/UL (ref 150–450)
POTASSIUM SERPL-SCNC: 4.1 MMOL/L (ref 3.5–5.2)
PROT SERPL-MCNC: 7.5 G/DL (ref 6–8.5)
PSA SERPL-MCNC: 0.7 NG/ML (ref 0–4)
RBC # BLD AUTO: 5.27 X10E6/UL (ref 4.14–5.8)
RPR SER QL: NON REACTIVE
SODIUM SERPL-SCNC: 137 MMOL/L (ref 134–144)
TRIGL SERPL-MCNC: 78 MG/DL (ref 0–149)
VLDLC SERPL CALC-MCNC: 14 MG/DL (ref 5–40)
WBC # BLD AUTO: 6.4 X10E3/UL (ref 3.4–10.8)

## 2024-11-03 DIAGNOSIS — Z72.51 HIGH RISK SEXUAL BEHAVIOR, UNSPECIFIED TYPE: ICD-10-CM

## 2024-11-05 RX ORDER — EMTRICITABINE AND TENOFOVIR DISOPROXIL FUMARATE 200; 300 MG/1; MG/1
1 TABLET, FILM COATED ORAL DAILY
Qty: 30 TABLET | Refills: 2 | Status: SHIPPED | OUTPATIENT
Start: 2024-11-05 | End: 2024-11-06

## 2024-11-06 DIAGNOSIS — Z72.51 HIGH RISK SEXUAL BEHAVIOR, UNSPECIFIED TYPE: ICD-10-CM

## 2024-11-06 RX ORDER — EMTRICITABINE AND TENOFOVIR DISOPROXIL FUMARATE 200; 300 MG/1; MG/1
1 TABLET, FILM COATED ORAL DAILY
Qty: 90 TABLET | Refills: 1 | Status: SHIPPED | OUTPATIENT
Start: 2024-11-06

## 2024-12-28 DIAGNOSIS — E78.00 PURE HYPERCHOLESTEROLEMIA: ICD-10-CM

## 2024-12-28 RX ORDER — PRAVASTATIN SODIUM 20 MG
20 TABLET ORAL NIGHTLY
Qty: 90 TABLET | Refills: 2 | Status: SHIPPED | OUTPATIENT
Start: 2024-12-28

## 2025-01-24 ENCOUNTER — OFFICE VISIT (OUTPATIENT)
Facility: CLINIC | Age: 60
End: 2025-01-24

## 2025-01-24 VITALS
BODY MASS INDEX: 27.91 KG/M2 | OXYGEN SATURATION: 94 % | WEIGHT: 188.4 LBS | SYSTOLIC BLOOD PRESSURE: 111 MMHG | TEMPERATURE: 98.9 F | HEIGHT: 69 IN | DIASTOLIC BLOOD PRESSURE: 69 MMHG | HEART RATE: 68 BPM

## 2025-01-24 DIAGNOSIS — E78.00 PURE HYPERCHOLESTEROLEMIA: ICD-10-CM

## 2025-01-24 DIAGNOSIS — E55.9 VITAMIN D DEFICIENCY: ICD-10-CM

## 2025-01-24 DIAGNOSIS — E29.1 MALE HYPOGONADISM: ICD-10-CM

## 2025-01-24 DIAGNOSIS — Z11.3 SCREEN FOR STD (SEXUALLY TRANSMITTED DISEASE): ICD-10-CM

## 2025-01-24 DIAGNOSIS — K21.9 GASTROESOPHAGEAL REFLUX DISEASE WITHOUT ESOPHAGITIS: ICD-10-CM

## 2025-01-24 DIAGNOSIS — F41.9 ANXIETY: ICD-10-CM

## 2025-01-24 DIAGNOSIS — Z72.51 HIGH RISK SEXUAL BEHAVIOR, UNSPECIFIED TYPE: ICD-10-CM

## 2025-01-24 DIAGNOSIS — I10 HYPERTENSION, WELL CONTROLLED: Primary | ICD-10-CM

## 2025-01-24 DIAGNOSIS — L70.0 CYSTIC ACNE: ICD-10-CM

## 2025-01-24 RX ORDER — CLINDAMYCIN PHOSPHATE 11.9 MG/ML
SOLUTION TOPICAL 2 TIMES DAILY
Qty: 30 ML | Refills: 5 | Status: SHIPPED | OUTPATIENT
Start: 2025-01-24

## 2025-01-24 RX ORDER — CHOLECALCIFEROL (VITAMIN D3) 1250 MCG
1 CAPSULE ORAL WEEKLY
Qty: 13 CAPSULE | Refills: 3 | Status: SHIPPED | OUTPATIENT
Start: 2025-01-24

## 2025-01-24 RX ORDER — EMTRICITABINE AND TENOFOVIR DISOPROXIL FUMARATE 200; 300 MG/1; MG/1
1 TABLET, FILM COATED ORAL DAILY
Qty: 90 TABLET | Refills: 1 | Status: SHIPPED | OUTPATIENT
Start: 2025-01-24

## 2025-01-24 RX ORDER — SPIRONOLACTONE 100 MG/1
100 TABLET, FILM COATED ORAL DAILY
Qty: 90 TABLET | Refills: 1 | Status: SHIPPED | OUTPATIENT
Start: 2025-01-24

## 2025-01-24 RX ORDER — TESTOSTERONE CYPIONATE 200 MG/ML
200 INJECTION, SOLUTION INTRAMUSCULAR
Qty: 10 ML | Refills: 1 | Status: SHIPPED | OUTPATIENT
Start: 2025-01-24 | End: 2025-07-23

## 2025-01-24 RX ORDER — SILDENAFIL 100 MG/1
100 TABLET, FILM COATED ORAL DAILY PRN
Qty: 30 TABLET | Refills: 5 | Status: SHIPPED | OUTPATIENT
Start: 2025-01-24

## 2025-01-24 RX ORDER — PANTOPRAZOLE SODIUM 40 MG/1
40 TABLET, DELAYED RELEASE ORAL DAILY
Qty: 90 TABLET | Refills: 1 | Status: SHIPPED | OUTPATIENT
Start: 2025-01-24

## 2025-01-24 RX ORDER — PRAVASTATIN SODIUM 20 MG
20 TABLET ORAL NIGHTLY
Qty: 90 TABLET | Refills: 2 | Status: SHIPPED | OUTPATIENT
Start: 2025-01-24

## 2025-01-24 RX ORDER — ALPRAZOLAM 0.25 MG/1
0.25 TABLET ORAL 2 TIMES DAILY PRN
Qty: 60 TABLET | Refills: 5 | Status: SHIPPED | OUTPATIENT
Start: 2025-01-24 | End: 2025-07-23

## 2025-01-24 RX ORDER — HYDROCHLOROTHIAZIDE 12.5 MG/1
12.5 TABLET ORAL DAILY
Qty: 90 TABLET | Refills: 1 | Status: CANCELLED | OUTPATIENT
Start: 2025-01-24

## 2025-01-24 SDOH — ECONOMIC STABILITY: FOOD INSECURITY: WITHIN THE PAST 12 MONTHS, YOU WORRIED THAT YOUR FOOD WOULD RUN OUT BEFORE YOU GOT MONEY TO BUY MORE.: NEVER TRUE

## 2025-01-24 SDOH — ECONOMIC STABILITY: FOOD INSECURITY: WITHIN THE PAST 12 MONTHS, THE FOOD YOU BOUGHT JUST DIDN'T LAST AND YOU DIDN'T HAVE MONEY TO GET MORE.: NEVER TRUE

## 2025-01-24 ASSESSMENT — PATIENT HEALTH QUESTIONNAIRE - PHQ9
SUM OF ALL RESPONSES TO PHQ QUESTIONS 1-9: 0
2. FEELING DOWN, DEPRESSED OR HOPELESS: NOT AT ALL
SUM OF ALL RESPONSES TO PHQ QUESTIONS 1-9: 0

## 2025-01-24 NOTE — PROGRESS NOTES
HISTORY OF PRESENT ILLNESS    Chief Complaint   Patient presents with    Hypertension    Anxiety       Presents for follow-up.  He is accompanied by David.    Focusing on health diet, lifestyle. Seeing Holistic doctor.  Taking elderberry, Ashwaganda, Miracle move for bowels.   Wt Readings from Last 3 Encounters:   01/24/25 85.5 kg (188 lb 6.4 oz)   07/22/24 84.6 kg (186 lb 6.4 oz)   02/16/24 92.8 kg (204 lb 9.6 oz)     Hypertension - taking HCTZ, spironolactone.  Would like to stop hctz  Hypertension ROS: taking medications as instructed, no medication side effects noted, no TIA's, no chest pain on exertion, no dyspnea on exertion, no swelling of ankles     reports that he quit smoking about 18 years ago. His smoking use included cigarettes. He started smoking about 31 years ago. He has a 13 pack-year smoking history. He quit smokeless tobacco use about 18 years ago.    reports current alcohol use.   BP Readings from Last 2 Encounters:   01/24/25 111/69   07/22/24 100/66     Hyperlipidemia- taking pravastatin 20 mg  Admits to diet high in shellfish this summer  No new myalgias, no joint pains, no weakness  No TIA's, no chest pain on exertion, no dyspnea on exertion, no swelling of ankles.   Lab Results   Component Value Date    CHOL 214 (H) 07/23/2024    TRIG 78 07/23/2024    HDL 55 07/23/2024     (H) 07/23/2024    VLDL 14 07/23/2024    CHOLHDLRATIO 5.8 (H) 07/17/2020     Continues PrEP therapy with [employer]. Denies any high risk behaviors Is asymptomatic with no evidence of penile discharge, dysuria, fever, chills.     Anxiety  Patient is seen for anxiety disorder. Current treatment includes Xanax and no other therapies. Ongoing symptoms include: none. Patient denies: sweating, chest pain, dizziness, paresthesias, racing thoughts, feelings of losing control, difficulty concentrating, suicidal ideation.   Denies substance or alcohol abuse. Reported side effects from the treatment: none.     Hypogonadism.  Is

## 2025-01-24 NOTE — PROGRESS NOTES
Identified pt with two pt identifiers(name and ). Reviewed record in preparation for visit and have obtained necessary documentation. All patient medications has been reviewed.  Chief Complaint   Patient presents with    Hypertension    Anxiety       Health Maintenance Due   Topic    DTaP/Tdap/Td vaccine (1 - Tdap)    Shingles vaccine (1 of 2)    Flu vaccine (1)    COVID-19 Vaccine ( season)    Depression Screen      Health Maintenance Review: Patient reminded of \"due or due soon\" health maintenance. I have asked the patient to contact his/her primary care provider (PCP) for follow-up on his/her health maintenance.    Wt Readings from Last 3 Encounters:   25 85.5 kg (188 lb 6.4 oz)   24 84.6 kg (186 lb 6.4 oz)   24 92.8 kg (204 lb 9.6 oz)     Temp Readings from Last 3 Encounters:   25 98.9 °F (37.2 °C)   24 98.7 °F (37.1 °C) (Oral)   24 98.3 °F (36.8 °C) (Oral)     BP Readings from Last 3 Encounters:   25 111/69   24 100/66   24 109/71     Pulse Readings from Last 3 Encounters:   25 68   24 71   24 71       1. \"Have you been to the ER, urgent care clinic since your last visit?  Hospitalized since your last visit?\" No    2. \"Have you seen or consulted any other health care providers outside of the [facility] since your last visit?\" Yes [facility]      3. For patients aged 45-75: Has the patient had a colonoscopy / FIT/ Cologuard? Yes - Care Gap present. Most recent result on file    Patient is accompanied by partner I have received verbal consent from Neville Boyer to discuss any/all medical information while they are present in the room.

## 2025-02-01 LAB
ALBUMIN SERPL-MCNC: 4.3 G/DL (ref 3.8–4.9)
ALP SERPL-CCNC: 78 IU/L (ref 44–121)
ALT SERPL-CCNC: 15 IU/L (ref 0–44)
AST SERPL-CCNC: 19 IU/L (ref 0–40)
BILIRUB SERPL-MCNC: 0.5 MG/DL (ref 0–1.2)
BUN SERPL-MCNC: 13 MG/DL (ref 8–27)
BUN/CREAT SERPL: 9 (ref 10–24)
CALCIUM SERPL-MCNC: 9.4 MG/DL (ref 8.6–10.2)
CHLORIDE SERPL-SCNC: 104 MMOL/L (ref 96–106)
CHOLEST SERPL-MCNC: 207 MG/DL (ref 100–199)
CO2 SERPL-SCNC: 24 MMOL/L (ref 20–29)
CREAT SERPL-MCNC: 1.51 MG/DL (ref 0.76–1.27)
EGFRCR SERPLBLD CKD-EPI 2021: 53 ML/MIN/1.73
ERYTHROCYTE [DISTWIDTH] IN BLOOD BY AUTOMATED COUNT: 12.4 % (ref 11.6–15.4)
GLOBULIN SER CALC-MCNC: 2.9 G/DL (ref 1.5–4.5)
GLUCOSE SERPL-MCNC: 83 MG/DL (ref 70–99)
HCT VFR BLD AUTO: 47.5 % (ref 37.5–51)
HDLC SERPL-MCNC: 39 MG/DL
HGB BLD-MCNC: 15.9 G/DL (ref 13–17.7)
HIV 1+2 AB+HIV1 P24 AG SERPL QL IA: NON REACTIVE
IMP & REVIEW OF LAB RESULTS: NORMAL
LDLC SERPL CALC-MCNC: 132 MG/DL (ref 0–99)
MCH RBC QN AUTO: 33 PG (ref 26.6–33)
MCHC RBC AUTO-ENTMCNC: 33.5 G/DL (ref 31.5–35.7)
MCV RBC AUTO: 99 FL (ref 79–97)
PLATELET # BLD AUTO: 192 X10E3/UL (ref 150–450)
POTASSIUM SERPL-SCNC: 5 MMOL/L (ref 3.5–5.2)
PROT SERPL-MCNC: 7.2 G/DL (ref 6–8.5)
PSA SERPL-MCNC: 0.6 NG/ML (ref 0–4)
RBC # BLD AUTO: 4.82 X10E6/UL (ref 4.14–5.8)
REPORT: NORMAL
REPORT: NORMAL
RPR SER QL: NON REACTIVE
SODIUM SERPL-SCNC: 142 MMOL/L (ref 134–144)
TRIGL SERPL-MCNC: 202 MG/DL (ref 0–149)
VLDLC SERPL CALC-MCNC: 36 MG/DL (ref 5–40)
WBC # BLD AUTO: 5.8 X10E3/UL (ref 3.4–10.8)

## 2025-02-03 LAB
TESTOST FREE SERPL-MCNC: 6.3 PG/ML (ref 6.6–18.1)
TESTOST SERPL-MCNC: 374 NG/DL (ref 264–916)

## 2025-02-04 ENCOUNTER — PATIENT MESSAGE (OUTPATIENT)
Facility: CLINIC | Age: 60
End: 2025-02-04

## 2025-02-04 DIAGNOSIS — K21.9 GASTROESOPHAGEAL REFLUX DISEASE WITHOUT ESOPHAGITIS: ICD-10-CM

## 2025-02-04 RX ORDER — PANTOPRAZOLE SODIUM 40 MG/1
40 TABLET, DELAYED RELEASE ORAL DAILY
Qty: 90 TABLET | Refills: 1 | Status: SHIPPED | OUTPATIENT
Start: 2025-02-04

## 2025-02-05 LAB
C TRACH RRNA SPEC QL NAA+PROBE: NEGATIVE
N GONORRHOEA RRNA SPEC QL NAA+PROBE: NEGATIVE
T VAGINALIS RRNA SPEC QL NAA+PROBE: NEGATIVE

## 2025-02-05 RX ORDER — SENNOSIDES 8.6 MG
650 CAPSULE ORAL EVERY 8 HOURS
Qty: 90 TABLET | Refills: 5 | Status: SHIPPED | OUTPATIENT
Start: 2025-02-05

## 2025-04-04 DIAGNOSIS — I10 HYPERTENSION, WELL CONTROLLED: ICD-10-CM

## 2025-04-04 RX ORDER — HYDROCHLOROTHIAZIDE 12.5 MG/1
12.5 TABLET ORAL DAILY
Qty: 90 TABLET | Refills: 0 | Status: SHIPPED | OUTPATIENT
Start: 2025-04-04

## 2025-04-30 ENCOUNTER — PATIENT MESSAGE (OUTPATIENT)
Facility: CLINIC | Age: 60
End: 2025-04-30

## 2025-04-30 DIAGNOSIS — Z72.51 HIGH RISK SEXUAL BEHAVIOR, UNSPECIFIED TYPE: ICD-10-CM

## 2025-04-30 RX ORDER — EMTRICITABINE AND TENOFOVIR DISOPROXIL FUMARATE 200; 300 MG/1; MG/1
1 TABLET, FILM COATED ORAL DAILY
Qty: 90 TABLET | Refills: 1 | Status: CANCELLED | OUTPATIENT
Start: 2025-04-30

## 2025-04-30 RX ORDER — EMTRICITABINE AND TENOFOVIR DISOPROXIL FUMARATE 200; 300 MG/1; MG/1
1 TABLET, FILM COATED ORAL DAILY
Qty: 90 TABLET | Refills: 1 | Status: ACTIVE | OUTPATIENT
Start: 2025-04-30

## 2025-07-06 DIAGNOSIS — I10 HYPERTENSION, WELL CONTROLLED: ICD-10-CM

## 2025-07-06 RX ORDER — HYDROCHLOROTHIAZIDE 12.5 MG/1
12.5 TABLET ORAL DAILY
Qty: 90 TABLET | Refills: 1 | Status: SHIPPED | OUTPATIENT
Start: 2025-07-06

## 2025-07-09 ENCOUNTER — OFFICE VISIT (OUTPATIENT)
Facility: CLINIC | Age: 60
End: 2025-07-09
Payer: COMMERCIAL

## 2025-07-09 VITALS
SYSTOLIC BLOOD PRESSURE: 132 MMHG | HEART RATE: 63 BPM | DIASTOLIC BLOOD PRESSURE: 75 MMHG | RESPIRATION RATE: 15 BRPM | TEMPERATURE: 98.3 F | BODY MASS INDEX: 28.73 KG/M2 | WEIGHT: 194 LBS | HEIGHT: 69 IN | OXYGEN SATURATION: 94 %

## 2025-07-09 DIAGNOSIS — E78.00 PURE HYPERCHOLESTEROLEMIA: ICD-10-CM

## 2025-07-09 DIAGNOSIS — L70.0 CYSTIC ACNE: ICD-10-CM

## 2025-07-09 DIAGNOSIS — E66.3 OVERWEIGHT (BMI 25.0-29.9): ICD-10-CM

## 2025-07-09 DIAGNOSIS — Z11.3 SCREEN FOR STD (SEXUALLY TRANSMITTED DISEASE): ICD-10-CM

## 2025-07-09 DIAGNOSIS — Z72.51 HIGH RISK SEXUAL BEHAVIOR, UNSPECIFIED TYPE: ICD-10-CM

## 2025-07-09 DIAGNOSIS — F41.9 ANXIETY: ICD-10-CM

## 2025-07-09 DIAGNOSIS — K21.9 GASTROESOPHAGEAL REFLUX DISEASE WITHOUT ESOPHAGITIS: ICD-10-CM

## 2025-07-09 DIAGNOSIS — E29.1 MALE HYPOGONADISM: ICD-10-CM

## 2025-07-09 DIAGNOSIS — Z13.1 SCREENING FOR DIABETES MELLITUS: ICD-10-CM

## 2025-07-09 DIAGNOSIS — I10 HYPERTENSION, WELL CONTROLLED: Primary | ICD-10-CM

## 2025-07-09 DIAGNOSIS — N28.9 KIDNEY INSUFFICIENCY: ICD-10-CM

## 2025-07-09 DIAGNOSIS — M47.816 OSTEOARTHRITIS OF LUMBAR SPINE, UNSPECIFIED SPINAL OSTEOARTHRITIS COMPLICATION STATUS: ICD-10-CM

## 2025-07-09 DIAGNOSIS — I10 HYPERTENSION, WELL CONTROLLED: ICD-10-CM

## 2025-07-09 PROCEDURE — 99214 OFFICE O/P EST MOD 30 MIN: CPT | Performed by: INTERNAL MEDICINE

## 2025-07-09 PROCEDURE — 3078F DIAST BP <80 MM HG: CPT | Performed by: INTERNAL MEDICINE

## 2025-07-09 PROCEDURE — 3075F SYST BP GE 130 - 139MM HG: CPT | Performed by: INTERNAL MEDICINE

## 2025-07-09 RX ORDER — EMTRICITABINE AND TENOFOVIR DISOPROXIL FUMARATE 200; 300 MG/1; MG/1
1 TABLET, FILM COATED ORAL DAILY
Qty: 90 TABLET | Refills: 1 | Status: SHIPPED | OUTPATIENT
Start: 2025-07-09

## 2025-07-09 RX ORDER — SILDENAFIL 100 MG/1
100 TABLET, FILM COATED ORAL DAILY PRN
Qty: 30 TABLET | Refills: 5 | Status: SHIPPED | OUTPATIENT
Start: 2025-07-09

## 2025-07-09 RX ORDER — SPIRONOLACTONE 100 MG/1
100 TABLET, FILM COATED ORAL DAILY
Qty: 90 TABLET | Refills: 1 | Status: SHIPPED | OUTPATIENT
Start: 2025-07-09

## 2025-07-09 RX ORDER — CLINDAMYCIN PHOSPHATE 11.9 MG/ML
SOLUTION TOPICAL 2 TIMES DAILY
Qty: 30 ML | Refills: 5 | Status: SHIPPED | OUTPATIENT
Start: 2025-07-09

## 2025-07-09 RX ORDER — PANTOPRAZOLE SODIUM 40 MG/1
40 TABLET, DELAYED RELEASE ORAL DAILY
Qty: 90 TABLET | Refills: 1 | Status: SHIPPED | OUTPATIENT
Start: 2025-07-09

## 2025-07-09 RX ORDER — TESTOSTERONE CYPIONATE 200 MG/ML
200 INJECTION, SOLUTION INTRAMUSCULAR
Qty: 10 ML | Refills: 1 | Status: SHIPPED | OUTPATIENT
Start: 2025-07-09 | End: 2026-01-05

## 2025-07-09 RX ORDER — PRAVASTATIN SODIUM 20 MG
20 TABLET ORAL NIGHTLY
Qty: 90 TABLET | Refills: 2 | Status: SHIPPED | OUTPATIENT
Start: 2025-07-09

## 2025-07-09 RX ORDER — ALPRAZOLAM 0.25 MG
0.25 TABLET ORAL 2 TIMES DAILY PRN
Qty: 60 TABLET | Refills: 5 | Status: SHIPPED | OUTPATIENT
Start: 2025-07-09 | End: 2026-01-05

## 2025-07-09 ASSESSMENT — PATIENT HEALTH QUESTIONNAIRE - PHQ9
1. LITTLE INTEREST OR PLEASURE IN DOING THINGS: NOT AT ALL
2. FEELING DOWN, DEPRESSED OR HOPELESS: NOT AT ALL
SUM OF ALL RESPONSES TO PHQ QUESTIONS 1-9: 0

## 2025-07-09 NOTE — PROGRESS NOTES
HISTORY OF PRESENT ILLNESS    Chief Complaint   Patient presents with    Anxiety    Gastroesophageal Reflux    Hypertension       Presents for follow-up    History of Present Illness  The patient is a 60-year-old male who presents with concerns about anxiety, hypertension, and reflux.    He has been experiencing lower back pain, which he initially attributed to aging. After being informed of decreased kidney function, he began researching and implementing herbal treatments for kidney and liver health. These treatments included a tea made from dates, ginseng, wolfberry, and orange peels. He noticed an improvement in his back pain and a change in his urine color from dark yellow and foamy to clear. He also reports that his urine no longer has an unpleasant odor. He drank the tea every day for a month and then stopped it. He has been drinking more water. He does not have any kidney stones. He used honey to amy the tea but noticed weight gain. He has gained approximately 10 pounds, which he attributes to the honey in the tea. He is considering trying Ozempic or another medication to help reduce body fat. He has been making efforts to lose weight, including eating healthier foods such as broth, bananas, strawberries, blueberries, and blackberries. He also started drinking coffee at work and using a sugar-free flavoring. He had planned to start walking for exercise this summer but was unable to due to his back pain. He is considering subscribing to a yoga program designed for people over 50. He does not believe his weight gain is related to a thyroid problem.     He has been using testosterone and is requesting a refill.    His blood pressure today is 130 systolic, which he believes is due to stress from traffic. His last recorded blood pressure was in the 120s during a recent visit to [facility]. He has discontinued hydrochlorothiazide and is requesting a refill of his medications.    He has been under significant

## 2025-07-09 NOTE — PROGRESS NOTES
Identified pt with two pt identifiers(name and ). Reviewed record in preparation for visit and have obtained necessary documentation. All patient medications has been reviewed.  Chief Complaint   Patient presents with    Anxiety    Gastroesophageal Reflux    Hypertension       Health Maintenance Due   Topic    DTaP/Tdap/Td vaccine (1 - Tdap)    Shingles vaccine (1 of 2)    Pneumococcal 50+ years Vaccine (1 of 1 - PCV)    Hepatitis B vaccine (1 of 3 - Risk 3-dose series)    COVID-19 Vaccine ( season)     Health Maintenance Review: Patient reminded of \"due or due soon\" health maintenance. I have asked the patient to contact his/her primary care provider (PCP) for follow-up on his/her health maintenance.    Wt Readings from Last 3 Encounters:   25 88 kg (194 lb)   25 85.5 kg (188 lb 6.4 oz)   24 84.6 kg (186 lb 6.4 oz)     Temp Readings from Last 3 Encounters:   25 98.3 °F (36.8 °C)   25 98.9 °F (37.2 °C)   24 98.7 °F (37.1 °C) (Oral)     BP Readings from Last 3 Encounters:   25 132/75   25 111/69   24 100/66     Pulse Readings from Last 3 Encounters:   25 63   25 68   24 71       1. \"Have you been to the ER, urgent care clinic since your last visit?  Hospitalized since your last visit?\" No    2. \"Have you seen or consulted any other health care providers outside of the [facility] since your last visit?\" [facility]     3. For patients aged 45-75: Has the patient had a colonoscopy / FIT/ Cologuard? Yes - Care Gap present. Most recent result on file    Patient is accompanied by partner I have received verbal consent from Neville Boyer to discuss any/all medical information while they are present in the room.

## 2025-07-16 LAB
BUN SERPL-MCNC: 10 MG/DL (ref 8–27)
BUN/CREAT SERPL: 9 (ref 10–24)
CALCIUM SERPL-MCNC: 9.9 MG/DL (ref 8.6–10.2)
CHLORIDE SERPL-SCNC: 100 MMOL/L (ref 96–106)
CO2 SERPL-SCNC: 21 MMOL/L (ref 20–29)
CREAT SERPL-MCNC: 1.1 MG/DL (ref 0.76–1.27)
EGFRCR SERPLBLD CKD-EPI 2021: 76 ML/MIN/1.73
ERYTHROCYTE [DISTWIDTH] IN BLOOD BY AUTOMATED COUNT: 13.5 % (ref 11.6–15.4)
GLUCOSE SERPL-MCNC: 75 MG/DL (ref 70–99)
HCT VFR BLD AUTO: 52.2 % (ref 37.5–51)
HGB BLD-MCNC: 17.3 G/DL (ref 13–17.7)
HIV 1+2 AB+HIV1 P24 AG SERPL QL IA: NON REACTIVE
MCH RBC QN AUTO: 33.5 PG (ref 26.6–33)
MCHC RBC AUTO-ENTMCNC: 33.1 G/DL (ref 31.5–35.7)
MCV RBC AUTO: 101 FL (ref 79–97)
PLATELET # BLD AUTO: 188 X10E3/UL (ref 150–450)
POTASSIUM SERPL-SCNC: 4.8 MMOL/L (ref 3.5–5.2)
PSA SERPL-MCNC: 0.7 NG/ML (ref 0–4)
RBC # BLD AUTO: 5.16 X10E6/UL (ref 4.14–5.8)
SODIUM SERPL-SCNC: 140 MMOL/L (ref 134–144)
TSH SERPL DL<=0.005 MIU/L-ACNC: 1.03 UIU/ML (ref 0.45–4.5)
WBC # BLD AUTO: 6.7 X10E3/UL (ref 3.4–10.8)

## 2025-07-17 LAB
ALBUMIN/CREAT UR: 16 MG/G CREAT (ref 0–29)
CREAT UR-MCNC: 58.1 MG/DL
MICROALBUMIN UR-MCNC: 9.1 UG/ML

## 2025-07-18 LAB
EST. AVERAGE GLUCOSE BLD GHB EST-MCNC: 117 MG/DL
HBA1C MFR BLD: 5.7 %HB

## (undated) DEVICE — CHEST PACK: Brand: MEDLINE INDUSTRIES, INC.

## (undated) DEVICE — DEVICE TRNSF SPIK STL 2008S] MICROTEK MEDICAL INC]

## (undated) DEVICE — INSULATED BLADE ELECTRODE: Brand: EDGE

## (undated) DEVICE — SOL IRRIGATION INJ NACL 0.9% 500ML BTL

## (undated) DEVICE — INFECTION CONTROL KIT SYS

## (undated) DEVICE — DEVON™ KNEE AND BODY STRAP 60" X 3" (1.5 M X 7.6 CM): Brand: DEVON

## (undated) DEVICE — NEEDLE HYPO 22GA L1.5IN BLK S STL HUB POLYPR SHLD REG BVL

## (undated) DEVICE — STERILE POLYISOPRENE POWDER-FREE SURGICAL GLOVES: Brand: PROTEXIS

## (undated) DEVICE — ROCKER SWITCH PENCIL BLADE ELECTRODE, HOLSTER: Brand: EDGE

## (undated) DEVICE — SUTURE MCRYL SZ 4-0 L27IN ABSRB UD L19MM PS-2 1/2 CIR PRIM Y426H

## (undated) DEVICE — DERMABOND SKIN ADH 0.7ML -- DERMABOND ADVANCED 12/BX

## (undated) DEVICE — LIGHT HANDLE: Brand: DEVON

## (undated) DEVICE — SUTURE VCRL SZ 3-0 L27IN ABSRB UD L26MM SH 1/2 CIR J416H